# Patient Record
Sex: FEMALE | Race: WHITE | HISPANIC OR LATINO | Employment: UNEMPLOYED | URBAN - METROPOLITAN AREA
[De-identification: names, ages, dates, MRNs, and addresses within clinical notes are randomized per-mention and may not be internally consistent; named-entity substitution may affect disease eponyms.]

---

## 2017-03-29 ENCOUNTER — HOSPITAL ENCOUNTER (EMERGENCY)
Facility: HOSPITAL | Age: 22
Discharge: HOME | End: 2017-03-29
Admitting: EMERGENCY MEDICINE

## 2017-03-29 ENCOUNTER — APPOINTMENT (EMERGENCY)
Dept: RADIOLOGY | Facility: HOSPITAL | Age: 22
End: 2017-03-29

## 2017-03-29 VITALS
OXYGEN SATURATION: 99 % | HEART RATE: 77 BPM | DIASTOLIC BLOOD PRESSURE: 53 MMHG | SYSTOLIC BLOOD PRESSURE: 110 MMHG | RESPIRATION RATE: 16 BRPM | TEMPERATURE: 98.5 F | WEIGHT: 136 LBS

## 2017-03-29 DIAGNOSIS — S50.12XA CONTUSION OF LEFT FOREARM, INITIAL ENCOUNTER: ICD-10-CM

## 2017-03-29 DIAGNOSIS — S80.01XA CONTUSION OF RIGHT KNEE, INITIAL ENCOUNTER: ICD-10-CM

## 2017-03-29 DIAGNOSIS — S39.012A LOW BACK STRAIN, INITIAL ENCOUNTER: ICD-10-CM
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
## 2017-03-29 DIAGNOSIS — S80.02XA CONTUSION OF LEFT KNEE, INITIAL ENCOUNTER: ICD-10-CM

## 2017-03-29 PROCEDURE — 73564 X-RAY EXAM KNEE 4 OR MORE: CPT

## 2017-03-29 PROCEDURE — 99284 EMERGENCY DEPT VISIT MOD MDM: CPT

## 2017-03-29 PROCEDURE — 72100 X-RAY EXAM L-S SPINE 2/3 VWS: CPT

## 2017-03-29 PROCEDURE — 73090 X-RAY EXAM OF FOREARM: CPT

## 2017-03-29 RX ORDER — NAPROXEN 500 MG/1
500 TABLET ORAL 2 TIMES DAILY WITH MEALS
Qty: 30 TABLET | Refills: 0 | Status: SHIPPED | OUTPATIENT
Start: 2017-03-29

## 2017-04-06 ENCOUNTER — ALLSCRIPTS OFFICE VISIT (OUTPATIENT)
Dept: OTHER | Facility: OTHER | Age: 22
End: 2017-04-06

## 2017-04-07 ENCOUNTER — GENERIC CONVERSION - ENCOUNTER (OUTPATIENT)
Dept: OTHER | Facility: OTHER | Age: 22
End: 2017-04-07

## 2018-01-10 NOTE — MISCELLANEOUS
Provider Comments  Provider Comments:   Patient is a no-show for her 56 new patient appointment      Signatures   Electronically signed by : MARISSA Walker;  Apr 6 2017  9:44AM EST                       (Author)

## 2021-06-11 ENCOUNTER — HOSPITAL ENCOUNTER (EMERGENCY)
Facility: HOSPITAL | Age: 26
Discharge: HOME | End: 2021-06-11
Attending: EMERGENCY MEDICINE
Payer: COMMERCIAL

## 2021-06-11 VITALS
DIASTOLIC BLOOD PRESSURE: 73 MMHG | HEART RATE: 94 BPM | WEIGHT: 173.72 LBS | SYSTOLIC BLOOD PRESSURE: 127 MMHG | TEMPERATURE: 98.5 F | OXYGEN SATURATION: 100 % | RESPIRATION RATE: 18 BRPM

## 2021-06-11 DIAGNOSIS — M54.9 BACK PAIN: Primary | ICD-10-CM

## 2021-06-11 LAB
ALBUMIN SERPL BCP-MCNC: 4.4 G/DL (ref 3.5–5)
ALP SERPL-CCNC: 106 U/L (ref 46–116)
ALT SERPL W P-5'-P-CCNC: 46 U/L (ref 12–78)
ANION GAP SERPL CALCULATED.3IONS-SCNC: 9 MMOL/L (ref 4–13)
AST SERPL W P-5'-P-CCNC: 23 U/L (ref 5–45)
ATRIAL RATE: 102 BPM
BACTERIA UR QL AUTO: ABNORMAL /HPF
BASOPHILS # BLD AUTO: 0.03 THOUSANDS/ΜL (ref 0–0.1)
BASOPHILS NFR BLD AUTO: 0 % (ref 0–1)
BILIRUB DIRECT SERPL-MCNC: 0.1 MG/DL (ref 0–0.2)
BILIRUB SERPL-MCNC: 0.3 MG/DL (ref 0.2–1)
BILIRUB UR QL STRIP: NEGATIVE
BUN SERPL-MCNC: 12 MG/DL (ref 5–25)
CALCIUM SERPL-MCNC: 9.3 MG/DL (ref 8.3–10.1)
CHLORIDE SERPL-SCNC: 104 MMOL/L (ref 100–108)
CK SERPL-CCNC: 97 U/L (ref 26–192)
CLARITY UR: CLEAR
CO2 SERPL-SCNC: 26 MMOL/L (ref 21–32)
COLOR UR: YELLOW
CREAT SERPL-MCNC: 0.67 MG/DL (ref 0.6–1.3)
CRP SERPL HS-MCNC: 4.76 MG/L
EOSINOPHIL # BLD AUTO: 0.07 THOUSAND/ΜL (ref 0–0.61)
EOSINOPHIL NFR BLD AUTO: 1 % (ref 0–6)
ERYTHROCYTE [DISTWIDTH] IN BLOOD BY AUTOMATED COUNT: 12.6 % (ref 11.6–15.1)
EXT PREG TEST URINE: NEGATIVE
EXT. CONTROL ED NAV: NORMAL
GFR SERPL CREATININE-BSD FRML MDRD: 122 ML/MIN/1.73SQ M
GLUCOSE SERPL-MCNC: 103 MG/DL (ref 65–140)
GLUCOSE UR STRIP-MCNC: NEGATIVE MG/DL
HCT VFR BLD AUTO: 41.2 % (ref 34.8–46.1)
HGB BLD-MCNC: 13.7 G/DL (ref 11.5–15.4)
HGB UR QL STRIP.AUTO: NEGATIVE
IMM GRANULOCYTES # BLD AUTO: 0.03 THOUSAND/UL (ref 0–0.2)
IMM GRANULOCYTES NFR BLD AUTO: 0 % (ref 0–2)
KETONES UR STRIP-MCNC: NEGATIVE MG/DL
LEUKOCYTE ESTERASE UR QL STRIP: ABNORMAL
LYMPHOCYTES # BLD AUTO: 2.83 THOUSANDS/ΜL (ref 0.6–4.47)
LYMPHOCYTES NFR BLD AUTO: 26 % (ref 14–44)
MAGNESIUM SERPL-MCNC: 2.1 MG/DL (ref 1.6–2.6)
MCH RBC QN AUTO: 31.1 PG (ref 26.8–34.3)
MCHC RBC AUTO-ENTMCNC: 33.3 G/DL (ref 31.4–37.4)
MCV RBC AUTO: 93 FL (ref 82–98)
MONOCYTES # BLD AUTO: 0.51 THOUSAND/ΜL (ref 0.17–1.22)
MONOCYTES NFR BLD AUTO: 5 % (ref 4–12)
NEUTROPHILS # BLD AUTO: 7.49 THOUSANDS/ΜL (ref 1.85–7.62)
NEUTS SEG NFR BLD AUTO: 68 % (ref 43–75)
NITRITE UR QL STRIP: NEGATIVE
NON-SQ EPI CELLS URNS QL MICRO: ABNORMAL /HPF
NRBC BLD AUTO-RTO: 0 /100 WBCS
P AXIS: 63 DEGREES
PH UR STRIP.AUTO: 6 [PH] (ref 4.5–8)
PLATELET # BLD AUTO: 285 THOUSANDS/UL (ref 149–390)
PMV BLD AUTO: 11.1 FL (ref 8.9–12.7)
POTASSIUM SERPL-SCNC: 3.3 MMOL/L (ref 3.5–5.3)
PR INTERVAL: 116 MS
PROT SERPL-MCNC: 8.7 G/DL (ref 6.4–8.2)
PROT UR STRIP-MCNC: NEGATIVE MG/DL
QRS AXIS: 76 DEGREES
QRSD INTERVAL: 82 MS
QT INTERVAL: 352 MS
QTC INTERVAL: 458 MS
RBC # BLD AUTO: 4.41 MILLION/UL (ref 3.81–5.12)
RBC #/AREA URNS AUTO: ABNORMAL /HPF
SODIUM SERPL-SCNC: 139 MMOL/L (ref 136–145)
SP GR UR STRIP.AUTO: 1.02 (ref 1–1.03)
T WAVE AXIS: 49 DEGREES
TROPONIN I SERPL-MCNC: <0.02 NG/ML
UROBILINOGEN UR QL STRIP.AUTO: 1 E.U./DL
VENTRICULAR RATE: 102 BPM
WBC # BLD AUTO: 10.96 THOUSAND/UL (ref 4.31–10.16)
WBC #/AREA URNS AUTO: ABNORMAL /HPF

## 2021-06-11 PROCEDURE — 93010 ELECTROCARDIOGRAM REPORT: CPT | Performed by: INTERNAL MEDICINE

## 2021-06-11 PROCEDURE — 93005 ELECTROCARDIOGRAM TRACING: CPT

## 2021-06-11 PROCEDURE — 80048 BASIC METABOLIC PNL TOTAL CA: CPT | Performed by: EMERGENCY MEDICINE

## 2021-06-11 PROCEDURE — 82550 ASSAY OF CK (CPK): CPT | Performed by: EMERGENCY MEDICINE

## 2021-06-11 PROCEDURE — 83735 ASSAY OF MAGNESIUM: CPT | Performed by: EMERGENCY MEDICINE

## 2021-06-11 PROCEDURE — 80076 HEPATIC FUNCTION PANEL: CPT | Performed by: EMERGENCY MEDICINE

## 2021-06-11 PROCEDURE — 84484 ASSAY OF TROPONIN QUANT: CPT | Performed by: EMERGENCY MEDICINE

## 2021-06-11 PROCEDURE — 85025 COMPLETE CBC W/AUTO DIFF WBC: CPT | Performed by: EMERGENCY MEDICINE

## 2021-06-11 PROCEDURE — 96374 THER/PROPH/DIAG INJ IV PUSH: CPT

## 2021-06-11 PROCEDURE — 81025 URINE PREGNANCY TEST: CPT | Performed by: EMERGENCY MEDICINE

## 2021-06-11 PROCEDURE — 96361 HYDRATE IV INFUSION ADD-ON: CPT

## 2021-06-11 PROCEDURE — 99285 EMERGENCY DEPT VISIT HI MDM: CPT | Performed by: EMERGENCY MEDICINE

## 2021-06-11 PROCEDURE — 81001 URINALYSIS AUTO W/SCOPE: CPT

## 2021-06-11 PROCEDURE — 86141 C-REACTIVE PROTEIN HS: CPT | Performed by: EMERGENCY MEDICINE

## 2021-06-11 PROCEDURE — 36415 COLL VENOUS BLD VENIPUNCTURE: CPT | Performed by: EMERGENCY MEDICINE

## 2021-06-11 PROCEDURE — 99283 EMERGENCY DEPT VISIT LOW MDM: CPT

## 2021-06-11 RX ORDER — NAPROXEN 500 MG/1
500 TABLET ORAL 2 TIMES DAILY WITH MEALS
Qty: 20 TABLET | Refills: 0 | Status: SHIPPED | OUTPATIENT
Start: 2021-06-11 | End: 2021-06-21

## 2021-06-11 RX ORDER — METHOCARBAMOL 500 MG/1
500 TABLET, FILM COATED ORAL 2 TIMES DAILY
Qty: 20 TABLET | Refills: 0 | Status: SHIPPED | OUTPATIENT
Start: 2021-06-11

## 2021-06-11 RX ORDER — KETOROLAC TROMETHAMINE 30 MG/ML
15 INJECTION, SOLUTION INTRAMUSCULAR; INTRAVENOUS ONCE
Status: COMPLETED | OUTPATIENT
Start: 2021-06-11 | End: 2021-06-11

## 2021-06-11 RX ADMIN — KETOROLAC TROMETHAMINE 15 MG: 30 INJECTION, SOLUTION INTRAMUSCULAR; INTRAVENOUS at 20:56

## 2021-06-11 RX ADMIN — SODIUM CHLORIDE 1000 ML: 0.9 INJECTION, SOLUTION INTRAVENOUS at 20:54

## 2021-06-12 NOTE — DISCHARGE INSTRUCTIONS
Take the naprosyn twice daily for the next 10 days  Use the Robaxin as needed for muscle spasm  Use an electric heating pad over your sore muscles, 4-5 times daily, 20 minutes each time  Make sure to drink plenty of fluids  The number for the [facility] has been included in these discharge instructions, you should call to establish continuing medical care if they can make a sooner appointment than your current scheduled appointment with [facility]

## 2021-06-12 NOTE — ED PROVIDER NOTES
History  Chief Complaint   Patient presents with    Back Pain     Pt reports pain in upper and lower back, chest, bilateral flank pain that has been intermittent for the last 2 weeks  Also c/o right sided eye pain  33 YO female presents with back pain  Pt states she has had pain intermittently, usually infrequently, over the course of years, it has become more frequent over the last 2-3 weeks  She states it now will occur daily, does seem to improve with acetaminophen but returns  The pain is in the B/L thoracic back and radiates down to the lower back  She additionally has pain radiating into the chest, primarily the B/L lower chest   She denies fevers, no weakness or numbness  Pt does note some headache today with pain behind the Right eye  She denies any new or different symptoms  She has been seen in the ED for this in the past, states she was given muscle relaxers which did seem to help but she has run out of these  She has an appointment with a family doctor to discuss this but it is not for months  History provided by:  Patient   used: No    Back Pain  Location:  Generalized  Quality:  Aching and shooting  Radiates to: Chest   Pain severity:  Moderate  Pain is:  Unable to specify  Onset quality:  Gradual  Duration: Years  Timing:  Intermittent  Progression:  Worsening  Chronicity:  New  Relieved by:  Muscle relaxants  Worsened by:  Nothing  Ineffective treatments:  None tried  Associated symptoms: no abdominal pain, no chest pain, no dysuria, no fever and no weakness        Prior to Admission Medications   Prescriptions Last Dose Informant Patient Reported? Taking?   naproxen (NAPROSYN) 500 mg tablet   No No   Sig: Take 1 tablet by mouth 2 (two) times a day with meals      Facility-Administered Medications: None       Past Medical History:   Diagnosis Date    No known health problems        History reviewed  No pertinent surgical history  History reviewed   No pertinent family history  I have reviewed and agree with the history as documented  E-Cigarette/Vaping     E-Cigarette/Vaping Substances     Social History     Tobacco Use    Smoking status: Never Smoker    Smokeless tobacco: Never Used   Substance Use Topics    Alcohol use: No    Drug use: No       Review of Systems   Constitutional: Negative for chills, fatigue and fever  HENT: Negative for dental problem  Eyes: Negative for visual disturbance  Respiratory: Negative for shortness of breath  Cardiovascular: Negative for chest pain  Gastrointestinal: Negative for abdominal pain, diarrhea and vomiting  Genitourinary: Negative for dysuria and frequency  Musculoskeletal: Positive for back pain  Negative for arthralgias  Skin: Negative for rash  Neurological: Negative for dizziness, weakness and light-headedness  Psychiatric/Behavioral: Negative for agitation, behavioral problems and confusion  All other systems reviewed and are negative  Physical Exam  Physical Exam  Vitals signs and nursing note reviewed  Constitutional:       Appearance: Normal appearance  HENT:      Head: Normocephalic and atraumatic  Eyes:      Extraocular Movements: Extraocular movements intact  Conjunctiva/sclera: Conjunctivae normal    Neck:      Musculoskeletal: Normal range of motion  Cardiovascular:      Rate and Rhythm: Normal rate  Pulmonary:      Effort: Pulmonary effort is normal    Abdominal:      General: There is no distension  Musculoskeletal: Normal range of motion  Comments: Tender over back, no rash, bruising, swelling  Skin:     Findings: No rash  Neurological:      General: No focal deficit present  Mental Status: She is alert  Cranial Nerves: No cranial nerve deficit     Psychiatric:         Mood and Affect: Mood normal          Vital Signs  ED Triage Vitals   Temperature Pulse Respirations Blood Pressure SpO2   06/11/21 1946 06/11/21 1946 06/11/21 1946 06/11/21 1946 06/11/21 1946   98 5 °F (36 9 °C) 94 18 127/73 100 %      Temp Source Heart Rate Source Patient Position - Orthostatic VS BP Location FiO2 (%)   06/11/21 1946 06/11/21 1946 06/11/21 1946 06/11/21 1946 --   Oral Monitor Sitting Right arm       Pain Score       06/11/21 2056       8           Vitals:    06/11/21 1946   BP: 127/73   Pulse: 94   Patient Position - Orthostatic VS: Sitting         Visual Acuity      ED Medications  Medications   sodium chloride 0 9 % bolus 1,000 mL (0 mL Intravenous Stopped 6/11/21 2201)   ketorolac (TORADOL) injection 15 mg (15 mg Intravenous Given 6/11/21 2056)       Diagnostic Studies  Results Reviewed     Procedure Component Value Units Date/Time    Hepatic function panel [[id number]]  (Abnormal) Collected: 06/11/21 2053    Lab Status: Final result Specimen: Blood from Arm, Left Updated: 06/11/21 2137     Total Bilirubin 0 30 mg/dL      Bilirubin, Direct 0 10 mg/dL      Alkaline Phosphatase 106 U/L      AST 23 U/L      ALT 46 U/L      Total Protein 8 7 g/dL      Albumin 4 4 g/dL     Magnesium [[id number]]  (Normal) Collected: 06/11/21 2053    Lab Status: Final result Specimen: Blood from Arm, Left Updated: 06/11/21 2137     Magnesium 2 1 mg/dL     CK Total with Reflex CKMB [[id number]]  (Normal) Collected: 06/11/21 2053    Lab Status: Final result Specimen: Blood from Arm, Left Updated: 06/11/21 2137     Total CK 97 U/L     High sensitivity CRP [[id number]] Collected: 06/11/21 2053    Lab Status: Final result Specimen: Blood from Arm, Left Updated: 06/11/21 2137     CRP, High Sensitivity 4 76 mg/L     Narrative:            HsCRP Level       Relative Risk           <1 0 mg/L          Low           1 0 to 3 0 mg/L    Average           >3 0 mg/L          High        Troponin I [[id number]]  (Normal) Collected: 06/11/21 2053    Lab Status: Final result Specimen: Blood from Arm, Left Updated: 06/11/21 2126     Troponin I <0 02 ng/mL     Basic metabolic panel [[id number]]  (Abnormal) Collected: 06/11/21 2053    Lab Status: Final result Specimen: Blood from Arm, Left Updated: 06/11/21 2125     Sodium 139 mmol/L      Potassium 3 3 mmol/L      Chloride 104 mmol/L      CO2 26 mmol/L      ANION GAP 9 mmol/L      BUN 12 mg/dL      Creatinine 0 67 mg/dL      Glucose 103 mg/dL      Calcium 9 3 mg/dL      eGFR 122 ml/min/1 73sq m     Narrative:      [place] guidelines for Chronic Kidney Disease (CKD):     Stage 1 with normal or high GFR (GFR > 90 mL/min/1 73 square meters)    Stage 2 Mild CKD (GFR = 60-89 mL/min/1 73 square meters)    Stage 3A Moderate CKD (GFR = 45-59 mL/min/1 73 square meters)    Stage 3B Moderate CKD (GFR = 30-44 mL/min/1 73 square meters)    Stage 4 Severe CKD (GFR = 15-29 mL/min/1 73 square meters)    Stage 5 End Stage CKD (GFR <15 mL/min/1 73 square meters)  Note: GFR calculation is accurate only with a steady state creatinine    Urine Microscopic [[id number]]  (Abnormal) Collected: 06/11/21 2053    Lab Status: Final result Specimen: Urine, Clean Catch Updated: 06/11/21 2123     RBC, UA None Seen /hpf      WBC, UA 2-4 /hpf      Epithelial Cells Moderate /hpf      Bacteria, UA Occasional /hpf     CBC and differential [[id number]]  (Abnormal) Collected: 06/11/21 2053    Lab Status: Final result Specimen: Blood from Arm, Left Updated: 06/11/21 2109     WBC 10 96 Thousand/uL      RBC 4 41 Million/uL      Hemoglobin 13 7 g/dL      Hematocrit 41 2 %      MCV 93 fL      MCH 31 1 pg      MCHC 33 3 g/dL      RDW 12 6 %      MPV 11 1 fL      Platelets 905 Thousands/uL      nRBC 0 /100 WBCs      Neutrophils Relative 68 %      Immat GRANS % 0 %      Lymphocytes Relative 26 %      Monocytes Relative 5 %      Eosinophils Relative 1 %      Basophils Relative 0 %      Neutrophils Absolute 7 49 Thousands/µL      Immature Grans Absolute 0 03 Thousand/uL      Lymphocytes Absolute 2 83 Thousands/µL      Monocytes Absolute 0 51 Thousand/µL      Eosinophils Absolute 0 07 Thousand/µL Basophils Absolute 0 03 Thousands/µL     POCT pregnancy, urine [[id number]]  (Normal) Resulted: 06/11/21 2056    Lab Status: Final result Updated: 06/11/21 2056     EXT PREG TEST UR (Ref: Negative) Negative     Control Valid    Urine Macroscopic, POC [[id number]]  (Abnormal) Collected: 06/11/21 2053    Lab Status: Final result Specimen: Urine Updated: 06/11/21 2054     Color, UA Yellow     Clarity, UA Clear     pH, UA 6 0     Leukocytes, UA Trace     Nitrite, UA Negative     Protein, UA Negative mg/dl      Glucose, UA Negative mg/dl      Ketones, UA Negative mg/dl      Urobilinogen, UA 1 0 E U /dl      Bilirubin, UA Negative     Blood, UA Negative     Specific Gravity, UA 1 020    Narrative:      CLINITEK RESULT                 No orders to display              Procedures  ECG 12 Lead Documentation Only    Date/Time: 6/11/2021 11:12 PM  Performed by: Nolberto Hernandez MD  Authorized by: Nolberto Hernandez MD     ECG reviewed by me, the ED Provider: yes    Patient location:  ED  Interpretation:     Interpretation: normal    Rate:     ECG rate:  102    ECG rate assessment: tachycardic    Rhythm:     Rhythm: sinus rhythm and sinus tachycardia    QRS:     QRS axis:  Normal    QRS intervals:  Normal  Conduction:     Conduction: normal    ST segments:     ST segments:  Normal  T waves:     T waves: normal               ED Course  ED Course as of Jun 11 2319 Fri Jun 11, 2021 2150 Pt states she has significant improvement in her pain  MDM  Number of Diagnoses or Management Options  Back pain: new and requires workup  Diagnosis management comments: 1  Back pain - Pt with ongoing, intermittent back pain, radiating to the chest  She has no known cardiac issues, no "red flag" symptoms  Will check ECG and troponin, urine for infection and pregnancy, CBC, metabolic panel for electrolyte abnormalities and dehydration, will check CK and CRP for possibly myositis  Will give fluids, NSAIDs  Amount and/or Complexity of Data Reviewed  Clinical lab tests: ordered and reviewed  Tests in the radiology section of CPT®: ordered and reviewed  Independent visualization of images, tracings, or specimens: yes    Patient Progress  Patient progress: improved      Disposition  Final diagnoses:   Back pain     Time reflects when diagnosis was documented in both MDM as applicable and the Disposition within this note     Time User Action Codes Description Comment    6/11/2021  9:50 PM Ezio Betty Jernigan [M54 9] Back pain       ED Disposition     ED Disposition Condition Date/Time Comment    Discharge Stable Fri Jun 11, 2021  9:50 PM Nicki Mendenhall discharge to home/self care  Follow-up Information     Follow up With Specialties Details Why [address] [facility] 2nd Floor Family Medicine   [address] [facility]  [phone]            Discharge Medication List as of 6/11/2021  9:52 PM      START taking these medications    Details   methocarbamol (ROBAXIN) 500 mg tablet Take 1 tablet (500 mg total) by mouth 2 (two) times a day, Starting Fri 6/11/2021, Normal      !! naproxen (NAPROSYN) 500 mg tablet Take 1 tablet (500 mg total) by mouth 2 (two) times a day with meals for 10 days, Starting Fri 6/11/2021, Until Mon 6/21/2021, Normal       !! - Potential duplicate medications found  Please discuss with provider  CONTINUE these medications which have NOT CHANGED    Details   !! naproxen (NAPROSYN) 500 mg tablet Take 1 tablet by mouth 2 (two) times a day with meals, Starting 3/29/2017, Until Discontinued, Print       !! - Potential duplicate medications found  Please discuss with provider  No discharge procedures on file      PDMP Review     None          ED Provider  Electronically Signed by           Maria Salcedo MD  06/11/21 2752

## 2023-03-19 ENCOUNTER — APPOINTMENT (EMERGENCY)
Dept: RADIOLOGY | Facility: HOSPITAL | Age: 28
End: 2023-03-19

## 2023-03-19 ENCOUNTER — HOSPITAL ENCOUNTER (EMERGENCY)
Facility: HOSPITAL | Age: 28
Discharge: HOME | End: 2023-03-19
Attending: EMERGENCY MEDICINE | Admitting: EMERGENCY MEDICINE

## 2023-03-19 VITALS
DIASTOLIC BLOOD PRESSURE: 67 MMHG | TEMPERATURE: 98.2 F | HEART RATE: 76 BPM | RESPIRATION RATE: 16 BRPM | OXYGEN SATURATION: 100 % | SYSTOLIC BLOOD PRESSURE: 126 MMHG

## 2023-03-19 DIAGNOSIS — M54.9 BACK PAIN: Primary | ICD-10-CM

## 2023-03-19 DIAGNOSIS — R07.9 CHEST PAIN, UNSPECIFIED: ICD-10-CM

## 2023-03-19 LAB
ALBUMIN SERPL BCP-MCNC: 4.6 G/DL (ref 3.5–5)
ALP SERPL-CCNC: 87 U/L (ref 34–104)
ALT SERPL W P-5'-P-CCNC: 22 U/L (ref 7–52)
ANION GAP SERPL CALCULATED.3IONS-SCNC: 9 MMOL/L (ref 4–13)
AST SERPL W P-5'-P-CCNC: 17 U/L (ref 13–39)
ATRIAL RATE: 79 BPM
BASOPHILS # BLD AUTO: 0.02 THOUSANDS/ÂΜL (ref 0–0.1)
BASOPHILS NFR BLD AUTO: 0 % (ref 0–1)
BILIRUB SERPL-MCNC: 0.44 MG/DL (ref 0.2–1)
BUN SERPL-MCNC: 12 MG/DL (ref 5–25)
CALCIUM SERPL-MCNC: 10 MG/DL (ref 8.4–10.2)
CARDIAC TROPONIN I PNL SERPL HS: <2 NG/L
CHLORIDE SERPL-SCNC: 107 MMOL/L (ref 96–108)
CO2 SERPL-SCNC: 24 MMOL/L (ref 21–32)
CREAT SERPL-MCNC: 0.7 MG/DL (ref 0.6–1.3)
EOSINOPHIL # BLD AUTO: 0.05 THOUSAND/ÂΜL (ref 0–0.61)
EOSINOPHIL NFR BLD AUTO: 1 % (ref 0–6)
ERYTHROCYTE [DISTWIDTH] IN BLOOD BY AUTOMATED COUNT: 13.5 % (ref 11.6–15.1)
GFR SERPL CREATININE-BSD FRML MDRD: 119 ML/MIN/1.73SQ M
GLUCOSE SERPL-MCNC: 111 MG/DL (ref 65–140)
HCT VFR BLD AUTO: 39.3 % (ref 34.8–46.1)
HGB BLD-MCNC: 13.1 G/DL (ref 11.5–15.4)
IMM GRANULOCYTES # BLD AUTO: 0.01 THOUSAND/UL (ref 0–0.2)
IMM GRANULOCYTES NFR BLD AUTO: 0 % (ref 0–2)
LIPASE SERPL-CCNC: 20 U/L (ref 11–82)
LYMPHOCYTES # BLD AUTO: 2.02 THOUSANDS/ÂΜL (ref 0.6–4.47)
LYMPHOCYTES NFR BLD AUTO: 31 % (ref 14–44)
MCH RBC QN AUTO: 30.4 PG (ref 26.8–34.3)
MCHC RBC AUTO-ENTMCNC: 33.3 G/DL (ref 31.4–37.4)
MCV RBC AUTO: 91 FL (ref 82–98)
MONOCYTES # BLD AUTO: 0.26 THOUSAND/ÂΜL (ref 0.17–1.22)
MONOCYTES NFR BLD AUTO: 4 % (ref 4–12)
NEUTROPHILS # BLD AUTO: 4.24 THOUSANDS/ÂΜL (ref 1.85–7.62)
NEUTS SEG NFR BLD AUTO: 64 % (ref 43–75)
NRBC BLD AUTO-RTO: 0 /100 WBCS
P AXIS: 67 DEGREES
PLATELET # BLD AUTO: 223 THOUSANDS/UL (ref 149–390)
PMV BLD AUTO: 11.4 FL (ref 8.9–12.7)
POTASSIUM SERPL-SCNC: 3.6 MMOL/L (ref 3.5–5.3)
PR INTERVAL: 122 MS
PROT SERPL-MCNC: 7.6 G/DL (ref 6.4–8.4)
QRS AXIS: 86 DEGREES
QRSD INTERVAL: 92 MS
QT INTERVAL: 374 MS
QTC INTERVAL: 428 MS
RBC # BLD AUTO: 4.31 MILLION/UL (ref 3.81–5.12)
SODIUM SERPL-SCNC: 140 MMOL/L (ref 135–147)
T WAVE AXIS: 27 DEGREES
VENTRICULAR RATE: 79 BPM
WBC # BLD AUTO: 6.6 THOUSAND/UL (ref 4.31–10.16)

## 2023-03-19 RX ORDER — KETOROLAC TROMETHAMINE 30 MG/ML
15 INJECTION, SOLUTION INTRAMUSCULAR; INTRAVENOUS ONCE
Status: COMPLETED | OUTPATIENT
Start: 2023-03-19 | End: 2023-03-19

## 2023-03-19 RX ORDER — ACETAMINOPHEN 325 MG/1
650 TABLET ORAL ONCE
Status: COMPLETED | OUTPATIENT
Start: 2023-03-19 | End: 2023-03-19

## 2023-03-19 RX ORDER — LIDOCAINE 50 MG/G
2 PATCH TOPICAL ONCE
Status: DISCONTINUED | OUTPATIENT
Start: 2023-03-19 | End: 2023-03-19 | Stop reason: HOSPADM

## 2023-03-19 RX ADMIN — LIDOCAINE 5% 2 PATCH: 700 PATCH TOPICAL at 02:35

## 2023-03-19 RX ADMIN — ACETAMINOPHEN 325MG 650 MG: 325 TABLET ORAL at 02:25

## 2023-03-19 RX ADMIN — KETOROLAC TROMETHAMINE 15 MG: 30 INJECTION, SOLUTION INTRAMUSCULAR; INTRAVENOUS at 02:33

## 2023-03-19 NOTE — ED PROVIDER NOTES
"History  Chief Complaint   Patient presents with   • Chest Pain     Patient reports pain to chest and b/l upper back for 1 month  Denies cough, some SOB  States history of same previously, has been seen in ED and diagnosed with \"muscle spasms\"  Patient is 4 weeks postpartum      Beverly Jc is a 20-year-old female with PMH of recurrent back and chest pain, 4 weeks postpartum, presenting with thoracic back and chest pain x1 month  She states that she has had this pain for over 1 year, it has been worsening for the last month, describes it as a burning sensation, mild relief with Motrin which she has been taking every day  Pain is worse when sitting or lying down, not significantly worsened with movement or walking  Pain not worsened with eating  Denies abdominal pain, fever, N/V, diarrhea  Denies bladder/bowel incontinence, saddle anesthesia, paresthesias, significant weakness  Prior to Admission Medications   Prescriptions Last Dose Informant Patient Reported? Taking? methocarbamol (ROBAXIN) 500 mg tablet   No No   Sig: Take 1 tablet (500 mg total) by mouth 2 (two) times a day   naproxen (NAPROSYN) 500 mg tablet   No No   Sig: Take 1 tablet by mouth 2 (two) times a day with meals      Facility-Administered Medications: None       Past Medical History:   Diagnosis Date   • No known health problems        History reviewed  No pertinent surgical history  History reviewed  No pertinent family history  I have reviewed and agree with the history as documented  E-Cigarette/Vaping     E-Cigarette/Vaping Substances     Social History     Tobacco Use   • Smoking status: Never   • Smokeless tobacco: Never   Substance Use Topics   • Alcohol use: No   • Drug use: No       Review of Systems   Constitutional: Negative for chills and fever  HENT: Negative for ear pain and sore throat  Eyes: Negative for pain and visual disturbance  Respiratory: Negative for cough and shortness of breath      Cardiovascular: " Positive for chest pain  Negative for palpitations  Gastrointestinal: Negative for abdominal pain and vomiting  Genitourinary: Negative for dysuria and hematuria  Musculoskeletal: Positive for back pain  Negative for arthralgias  Skin: Negative for color change and rash  Neurological: Negative for seizures and syncope  All other systems reviewed and are negative  Physical Exam  Physical Exam  Vitals and nursing note reviewed  Constitutional:       General: She is not in acute distress  Appearance: She is well-developed  HENT:      Head: Normocephalic and atraumatic  Eyes:      Conjunctiva/sclera: Conjunctivae normal    Cardiovascular:      Rate and Rhythm: Normal rate and regular rhythm  Heart sounds: No murmur heard  Pulmonary:      Effort: Pulmonary effort is normal  No respiratory distress  Breath sounds: Normal breath sounds  Abdominal:      Palpations: Abdomen is soft  Tenderness: There is no abdominal tenderness  Musculoskeletal:         General: No swelling  Cervical back: Neck supple  Back:       Comments: Tender to palpation of upper back / scapular region  No tenderness to palpation of spine, low back, neck  Skin:     General: Skin is warm and dry  Capillary Refill: Capillary refill takes less than 2 seconds  Neurological:      Mental Status: She is alert     Psychiatric:         Mood and Affect: Mood normal          Vital Signs  ED Triage Vitals [03/19/23 0125]   Temperature Pulse Respirations Blood Pressure SpO2   98 2 °F (36 8 °C) 83 16 126/67 99 %      Temp Source Heart Rate Source Patient Position - Orthostatic VS BP Location FiO2 (%)   Oral Monitor Sitting Right arm --      Pain Score       8           Vitals:    03/19/23 0125 03/19/23 0145 03/19/23 0200 03/19/23 0300   BP: 126/67 119/63 136/69 126/67   Pulse: 83 70 71 76   Patient Position - Orthostatic VS: Sitting Lying Lying Lying         Visual Acuity      ED Medications  Medications   lidocaine (LIDODERM) 5 % patch 2 patch (2 patches Topical Medication Applied 3/19/23 0235)   ketorolac (TORADOL) injection 15 mg (15 mg Intravenous Given 3/19/23 0233)   acetaminophen (TYLENOL) tablet 650 mg (650 mg Oral Given 3/19/23 0225)       Diagnostic Studies  Results Reviewed     Procedure Component Value Units Date/Time    HS Troponin 0hr (reflex protocol) [[id number]]  (Normal) Collected: 03/19/23 0233    Lab Status: Final result Specimen: Blood from Arm, Left Updated: 03/19/23 0305     hs TnI 0hr <2 ng/L     Lipase [[id number]]  (Normal) Collected: 03/19/23 0233    Lab Status: Final result Specimen: Blood from Arm, Left Updated: 03/19/23 0257     Lipase 20 u/L     Comprehensive metabolic panel [[id number]] Collected: 03/19/23 0233    Lab Status: Final result Specimen: Blood from Arm, Left Updated: 03/19/23 0257     Sodium 140 mmol/L      Potassium 3 6 mmol/L      Chloride 107 mmol/L      CO2 24 mmol/L      ANION GAP 9 mmol/L      BUN 12 mg/dL      Creatinine 0 70 mg/dL      Glucose 111 mg/dL      Calcium 10 0 mg/dL      AST 17 U/L      ALT 22 U/L      Alkaline Phosphatase 87 U/L      Total Protein 7 6 g/dL      Albumin 4 6 g/dL      Total Bilirubin 0 44 mg/dL      eGFR 119 ml/min/1 73sq m     Narrative:      [place] guidelines for Chronic Kidney Disease (CKD):   •  Stage 1 with normal or high GFR (GFR > 90 mL/min/1 73 square meters)  •  Stage 2 Mild CKD (GFR = 60-89 mL/min/1 73 square meters)  •  Stage 3A Moderate CKD (GFR = 45-59 mL/min/1 73 square meters)  •  Stage 3B Moderate CKD (GFR = 30-44 mL/min/1 73 square meters)  •  Stage 4 Severe CKD (GFR = 15-29 mL/min/1 73 square meters)  •  Stage 5 End Stage CKD (GFR <15 mL/min/1 73 square meters)  Note: GFR calculation is accurate only with a steady state creatinine    CBC and differential [[id number]] Collected: 03/19/23 0233    Lab Status: Final result Specimen: Blood from Arm, Left Updated: 03/19/23 0247 WBC 6 60 Thousand/uL      RBC 4 31 Million/uL      Hemoglobin 13 1 g/dL      Hematocrit 39 3 %      MCV 91 fL      MCH 30 4 pg      MCHC 33 3 g/dL      RDW 13 5 %      MPV 11 4 fL      Platelets 233 Thousands/uL      nRBC 0 /100 WBCs      Neutrophils Relative 64 %      Immat GRANS % 0 %      Lymphocytes Relative 31 %      Monocytes Relative 4 %      Eosinophils Relative 1 %      Basophils Relative 0 %      Neutrophils Absolute 4 24 Thousands/µL      Immature Grans Absolute 0 01 Thousand/uL      Lymphocytes Absolute 2 02 Thousands/µL      Monocytes Absolute 0 26 Thousand/µL      Eosinophils Absolute 0 05 Thousand/µL      Basophils Absolute 0 02 Thousands/µL                  XR chest 2 views   ED Interpretation by Wayne Otero PA-C (03/19 0259)   ED wet read: no acute cardiopulmonary process seen  Procedures  Procedures         ED Course                               SBIRT 20yo+    Flowsheet Row Most Recent Value   SBIRT (23 yo +)    In order to provide better care to our patients, we are screening all of our patients for alcohol and drug use  Would it be okay to ask you these screening questions? Yes Filed at: 03/19/2023 0150   Initial Alcohol Screen: US AUDIT-C     1  How often do you have a drink containing alcohol? 0 Filed at: 03/19/2023 0150   2  How many drinks containing alcohol do you have on a typical day you are drinking? 0 Filed at: 03/19/2023 0150   3b  FEMALE Any Age, or MALE 65+: How often do you have 4 or more drinks on one occassion? 0 Filed at: 03/19/2023 0150   Audit-C Score 0 Filed at: 03/19/2023 0150   ALISIA: How many times in the past year have you    Used an illegal drug or used a prescription medication for non-medical reasons? Never Filed at: 03/19/2023 0150                    Medical Decision Making  71-year-old female with PMH of recurrent back and chest pain, 4 weeks postpartum, presents with back and chest pain x1 month    Tender to palpation of upper back/scapular regions bilaterally, no spinal tenderness, neurovascularly intact, lungs CTAB, vitals WNL  This is most likely musculoskeletal pain  Patient has been worked up numerous times with no specific cause found, and has had relief with NSAIDs and other medications  Will obtain basic labs, CXR, EKG  Will give toradol, acetaminophen, and lidocaine patch x 2  Workup is benign, patient feeling significant improvement in pain  I recommend that the patient follow up with comprehensive spine program for further management  I explained this to the patient, recommend continue motrin/tylenol as needed for pain, warm compress on affected areas  Patient expresses understanding of the condition, treatment plan, follow-up instructions, and return precautions  Amount and/or Complexity of Data Reviewed  Labs: ordered  Radiology: ordered and independent interpretation performed  Risk  OTC drugs  Prescription drug management  Disposition  Final diagnoses:   Back pain   Chest pain, unspecified     Time reflects when diagnosis was documented in both MDM as applicable and the Disposition within this note     Time User Action Codes Description Comment    3/19/2023  2:41 AM Nick Jernigan [M54 9] Back pain     3/19/2023  2:42 AM Nick Jernigan [R07 9] Chest pain, unspecified       ED Disposition     ED Disposition   Discharge    Condition   Stable    Date/Time   Sun Mar 19, 2023  2:59 AM    Comment   Eileen Pereira discharge to home/self care                 Follow-up Information     Follow up With Specialties Details Why Contact Info Additional Information    [facility] Physical Therapy Schedule an appointment as soon as possible for a visit   [phone]          Discharge Medication List as of 3/19/2023  3:20 AM      CONTINUE these medications which have NOT CHANGED    Details   methocarbamol (ROBAXIN) 500 mg tablet Take 1 tablet (500 mg total) by mouth 2 (two) times a day, Starting Fri 6/11/2021, Normal      naproxen (NAPROSYN) 500 mg tablet Take 1 tablet by mouth 2 (two) times a day with meals, Starting 3/29/2017, Until Discontinued, Print                 PDMP Review     None          ED Provider  Electronically Signed by           Antonette Lora PA-C  03/19/23 0868

## 2023-03-19 NOTE — DISCHARGE INSTRUCTIONS
-Your pain is most likely musculoskeletal in origin   -Please follow up with the comprehensive spine program for further management  They should call you in the next few business days  Please call the number listed in your paperwork if you don't hear from them  -Continue motrin and tylenol for pain relief

## 2023-03-20 ENCOUNTER — NURSE TRIAGE (OUTPATIENT)
Dept: PHYSICAL THERAPY | Facility: OTHER | Age: 28
End: 2023-03-20

## 2023-03-20 NOTE — TELEPHONE ENCOUNTER
Additional Information  • Negative: Is this related to a work injury? • Negative: Is this related to an MVA? • Negative: Are you currently recieving homecare services? Background - Initial Assessment  Clinical complaint: ED visit on 03/19/23 due to thoracic back /chest pain  Pain started about a month ago  Patient stated she has had this pain for over a year now  It radiates to both side of her "Ribs area" and sometimes to her lower back  No numbness or tingling  Seeing PCP only  [facility]  Pain worsen when sitting /laying down  Patient states the pain is constant and described pain as burning and sharp  Date of onset: a month ago (NEW FLARE UP)  Frequency of pain: constant  Quality of pain: burning and sharp      Protocols used: [facility] COMPREHENSIVE SPINE PROGRAM PROTOCOL

## 2023-03-20 NOTE — TELEPHONE ENCOUNTER
This nurse called the patient to complete the triage process  Call unanswered  Message left with reason for call, [facility] contact info, and hours of operation  Encouraged patient to CB if she wishes to proceed  Referral closed per protocol

## 2023-07-11 ENCOUNTER — HOSPITAL ENCOUNTER (EMERGENCY)
Facility: HOSPITAL | Age: 28
Discharge: HOME | End: 2023-07-11
Attending: EMERGENCY MEDICINE | Admitting: EMERGENCY MEDICINE
Payer: MEDICARE

## 2023-07-11 VITALS
SYSTOLIC BLOOD PRESSURE: 142 MMHG | RESPIRATION RATE: 18 BRPM | WEIGHT: 179.45 LBS | DIASTOLIC BLOOD PRESSURE: 83 MMHG | OXYGEN SATURATION: 100 % | TEMPERATURE: 97.9 F | HEART RATE: 98 BPM

## 2023-07-11 DIAGNOSIS — M54.9 MUSCULOSKELETAL BACK PAIN: Primary | ICD-10-CM

## 2023-07-11 RX ORDER — LIDOCAINE 50 MG/G
1 PATCH TOPICAL DAILY
Qty: 7 PATCH | Refills: 0 | Status: SHIPPED | OUTPATIENT
Start: 2023-07-11

## 2023-07-11 RX ORDER — METHOCARBAMOL 500 MG/1
500 TABLET, FILM COATED ORAL 2 TIMES DAILY
Qty: 14 TABLET | Refills: 0 | Status: SHIPPED | OUTPATIENT
Start: 2023-07-11 | End: 2023-07-18

## 2023-07-11 RX ORDER — KETOROLAC TROMETHAMINE 30 MG/ML
15 INJECTION, SOLUTION INTRAMUSCULAR; INTRAVENOUS ONCE
Status: COMPLETED | OUTPATIENT
Start: 2023-07-11 | End: 2023-07-11

## 2023-07-11 RX ORDER — ETONOGESTREL 68 MG/1
1 IMPLANT SUBCUTANEOUS
COMMUNITY

## 2023-07-11 RX ORDER — LIDOCAINE 50 MG/G
1 PATCH TOPICAL ONCE
Status: DISCONTINUED | OUTPATIENT
Start: 2023-07-11 | End: 2023-07-12 | Stop reason: HOSPADM

## 2023-07-11 RX ORDER — NAPROXEN 500 MG/1
500 TABLET ORAL 2 TIMES DAILY WITH MEALS
Qty: 14 TABLET | Refills: 0 | Status: SHIPPED | OUTPATIENT
Start: 2023-07-11 | End: 2023-07-18

## 2023-07-11 RX ORDER — ACETAMINOPHEN 325 MG/1
975 TABLET ORAL ONCE
Status: COMPLETED | OUTPATIENT
Start: 2023-07-11 | End: 2023-07-11

## 2023-07-11 RX ADMIN — LIDOCAINE 1 PATCH: 50 PATCH TOPICAL at 22:03

## 2023-07-11 RX ADMIN — KETOROLAC TROMETHAMINE 15 MG: 30 INJECTION, SOLUTION INTRAMUSCULAR; INTRAVENOUS at 22:05

## 2023-07-11 RX ADMIN — ACETAMINOPHEN 325MG 975 MG: 325 TABLET ORAL at 22:00

## 2023-07-11 NOTE — Clinical Note
Sarahi Ramirez was seen and treated in our emergency department on 7/11/2023. Diagnosis:     NAYANA  may return to work on return date. She may return on this date: 07/13/2023    Chevis Locket was seen in our ED today. Please excuse Chevis Locket for any work/school missed and allow Chevis Locket to return on the date listed above. If you have any questions or concerns, please don't hesitate to call.       Iraida Martinez, DO    ______________________________           _______________          _______________  Hospital Representative                              Date                                Time

## 2023-07-12 NOTE — ED PROVIDER NOTES
History  Chief Complaint   Patient presents with   • Back Pain     Low back pain for > one year, able to ambulate     Patient is a 80-year-old female with no significant past medical history, presenting for evaluation of back pain. She states she has had back pain for over a year now and she seems to be having a exacerbation of her chronic pain. She states that this is predominantly in her mid back. It feels exactly the same as past exacerbations. She denies any new trauma to the area. She denies any urinary incontinence, or retention. She denies any changes in sensation or saddle anesthesia. She has not had any fevers. She denies IV drug use, history of cancer, history of surgery on her back. She has been taking Tylenol as well as naproxen for her symptoms with some relief. Prior to Admission Medications   Prescriptions Last Dose Informant Patient Reported? Taking?   etonogestrel (Nexplanon) subdermal implant   Yes Yes   Sig: Inject 1 each under the skin   methocarbamol (ROBAXIN) 500 mg tablet Not Taking  No No   Sig: Take 1 tablet (500 mg total) by mouth 2 (two) times a day   Patient not taking: Reported on 7/11/2023   naproxen (NAPROSYN) 500 mg tablet   No Yes   Sig: Take 1 tablet by mouth 2 (two) times a day with meals      Facility-Administered Medications: None       Past Medical History:   Diagnosis Date   • No known health problems        History reviewed. No pertinent surgical history. History reviewed. No pertinent family history. I have reviewed and agree with the history as documented. E-Cigarette/Vaping     E-Cigarette/Vaping Substances     Social History     Tobacco Use   • Smoking status: Never   • Smokeless tobacco: Never   Substance Use Topics   • Alcohol use: No   • Drug use: No        Review of Systems   Constitutional: Negative for fever. Respiratory: Negative for shortness of breath. Cardiovascular: Negative for chest pain.    Gastrointestinal: Negative for abdominal pain, diarrhea, nausea and vomiting. Musculoskeletal: Positive for back pain. Negative for gait problem and neck pain. Neurological: Negative for weakness and numbness. All other systems reviewed and are negative. Physical Exam  ED Triage Vitals   Temperature Pulse Respirations Blood Pressure SpO2   07/11/23 2137 07/11/23 2137 07/11/23 2137 07/11/23 2137 07/11/23 2138   97.9 °F (36.6 °C) 98 18 142/83 100 %      Temp src Heart Rate Source Patient Position - Orthostatic VS BP Location FiO2 (%)   -- 07/11/23 2137 -- -- --    Monitor         Pain Score       07/11/23 2200       8             Orthostatic Vital Signs  Vitals:    07/11/23 2137   BP: 142/83   Pulse: 98       Physical Exam  Vitals and nursing note reviewed. Constitutional:       General: She is not in acute distress. Appearance: Normal appearance. She is not ill-appearing or toxic-appearing. HENT:      Head: Normocephalic and atraumatic. Right Ear: External ear normal.      Left Ear: External ear normal.      Nose: Nose normal.   Eyes:      General: No scleral icterus. Right eye: No discharge. Left eye: No discharge. Extraocular Movements: Extraocular movements intact. Conjunctiva/sclera: Conjunctivae normal.   Cardiovascular:      Rate and Rhythm: Normal rate. Heart sounds: Normal heart sounds. No murmur heard. No friction rub. No gallop. Pulmonary:      Effort: Pulmonary effort is normal. No respiratory distress. Breath sounds: Normal breath sounds. Abdominal:      General: Abdomen is flat. There is no distension. Palpations: Abdomen is soft. There is no mass. Tenderness: There is no abdominal tenderness. Genitourinary:     Comments: Deferred  Musculoskeletal:      Comments: Minimal tenderness of the parathoracic muscles, no bony vertebral tenderness. No stepoffs, deformities or skin changes. Full ROM. Strength of bilateral lower extremities 5/5 in ankle flexion and extension. EHL intact. Sensation intact including S1 distribution. DP/PT pulses 2+/4. Skin:     General: Skin is warm and dry. Neurological:      General: No focal deficit present. Mental Status: She is alert. ED Medications  Medications   ketorolac (TORADOL) injection 15 mg (15 mg Intramuscular Given 7/11/23 2205)   acetaminophen (TYLENOL) tablet 975 mg (975 mg Oral Given 7/11/23 2200)       Diagnostic Studies  Results Reviewed     None                 No orders to display         Procedures  Procedures      ED Course                             SBIRT 22yo+    Flowsheet Row Most Recent Value   Initial Alcohol Screen: US AUDIT-C     1. How often do you have a drink containing alcohol? 0 Filed at: 07/11/2023 2212   2. How many drinks containing alcohol do you have on a typical day you are drinking? 0 Filed at: 07/11/2023 2212   3a. Male UNDER 65: How often do you have five or more drinks on one occasion? 0 Filed at: 07/11/2023 2212   3b. FEMALE Any Age, or MALE 65+: How often do you have 4 or more drinks on one occassion? 0 Filed at: 07/11/2023 2212   Audit-C Score 0 Filed at: 07/11/2023 2212   ALISIA: How many times in the past year have you. .. Used an illegal drug or used a prescription medication for non-medical reasons? Never Filed at: 07/11/2023 2212                Medical Decision Making  Patient is a 27-year-old female presenting for evaluation of back pain. Back pain described as an acute exacerbation of her similar chronic pain. No red flags on evaluation. Presentation most consistent with musculoskeletal strain/spasm. Plan: Pain control, reassessment    On reassessment, patient's symptoms largely controlled. Presentation most consistent with nonemergent back pain. Ambulatory referral for comprehensive spine was written. Prescriptions for naproxen, Lidoderm, and Robaxin written and sent to patient pharmacy. Patient seems to understand this plan and is agreeable. All questions answered. Patient discharged home with return precautions. I independently reviewed this patient's chart. I considered her chronic medical conditions including her history of similar back pain in my medical decision making. Risk  OTC drugs. Prescription drug management. Disposition  Final diagnoses:   Musculoskeletal back pain     Time reflects when diagnosis was documented in both MDM as applicable and the Disposition within this note     Time User Action Codes Description Comment    7/11/2023 10:38 PM Blaine Jernigan [M54.9] Musculoskeletal back pain       ED Disposition     ED Disposition   Discharge    Condition   Stable    Date/Time   Tue Jul 11, 2023 10:49 PM    [address] discharge to home/self care. Follow-up Information     Follow up With Specialties Details Why [address] [place] [address] [facility] Go in 1 week  [address] [facility]  [phone]            Discharge Medication List as of 7/11/2023 10:49 PM      START taking these medications    Details   lidocaine (Lidoderm) 5 % Apply 1 patch topically over 12 hours daily Remove & Discard patch within 12 hours or as directed by MD, Starting Tue 7/11/2023, Normal      !! methocarbamol (ROBAXIN) 500 mg tablet Take 1 tablet (500 mg total) by mouth 2 (two) times a day for 7 days, Starting Tue 7/11/2023, Until Tue 7/18/2023, Normal      !! naproxen (Naprosyn) 500 mg tablet Take 1 tablet (500 mg total) by mouth 2 (two) times a day with meals for 7 days, Starting Tue 7/11/2023, Until Tue 7/18/2023, Normal       !! - Potential duplicate medications found. Please discuss with provider.       CONTINUE these medications which have NOT CHANGED    Details   etonogestrel (Nexplanon) subdermal implant Inject 1 each under the skin, Historical Med      !! naproxen (NAPROSYN) 500 mg tablet Take 1 tablet by mouth 2 (two) times a day with meals, Starting 3/29/2017, Until Discontinued, Print !! methocarbamol (ROBAXIN) 500 mg tablet Take 1 tablet (500 mg total) by mouth 2 (two) times a day, Starting Fri 6/11/2021, Normal       !! - Potential duplicate medications found. Please discuss with provider. PDMP Review     None           ED Provider  Attending physically available and evaluated Matt Bright. I managed the patient along with the ED Attending.     Electronically Signed by         Iraida Martinez DO  07/12/23 2250

## 2023-07-12 NOTE — ED ATTENDING ATTESTATION
7/11/2023  I, [address], DO, saw and evaluated the patient. I have discussed the patient with the resident/non-physician practitioner and agree with the resident's/non-physician practitioner's findings, Plan of Care, and MDM as documented in the resident's/non-physician practitioner's note, except where noted. All available labs and Radiology studies were reviewed. I was present for key portions of any procedure(s) performed by the resident/non-physician practitioner and I was immediately available to provide assistance. At this point I agree with the current assessment done in the Emergency Department. I have conducted an independent evaluation of this patient a history and physical is as follows:    Patient is a 26-year-old female coming in today with persistent and worsening thoracic spine pain that is been going on for over a year. Patient had no repeat falls or injuries. She has not followed up with her PCP or specialist.  She has no palpitations, syncope, chest pain. She has not taken anything for this. She is not on any long trips or recent surgeries. She has not had any history of DVT or PE. On my examination of patient:   /83   Pulse 98   Temp 97.9 °F (36.6 °C)   Resp 18   Wt 81.4 kg (179 lb 7.3 oz)   SpO2 100%   The patient is awake, alert and oriented. Well-nourished, well-developed. Appears stated age. Speaking normally in full sentences. No conversational dyspnea  Head: Normocephalic, atraumatic, nontender. External examination of the ears is unremarkable  Pupils are equal round and reactive to light, there is no conjunctival injection or scleral icterus noted  Nares are patent without rhinorrhea. The oropharynx is moist without injection. No malocclusion. No stridor. Normal phonation. No drooling. Normal swallowing. No brawniness under the tongue  Neck: Symmetric, trachea midline. No JVD. Supple  Lungs: Unlabored,  No retractions,. No tachypnea. Abdomen: Soft and nontender. There is no rebound or guarding  Musculoskeletal: Normal range of motion with grossly normal strength  Neuro: Cranial nerves II through XII grossly intact. Nonfocal exam  Skin: No rash noted, well perfused to the exposed areas  Psych: Mood and affect are appropriate    On my exam patient is resting in bed laying on her right side. She was able to twist her thoracic spine without any difficulty when turning towards me. No respiratory distress. Diagnosis:  Acute on chronic thoracic spine pain      Disclosure: Voice to text software was used in the preparation of this document and could have resulted in translational errors. Occasional wrong word or "sound a like" substitutions may have occurred due to the inherent limitations of voice recognition software. Read the chart carefully and recognize, using context, where substitutions have occurred. I have independently reviewed external records are available to me to the level of detail possible within the time constraints of my patient care responsibilities in the ED.

## 2023-07-12 NOTE — DISCHARGE INSTRUCTIONS
You were evaluated in the Emergency Department today for your back pain. Your evaluation did not show signs of medical conditions requiring emergent intervention at this time, and we feel safe discharging you home. I gave you a prescription for naproxen, lidoderm patches, and robaxin. These are at your pharmacy. Please take as prescribed. You can also take tylenol for pain. This medication is over the counter. Please schedule an appointment for follow-up with your primary care physician this week for further evaluation of your symptoms. I also wrote a referral for the comprehensive spine program. Please follow up with them. Return to the Emergency Department if you experience worsening back pain, difficulty walking, fevers, numbness, tingling, difficulty urinating, incontinence, or any other concerning symptoms. Thank you for choosing us for your care.

## 2023-07-13 ENCOUNTER — NURSE TRIAGE (OUTPATIENT)
Dept: PHYSICAL THERAPY | Facility: OTHER | Age: 28
End: 2023-07-13

## 2023-07-13 NOTE — TELEPHONE ENCOUNTER
Additional Information  • Negative: Is this related to an MVA? • Negative: Is this related to a work injury? • Negative: Are you currently recieving homecare services? Background - Initial Assessment  Clinical complaint: Bilateral low back pain- Denies radiation  Date of onset: Hx of intermittent  Frequency of pain: n/a  Quality of pain: n/a    Protocols used: [facility] PROTOCOL    Nurse reached out to discuss recent referral entered for  Comprehensive Spine program.  This RN reviewed the triage, referral entry and evaluation with an Advanced Spine therapist with her. Patient agreed to triage and it was initiated. Nurse overheard a male voice in the background while asking the patient the Date of on-set question. The patient asked if the nurse could call back "another time or later". Nurse stated it would be best if she called  Comprehensive spine when she is available/able to complete the triage and reminded it should not take long. Patient agreed and thanked nurse. Contact information and hours of operation provided to the patient. Encouraged to CB to proceed when she can. Nurse wished her well and referral closed per protocol.

## 2023-11-06 ENCOUNTER — APPOINTMENT (EMERGENCY)
Dept: RADIOLOGY | Facility: HOSPITAL | Age: 28
End: 2023-11-06
Payer: MEDICARE

## 2023-11-06 ENCOUNTER — APPOINTMENT (EMERGENCY)
Dept: CT IMAGING | Facility: HOSPITAL | Age: 28
End: 2023-11-06
Payer: MEDICARE

## 2023-11-06 ENCOUNTER — HOSPITAL ENCOUNTER (EMERGENCY)
Facility: HOSPITAL | Age: 28
Discharge: HOME | End: 2023-11-06
Attending: EMERGENCY MEDICINE | Admitting: EMERGENCY MEDICINE
Payer: MEDICARE

## 2023-11-06 VITALS
HEART RATE: 101 BPM | RESPIRATION RATE: 18 BRPM | SYSTOLIC BLOOD PRESSURE: 123 MMHG | TEMPERATURE: 98.3 F | DIASTOLIC BLOOD PRESSURE: 70 MMHG | WEIGHT: 190.26 LBS | OXYGEN SATURATION: 99 %

## 2023-11-06 DIAGNOSIS — E87.6 HYPOKALEMIA: ICD-10-CM

## 2023-11-06 DIAGNOSIS — R07.9 CHEST PAIN: ICD-10-CM

## 2023-11-06 DIAGNOSIS — N39.0 UTI (URINARY TRACT INFECTION): Primary | ICD-10-CM

## 2023-11-06 DIAGNOSIS — R10.9 ABDOMINAL PAIN: ICD-10-CM

## 2023-11-06 LAB
ALBUMIN SERPL BCP-MCNC: 4.8 G/DL (ref 3.5–5)
ALP SERPL-CCNC: 80 U/L (ref 34–104)
ALT SERPL W P-5'-P-CCNC: 40 U/L (ref 7–52)
AMORPH URATE CRY URNS QL MICRO: ABNORMAL /HPF
ANION GAP SERPL CALCULATED.3IONS-SCNC: 8 MMOL/L
AST SERPL W P-5'-P-CCNC: 45 U/L (ref 13–39)
ATRIAL RATE: 92 BPM
BACTERIA UR QL AUTO: ABNORMAL /HPF
BASOPHILS # BLD AUTO: 0.04 THOUSANDS/ÂΜL (ref 0–0.1)
BASOPHILS NFR BLD AUTO: 0 % (ref 0–1)
BILIRUB SERPL-MCNC: 0.53 MG/DL (ref 0.2–1)
BILIRUB UR QL STRIP: NEGATIVE
BUN SERPL-MCNC: 16 MG/DL (ref 5–25)
CALCIUM SERPL-MCNC: 9.5 MG/DL (ref 8.4–10.2)
CARDIAC TROPONIN I PNL SERPL HS: <2 NG/L
CHLORIDE SERPL-SCNC: 103 MMOL/L (ref 96–108)
CLARITY UR: ABNORMAL
CO2 SERPL-SCNC: 27 MMOL/L (ref 21–32)
COLOR UR: ABNORMAL
CREAT SERPL-MCNC: 0.52 MG/DL (ref 0.6–1.3)
EOSINOPHIL # BLD AUTO: 0.06 THOUSAND/ÂΜL (ref 0–0.61)
EOSINOPHIL NFR BLD AUTO: 0 % (ref 0–6)
ERYTHROCYTE [DISTWIDTH] IN BLOOD BY AUTOMATED COUNT: 12.4 % (ref 11.6–15.1)
EXT PREGNANCY TEST URINE: NEGATIVE
EXT. CONTROL: NORMAL
GFR SERPL CREATININE-BSD FRML MDRD: 130 ML/MIN/1.73SQ M
GLUCOSE SERPL-MCNC: 118 MG/DL (ref 65–140)
GLUCOSE UR STRIP-MCNC: NEGATIVE MG/DL
HCT VFR BLD AUTO: 37.4 % (ref 34.8–46.1)
HGB BLD-MCNC: 12.8 G/DL (ref 11.5–15.4)
HGB UR QL STRIP.AUTO: 150
IMM GRANULOCYTES # BLD AUTO: 0.04 THOUSAND/UL (ref 0–0.2)
IMM GRANULOCYTES NFR BLD AUTO: 0 % (ref 0–2)
KETONES UR STRIP-MCNC: NEGATIVE MG/DL
LEUKOCYTE ESTERASE UR QL STRIP: NEGATIVE
LIPASE SERPL-CCNC: 25 U/L (ref 11–82)
LYMPHOCYTES # BLD AUTO: 4.26 THOUSANDS/ÂΜL (ref 0.6–4.47)
LYMPHOCYTES NFR BLD AUTO: 31 % (ref 14–44)
MCH RBC QN AUTO: 31.8 PG (ref 26.8–34.3)
MCHC RBC AUTO-ENTMCNC: 34.2 G/DL (ref 31.4–37.4)
MCV RBC AUTO: 93 FL (ref 82–98)
MONOCYTES # BLD AUTO: 0.52 THOUSAND/ÂΜL (ref 0.17–1.22)
MONOCYTES NFR BLD AUTO: 4 % (ref 4–12)
NEUTROPHILS # BLD AUTO: 9.01 THOUSANDS/ÂΜL (ref 1.85–7.62)
NEUTS SEG NFR BLD AUTO: 65 % (ref 43–75)
NITRITE UR QL STRIP: NEGATIVE
NON-SQ EPI CELLS URNS QL MICRO: ABNORMAL /HPF
NRBC BLD AUTO-RTO: 0 /100 WBCS
P AXIS: 63 DEGREES
PH UR STRIP.AUTO: 7 [PH]
PLATELET # BLD AUTO: 266 THOUSANDS/UL (ref 149–390)
PMV BLD AUTO: 11.1 FL (ref 8.9–12.7)
POTASSIUM SERPL-SCNC: 3.1 MMOL/L (ref 3.5–5.3)
PR INTERVAL: 132 MS
PROT SERPL-MCNC: 7.5 G/DL (ref 6.4–8.4)
PROT UR STRIP-MCNC: NEGATIVE MG/DL
QRS AXIS: 82 DEGREES
QRSD INTERVAL: 92 MS
QT INTERVAL: 352 MS
QTC INTERVAL: 435 MS
RBC # BLD AUTO: 4.02 MILLION/UL (ref 3.81–5.12)
RBC #/AREA URNS AUTO: ABNORMAL /HPF
SODIUM SERPL-SCNC: 138 MMOL/L (ref 135–147)
SP GR UR STRIP.AUTO: 1.01 (ref 1–1.04)
T WAVE AXIS: 47 DEGREES
UROBILINOGEN UA: 1 MG/DL
VENTRICULAR RATE: 92 BPM
WBC # BLD AUTO: 13.93 THOUSAND/UL (ref 4.31–10.16)
WBC #/AREA URNS AUTO: ABNORMAL /HPF

## 2023-11-06 PROCEDURE — 87591 N.GONORRHOEAE DNA AMP PROB: CPT | Performed by: PHYSICIAN ASSISTANT

## 2023-11-06 PROCEDURE — 71045 X-RAY EXAM CHEST 1 VIEW: CPT

## 2023-11-06 PROCEDURE — 83690 ASSAY OF LIPASE: CPT | Performed by: PHYSICIAN ASSISTANT

## 2023-11-06 PROCEDURE — 93010 ELECTROCARDIOGRAM REPORT: CPT | Performed by: STUDENT IN AN ORGANIZED HEALTH CARE EDUCATION/TRAINING PROGRAM

## 2023-11-06 PROCEDURE — 96374 THER/PROPH/DIAG INJ IV PUSH: CPT

## 2023-11-06 PROCEDURE — 93005 ELECTROCARDIOGRAM TRACING: CPT

## 2023-11-06 PROCEDURE — 99285 EMERGENCY DEPT VISIT HI MDM: CPT | Performed by: PHYSICIAN ASSISTANT

## 2023-11-06 PROCEDURE — 81001 URINALYSIS AUTO W/SCOPE: CPT | Performed by: PHYSICIAN ASSISTANT

## 2023-11-06 PROCEDURE — 81025 URINE PREGNANCY TEST: CPT | Performed by: PHYSICIAN ASSISTANT

## 2023-11-06 PROCEDURE — 99284 EMERGENCY DEPT VISIT MOD MDM: CPT

## 2023-11-06 PROCEDURE — 87491 CHLMYD TRACH DNA AMP PROBE: CPT | Performed by: PHYSICIAN ASSISTANT

## 2023-11-06 PROCEDURE — 84484 ASSAY OF TROPONIN QUANT: CPT | Performed by: PHYSICIAN ASSISTANT

## 2023-11-06 PROCEDURE — 96361 HYDRATE IV INFUSION ADD-ON: CPT

## 2023-11-06 PROCEDURE — G1004 CDSM NDSC: HCPCS

## 2023-11-06 PROCEDURE — 74177 CT ABD & PELVIS W/CONTRAST: CPT

## 2023-11-06 PROCEDURE — 36415 COLL VENOUS BLD VENIPUNCTURE: CPT | Performed by: PHYSICIAN ASSISTANT

## 2023-11-06 PROCEDURE — 81003 URINALYSIS AUTO W/O SCOPE: CPT | Performed by: PHYSICIAN ASSISTANT

## 2023-11-06 PROCEDURE — 80053 COMPREHEN METABOLIC PANEL: CPT | Performed by: PHYSICIAN ASSISTANT

## 2023-11-06 PROCEDURE — 85025 COMPLETE CBC W/AUTO DIFF WBC: CPT | Performed by: PHYSICIAN ASSISTANT

## 2023-11-06 RX ORDER — CEPHALEXIN 500 MG/1
500 CAPSULE ORAL ONCE
Status: COMPLETED | OUTPATIENT
Start: 2023-11-06 | End: 2023-11-06

## 2023-11-06 RX ORDER — OXYCODONE HYDROCHLORIDE AND ACETAMINOPHEN 5; 325 MG/1; MG/1
1 TABLET ORAL ONCE
Status: COMPLETED | OUTPATIENT
Start: 2023-11-06 | End: 2023-11-06

## 2023-11-06 RX ORDER — NAPROXEN 500 MG/1
500 TABLET ORAL 2 TIMES DAILY WITH MEALS
Qty: 14 TABLET | Refills: 0 | Status: SHIPPED | OUTPATIENT
Start: 2023-11-06 | End: 2023-11-13

## 2023-11-06 RX ORDER — KETOROLAC TROMETHAMINE 30 MG/ML
15 INJECTION, SOLUTION INTRAMUSCULAR; INTRAVENOUS ONCE
Status: COMPLETED | OUTPATIENT
Start: 2023-11-06 | End: 2023-11-06

## 2023-11-06 RX ORDER — CEPHALEXIN 500 MG/1
500 CAPSULE ORAL 3 TIMES DAILY
Qty: 21 CAPSULE | Refills: 0 | Status: SHIPPED | OUTPATIENT
Start: 2023-11-06 | End: 2023-11-13

## 2023-11-06 RX ORDER — POTASSIUM CHLORIDE 750 MG/1
40 TABLET, EXTENDED RELEASE ORAL ONCE
Status: COMPLETED | OUTPATIENT
Start: 2023-11-06 | End: 2023-11-06

## 2023-11-06 RX ADMIN — OXYCODONE HYDROCHLORIDE AND ACETAMINOPHEN 1 TABLET: 5; 325 TABLET ORAL at 23:40

## 2023-11-06 RX ADMIN — CEPHALEXIN 500 MG: 500 CAPSULE ORAL at 23:34

## 2023-11-06 RX ADMIN — KETOROLAC TROMETHAMINE 15 MG: 30 INJECTION, SOLUTION INTRAMUSCULAR; INTRAVENOUS at 22:09

## 2023-11-06 RX ADMIN — IOHEXOL 100 ML: 350 INJECTION, SOLUTION INTRAVENOUS at 22:49

## 2023-11-06 RX ADMIN — POTASSIUM CHLORIDE 40 MEQ: 750 TABLET, EXTENDED RELEASE ORAL at 23:34

## 2023-11-06 RX ADMIN — SODIUM CHLORIDE 1000 ML: 0.9 INJECTION, SOLUTION INTRAVENOUS at 22:09

## 2023-11-06 NOTE — Clinical Note
Nathan Hale was seen and treated in our emergency department on 11/6/2023. No restrictions            Diagnosis:     Molly Cooper  may return to work on return date. She may return on this date: 11/08/2023         If you have any questions or concerns, please don't hesitate to call.       Lori Meyer PA-C    ______________________________           _______________          _______________  Hospital Representative                              Date                                Time

## 2023-11-07 NOTE — ED PROVIDER NOTES
History  Chief Complaint   Patient presents with    Abdominal Pain     Arrives reporting severe abd pain that started just PTA. States pain now wraps around bilateral flanks. No meds PTA. On menses now. 26-year-old female presents emergency room for evaluation of abdominal pain. Onset just prior to arrival when she was laying down looking at her phone. States she developed a sudden burning sensation along the upper and left side side. Radiating to her back. States both sides of the back and abdomen also hurt. Denies injury. Pain is not related to eating. Denies alcohol or drug use. Denies n/v/d. Denies urinary sx's. Denies pregnancy. States she is currently on her menses and it is normal. Denies PMH or surgical history. She does admit to one prior episode of abdominal pain similar to this but not nearly as bad. She was seen at that time but nothing was found on CT. History provided by:  Patient  Abdominal Pain  Associated symptoms: no chest pain, no chills, no diarrhea, no dysuria, no fever, no shortness of breath and no vomiting        Prior to Admission Medications   Prescriptions Last Dose Informant Patient Reported? Taking?   etonogestrel (Nexplanon) subdermal implant   Yes No   Sig: Inject 1 each under the skin   lidocaine (Lidoderm) 5 %   No No   Sig: Apply 1 patch topically over 12 hours daily Remove & Discard patch within 12 hours or as directed by MD   methocarbamol (ROBAXIN) 500 mg tablet   No No   Sig: Take 1 tablet (500 mg total) by mouth 2 (two) times a day   Patient not taking: Reported on 7/11/2023   methocarbamol (ROBAXIN) 500 mg tablet   No No   Sig: Take 1 tablet (500 mg total) by mouth 2 (two) times a day for 7 days      Facility-Administered Medications: None       Past Medical History:   Diagnosis Date    No known health problems        History reviewed. No pertinent surgical history. History reviewed. No pertinent family history.   I have reviewed and agree with the history as documented. E-Cigarette/Vaping     E-Cigarette/Vaping Substances     Social History     Tobacco Use    Smoking status: Never    Smokeless tobacco: Never   Substance Use Topics    Alcohol use: No    Drug use: No       Review of Systems   Constitutional:  Negative for chills and fever. HENT:  Negative for congestion. Respiratory:  Negative for shortness of breath. Cardiovascular:  Negative for chest pain. Gastrointestinal:  Positive for abdominal pain. Negative for diarrhea and vomiting. Genitourinary:  Positive for flank pain. Negative for dysuria. Musculoskeletal:  Positive for back pain and myalgias. Negative for neck pain. Skin:  Negative for rash. Neurological:  Negative for syncope. All other systems reviewed and are negative. Physical Exam  Physical Exam  Vitals and nursing note reviewed. Constitutional:       General: She is in acute distress (mild, secondary to pain). Appearance: Normal appearance. She is well-developed. HENT:      Head: Atraumatic. Right Ear: External ear normal.      Left Ear: External ear normal.   Eyes:      General: No scleral icterus. Conjunctiva/sclera: Conjunctivae normal.   Cardiovascular:      Rate and Rhythm: Normal rate and regular rhythm. Heart sounds: Normal heart sounds. Pulmonary:      Effort: Pulmonary effort is normal.      Breath sounds: Normal breath sounds. Abdominal:      General: Bowel sounds are normal. There is no distension. Palpations: Abdomen is soft. Tenderness: There is abdominal tenderness in the epigastric area, left upper quadrant and left lower quadrant. There is left CVA tenderness. There is no right CVA tenderness or guarding. Hernia: There is no hernia in the umbilical area. Musculoskeletal:         General: Normal range of motion. Cervical back: Neck supple. Skin:     General: Skin is warm and dry. Findings: No rash.    Neurological:      Mental Status: She is alert and oriented to person, place, and time. Psychiatric:         Mood and Affect: Mood normal.         Vital Signs  ED Triage Vitals   Temperature Pulse Respirations Blood Pressure SpO2   11/06/23 2131 11/06/23 2131 11/06/23 2131 11/06/23 2131 11/06/23 2131   98.3 °F (36.8 °C) 86 16 141/78 100 %      Temp Source Heart Rate Source Patient Position - Orthostatic VS BP Location FiO2 (%)   11/06/23 2131 11/06/23 2131 11/06/23 2131 11/06/23 2131 --   Oral Monitor Lying Left arm       Pain Score       11/06/23 2209       9           Vitals:    11/06/23 2131 11/06/23 2338   BP: 141/78 123/70   Pulse: 86 101   Patient Position - Orthostatic VS: Lying Lying         Visual Acuity      ED Medications  Medications   sodium chloride 0.9 % bolus 1,000 mL (0 mL Intravenous Stopped 11/6/23 2316)   ketorolac (TORADOL) injection 15 mg (15 mg Intravenous Given 11/6/23 2209)   iohexol (OMNIPAQUE) 350 MG/ML injection (MULTI-DOSE) 100 mL (100 mL Intravenous Given 11/6/23 2249)   cephalexin (KEFLEX) capsule 500 mg (500 mg Oral Given 11/6/23 2334)   potassium chloride (K-DUR,KLOR-CON) CR tablet 40 mEq (40 mEq Oral Given 11/6/23 2334)   oxyCODONE-acetaminophen (PERCOCET) 5-325 mg per tablet 1 tablet (1 tablet Oral Given 11/6/23 2340)       Diagnostic Studies  Results Reviewed       Procedure Component Value Units Date/Time    Chlamydia/GC amplified DNA by PCR [[id number]] Collected: 11/06/23 2317    Lab Status:  In process Specimen: Genital from Urine, Other Updated: 11/06/23 2338    Urine Microscopic [[id number]]  (Abnormal) Collected: 11/06/23 2209    Lab Status: Final result Specimen: Urine, Clean Catch Updated: 11/06/23 2259     RBC, UA 4-10 /hpf      WBC, UA 0-1 /hpf      Epithelial Cells Occasional /hpf      Bacteria, UA       Field obscured, unable to enumerate     /hpf     AMORPH URATES Innumerable /hpf     HS Troponin 0hr (reflex protocol) [[id number]]  (Normal) Collected: 11/06/23 2148    Lab Status: Final result Specimen: Blood from Line, Venous Updated: 11/06/23 2247     hs TnI 0hr <2 ng/L     Comprehensive metabolic panel [[id number]]  (Abnormal) Collected: 11/06/23 2208    Lab Status: Final result Specimen: Blood from Line, Venous Updated: 11/06/23 2242     Sodium 138 mmol/L      Potassium 3.1 mmol/L      Chloride 103 mmol/L      CO2 27 mmol/L      ANION GAP 8 mmol/L      BUN 16 mg/dL      Creatinine 0.52 mg/dL      Glucose 118 mg/dL      Calcium 9.5 mg/dL      AST 45 U/L      ALT 40 U/L      Alkaline Phosphatase 80 U/L      Total Protein 7.5 g/dL      Albumin 4.8 g/dL      Total Bilirubin 0.53 mg/dL      eGFR 130 ml/min/1.73sq m     Narrative:      [place] guidelines for Chronic Kidney Disease (CKD):     Stage 1 with normal or high GFR (GFR > 90 mL/min/1.73 square meters)    Stage 2 Mild CKD (GFR = 60-89 mL/min/1.73 square meters)    Stage 3A Moderate CKD (GFR = 45-59 mL/min/1.73 square meters)    Stage 3B Moderate CKD (GFR = 30-44 mL/min/1.73 square meters)    Stage 4 Severe CKD (GFR = 15-29 mL/min/1.73 square meters)    Stage 5 End Stage CKD (GFR <15 mL/min/1.73 square meters)  Note: GFR calculation is accurate only with a steady state creatinine    Lipase [[id number]]  (Normal) Collected: 11/06/23 2208    Lab Status: Final result Specimen: Blood from Line, Venous Updated: 11/06/23 2242     Lipase 25 u/L     UA w Reflex to Microscopic w Reflex to Culture [[id number]]  (Abnormal) Collected: 11/06/23 2209    Lab Status: Final result Specimen: Urine, Clean Catch Updated: 11/06/23 2235     Color, UA Amelie     Clarity, UA Cloudy     Specific Gravity, UA 1.015     pH, UA 7.0     Leukocytes, UA Negative     Nitrite, UA Negative     Protein, UA Negative mg/dl      Glucose, UA Negative mg/dl      Ketones, UA Negative mg/dl      Bilirubin, UA Negative     Occult Blood, .0     UROBILINOGEN UA 1.0 mg/dL     CBC and differential [[id number]]  (Abnormal) Collected: 11/06/23 2208    Lab Status: Final result Specimen: Blood from Line, Venous Updated: 11/06/23 2224     WBC 13.93 Thousand/uL      RBC 4.02 Million/uL      Hemoglobin 12.8 g/dL      Hematocrit 37.4 %      MCV 93 fL      MCH 31.8 pg      MCHC 34.2 g/dL      RDW 12.4 %      MPV 11.1 fL      Platelets 961 Thousands/uL      nRBC 0 /100 WBCs      Neutrophils Relative 65 %      Immat GRANS % 0 %      Lymphocytes Relative 31 %      Monocytes Relative 4 %      Eosinophils Relative 0 %      Basophils Relative 0 %      Neutrophils Absolute 9.01 Thousands/µL      Immature Grans Absolute 0.04 Thousand/uL      Lymphocytes Absolute 4.26 Thousands/µL      Monocytes Absolute 0.52 Thousand/µL      Eosinophils Absolute 0.06 Thousand/µL      Basophils Absolute 0.04 Thousands/µL     POCT pregnancy, urine [[id number]]  (Normal) Resulted: 11/06/23 2200    Lab Status: Final result Updated: 11/06/23 2201     EXT Preg Test, Ur Negative     Control Valid                   CT abdomen pelvis with contrast   Final Result by Kat Sheppard MD (11/06 2316)      No acute inflammatory process identified within the abdomen or pelvis. Workstation performed: [id number]         XR chest 1 view portable   ED Interpretation by Josselyn Rangel PA-C (11/06 2248)   NAD      Final Result by Velasquez Saba MD (11/06 2257)      No acute cardiopulmonary disease.                   Workstation performed: [id number]                    Procedures  ECG 12 Lead Documentation Only    Date/Time: 11/6/2023 10:24 PM    Performed by: Josselyn Rangel PA-C  Authorized by: Josselyn Rangel PA-C    Indications / Diagnosis:  Chest pain  ECG reviewed by me, the ED Provider: yes    Patient location:  ED  Interpretation:     Interpretation: normal    Rate:     ECG rate:  92    ECG rate assessment: normal    Rhythm:     Rhythm: sinus rhythm    Ectopy:     Ectopy: none    QRS:     QRS axis:  Normal  Conduction:     Conduction: normal    ST segments:     ST segments:  Normal  T waves:     T waves: normal             ED Course  ED Course as of 11/06/23 2346   Mon Nov 06, 2023   2202 Patient now complains of chest pain   2316 Reviewed current results with patient, awaiting CT results, pain improved, patient appears much more comfortable. 2339 Patient states pain is returning upon discharge, percocet ordered. Discussed pending cultures with patient, will call if abnormal.             HEART Risk Score      Flowsheet Row Most Recent Value   Heart Score Risk Calculator    History 0 Filed at: 11/06/2023 2331   ECG 0 Filed at: 11/06/2023 2331   Age 0 Filed at: 11/06/2023 2331   Risk Factors 1 Filed at: 11/06/2023 2331   Troponin 0 Filed at: 11/06/2023 2331   HEART Score 1 Filed at: 11/06/2023 2331                          SBIRT 20yo+      Flowsheet Row Most Recent Value   Initial Alcohol Screen: US AUDIT-C     1. How often do you have a drink containing alcohol? 0 Filed at: 11/06/2023 2131   2. How many drinks containing alcohol do you have on a typical day you are drinking? 0 Filed at: 11/06/2023 2131   3b. FEMALE Any Age, or MALE 65+: How often do you have 4 or more drinks on one occassion? 0 Filed at: 11/06/2023 2131   Audit-C Score 0 Filed at: 11/06/2023 2131   ALISIA: How many times in the past year have you. .. Used an illegal drug or used a prescription medication for non-medical reasons? Never Filed at: 11/06/2023 2131                      Medical Decision Making  Differential diagnosis includes but is not limited to: gastritis, pud, pancreatitis, kidney stone, pyelo, mesenteric ischemia, std    Problems Addressed:  UTI (urinary tract infection): acute illness or injury    Amount and/or Complexity of Data Reviewed  Labs: ordered. Details: reviewed  Radiology: ordered and independent interpretation performed. Details: reviewed  ECG/medicine tests: ordered and independent interpretation performed. Risk  Prescription drug management.              Disposition  Final diagnoses:   UTI (urinary tract infection)   Abdominal pain   Chest pain Hypokalemia     Time reflects when diagnosis was documented in both MDM as applicable and the Disposition within this note       Time User Action Codes Description Comment    11/6/2023 11:29 PM Edith Lower L Add [N39.0] UTI (urinary tract infection)     11/6/2023 11:32 PM Edith Lower L Add [R10.9] Abdominal pain     11/6/2023 11:32 PM Lorette Romo Add [R07.9] Chest pain     11/6/2023 11:39 PM Lorette Romo Add [E87.6] Hypokalemia           ED Disposition       ED Disposition   Discharge    Condition   Stable    Date/Time   Mon Nov 6, 2023 3896    [address] discharge to home/self care. Follow-up Information       Follow up With Specialties Details Why Contact Info Additional [address] [facility] In 3 days  [address] [facility] Emergency Department Emergency Medicine  If symptoms worsen [address] [id number]  [address] [facility] Emergency Department            Patient's Medications   Discharge Prescriptions    CEPHALEXIN (KEFLEX) 500 MG CAPSULE    Take 1 capsule (500 mg total) by mouth 3 (three) times a day for 7 days       Start Date: 11/6/2023 End Date: 11/13/2023       Order Dose: 500 mg       Quantity: 21 capsule    Refills: 0    NAPROXEN (NAPROSYN) 500 MG TABLET    Take 1 tablet (500 mg total) by mouth 2 (two) times a day with meals for 7 days       Start Date: 11/6/2023 End Date: 11/13/2023       Order Dose: 500 mg       Quantity: 14 tablet    Refills: 0       No discharge procedures on file.     PDMP Review       None            ED Provider  Electronically Signed by             Syl Fox PA-C  11/06/23 1990 [facility] ABHISHEK Velazquez  11/06/23 7801

## 2023-11-08 LAB
C TRACH DNA SPEC QL NAA+PROBE: NEGATIVE
N GONORRHOEA DNA SPEC QL NAA+PROBE: NEGATIVE

## 2024-03-10 ENCOUNTER — HOSPITAL ENCOUNTER (OUTPATIENT)
Facility: HOSPITAL | Age: 29
Setting detail: OBSERVATION
Discharge: HOME | End: 2024-03-11
Attending: EMERGENCY MEDICINE | Admitting: SURGERY
Payer: MEDICARE

## 2024-03-10 DIAGNOSIS — N83.209 SIMPLE OVARIAN CYST: Primary | ICD-10-CM

## 2024-03-10 DIAGNOSIS — R93.5 ABNORMAL CT OF THE ABDOMEN: ICD-10-CM

## 2024-03-10 DIAGNOSIS — K35.80 ACUTE APPENDICITIS, UNSPECIFIED ACUTE APPENDICITIS TYPE: ICD-10-CM

## 2024-03-10 DIAGNOSIS — R10.32 LLQ PAIN: ICD-10-CM

## 2024-03-10 DIAGNOSIS — N83.202 LEFT OVARIAN CYST: ICD-10-CM

## 2024-03-10 LAB
ALBUMIN SERPL BCP-MCNC: 4.4 G/DL (ref 3.5–5)
ALP SERPL-CCNC: 88 U/L (ref 34–104)
ALT SERPL W P-5'-P-CCNC: 60 U/L (ref 7–52)
ANION GAP SERPL CALCULATED.3IONS-SCNC: 6 MMOL/L
AST SERPL W P-5'-P-CCNC: 29 U/L (ref 13–39)
BASOPHILS # BLD AUTO: 0.02 THOUSANDS/ÂΜL (ref 0–0.1)
BASOPHILS NFR BLD AUTO: 0 % (ref 0–1)
BILIRUB SERPL-MCNC: 0.42 MG/DL (ref 0.2–1)
BILIRUB UR QL STRIP: NEGATIVE
BUN SERPL-MCNC: 11 MG/DL (ref 5–25)
CALCIUM SERPL-MCNC: 9.3 MG/DL (ref 8.4–10.2)
CHLORIDE SERPL-SCNC: 108 MMOL/L (ref 96–108)
CLARITY UR: CLEAR
CO2 SERPL-SCNC: 24 MMOL/L (ref 21–32)
COLOR UR: ABNORMAL
CREAT SERPL-MCNC: 0.53 MG/DL (ref 0.6–1.3)
EOSINOPHIL # BLD AUTO: 0.07 THOUSAND/ÂΜL (ref 0–0.61)
EOSINOPHIL NFR BLD AUTO: 1 % (ref 0–6)
ERYTHROCYTE [DISTWIDTH] IN BLOOD BY AUTOMATED COUNT: 12.1 % (ref 11.6–15.1)
EXT PREGNANCY TEST URINE: NEGATIVE
EXT. CONTROL: NORMAL
GFR SERPL CREATININE-BSD FRML MDRD: 129 ML/MIN/1.73SQ M
GLUCOSE SERPL-MCNC: 103 MG/DL (ref 65–140)
GLUCOSE UR STRIP-MCNC: NEGATIVE MG/DL
HCT VFR BLD AUTO: 33.6 % (ref 34.8–46.1)
HGB BLD-MCNC: 11.5 G/DL (ref 11.5–15.4)
HGB UR QL STRIP.AUTO: NEGATIVE
IMM GRANULOCYTES # BLD AUTO: 0.02 THOUSAND/UL (ref 0–0.2)
IMM GRANULOCYTES NFR BLD AUTO: 0 % (ref 0–2)
KETONES UR STRIP-MCNC: NEGATIVE MG/DL
LEUKOCYTE ESTERASE UR QL STRIP: NEGATIVE
LYMPHOCYTES # BLD AUTO: 2.01 THOUSANDS/ÂΜL (ref 0.6–4.47)
LYMPHOCYTES NFR BLD AUTO: 27 % (ref 14–44)
MCH RBC QN AUTO: 31.3 PG (ref 26.8–34.3)
MCHC RBC AUTO-ENTMCNC: 34.2 G/DL (ref 31.4–37.4)
MCV RBC AUTO: 92 FL (ref 82–98)
MONOCYTES # BLD AUTO: 0.3 THOUSAND/ÂΜL (ref 0.17–1.22)
MONOCYTES NFR BLD AUTO: 4 % (ref 4–12)
NEUTROPHILS # BLD AUTO: 4.98 THOUSANDS/ÂΜL (ref 1.85–7.62)
NEUTS SEG NFR BLD AUTO: 68 % (ref 43–75)
NITRITE UR QL STRIP: NEGATIVE
NRBC BLD AUTO-RTO: 0 /100 WBCS
PH UR STRIP.AUTO: 6.5 [PH]
PLATELET # BLD AUTO: 276 THOUSANDS/UL (ref 149–390)
PMV BLD AUTO: 10.6 FL (ref 8.9–12.7)
POTASSIUM SERPL-SCNC: 3.7 MMOL/L (ref 3.5–5.3)
PROT SERPL-MCNC: 7.4 G/DL (ref 6.4–8.4)
PROT UR STRIP-MCNC: NEGATIVE MG/DL
RBC # BLD AUTO: 3.67 MILLION/UL (ref 3.81–5.12)
SODIUM SERPL-SCNC: 138 MMOL/L (ref 135–147)
SP GR UR STRIP.AUTO: 1.02 (ref 1–1.03)
UROBILINOGEN UR STRIP-ACNC: 2 MG/DL
WBC # BLD AUTO: 7.4 THOUSAND/UL (ref 4.31–10.16)

## 2024-03-10 PROCEDURE — 81025 URINE PREGNANCY TEST: CPT

## 2024-03-10 PROCEDURE — 80053 COMPREHEN METABOLIC PANEL: CPT

## 2024-03-10 PROCEDURE — 36415 COLL VENOUS BLD VENIPUNCTURE: CPT

## 2024-03-10 PROCEDURE — 81003 URINALYSIS AUTO W/O SCOPE: CPT

## 2024-03-10 PROCEDURE — 85025 COMPLETE CBC W/AUTO DIFF WBC: CPT

## 2024-03-10 PROCEDURE — 96375 TX/PRO/DX INJ NEW DRUG ADDON: CPT

## 2024-03-10 PROCEDURE — 99285 EMERGENCY DEPT VISIT HI MDM: CPT

## 2024-03-10 PROCEDURE — 99284 EMERGENCY DEPT VISIT MOD MDM: CPT

## 2024-03-10 RX ORDER — KETOROLAC TROMETHAMINE 30 MG/ML
15 INJECTION, SOLUTION INTRAMUSCULAR; INTRAVENOUS ONCE
Status: COMPLETED | OUTPATIENT
Start: 2024-03-10 | End: 2024-03-10

## 2024-03-10 RX ADMIN — KETOROLAC TROMETHAMINE 15 MG: 30 INJECTION, SOLUTION INTRAMUSCULAR; INTRAVENOUS at 23:28

## 2024-03-10 NOTE — LETTER
[facility] EMERGENCY DEPARTMENT  [address] [address]  Dept: [phone]    March 11, 2024     Patient: Gi Monroe   YOB: 1995   Date of Visit: 3/10/2024       To Whom it May Concern:    Gi Monroe is under my professional care. She was seen in the hospital from 3/10/2024 to 03/11/24. She may return to work on 3/13/24 without limitations.           Sincerely,          Ap Nogueira MD

## 2024-03-11 ENCOUNTER — APPOINTMENT (EMERGENCY)
Dept: ULTRASOUND IMAGING | Facility: HOSPITAL | Age: 29
End: 2024-03-11
Payer: MEDICARE

## 2024-03-11 ENCOUNTER — APPOINTMENT (EMERGENCY)
Dept: CT IMAGING | Facility: HOSPITAL | Age: 29
End: 2024-03-11
Payer: MEDICARE

## 2024-03-11 VITALS
SYSTOLIC BLOOD PRESSURE: 129 MMHG | HEART RATE: 96 BPM | RESPIRATION RATE: 16 BRPM | TEMPERATURE: 97.4 F | WEIGHT: 177.69 LBS | OXYGEN SATURATION: 98 % | DIASTOLIC BLOOD PRESSURE: 77 MMHG

## 2024-03-11 PROBLEM — N83.209 SIMPLE OVARIAN CYST: Status: ACTIVE | Noted: 2024-03-11

## 2024-03-11 LAB
PLATELET # BLD AUTO: 292 THOUSANDS/UL (ref 149–390)
PMV BLD AUTO: 10.3 FL (ref 8.9–12.7)

## 2024-03-11 PROCEDURE — 74177 CT ABD & PELVIS W/CONTRAST: CPT

## 2024-03-11 PROCEDURE — 96367 TX/PROPH/DG ADDL SEQ IV INF: CPT

## 2024-03-11 PROCEDURE — 76830 TRANSVAGINAL US NON-OB: CPT

## 2024-03-11 PROCEDURE — NC001 PR NO CHARGE: Performed by: SURGERY

## 2024-03-11 PROCEDURE — 36415 COLL VENOUS BLD VENIPUNCTURE: CPT

## 2024-03-11 PROCEDURE — 85049 AUTOMATED PLATELET COUNT: CPT

## 2024-03-11 PROCEDURE — 96365 THER/PROPH/DIAG IV INF INIT: CPT

## 2024-03-11 PROCEDURE — 99243 OFF/OP CNSLTJ NEW/EST LOW 30: CPT | Performed by: OBSTETRICS & GYNECOLOGY

## 2024-03-11 PROCEDURE — 76856 US EXAM PELVIC COMPLETE: CPT

## 2024-03-11 RX ORDER — POTASSIUM CHLORIDE 20 MEQ/1
40 TABLET, EXTENDED RELEASE ORAL ONCE
Status: COMPLETED | OUTPATIENT
Start: 2024-03-11 | End: 2024-03-11

## 2024-03-11 RX ORDER — FENTANYL CITRATE/PF 50 MCG/ML
50 SYRINGE (ML) INJECTION
OUTPATIENT
Start: 2024-03-11

## 2024-03-11 RX ORDER — SODIUM CHLORIDE, SODIUM LACTATE, POTASSIUM CHLORIDE, CALCIUM CHLORIDE 600; 310; 30; 20 MG/100ML; MG/100ML; MG/100ML; MG/100ML
125 INJECTION, SOLUTION INTRAVENOUS CONTINUOUS
Status: DISCONTINUED | OUTPATIENT
Start: 2024-03-11 | End: 2024-03-11

## 2024-03-11 RX ORDER — ONDANSETRON 2 MG/ML
4 INJECTION INTRAMUSCULAR; INTRAVENOUS ONCE AS NEEDED
OUTPATIENT
Start: 2024-03-11

## 2024-03-11 RX ORDER — ONDANSETRON 2 MG/ML
4 INJECTION INTRAMUSCULAR; INTRAVENOUS EVERY 4 HOURS PRN
Status: DISCONTINUED | OUTPATIENT
Start: 2024-03-11 | End: 2024-03-11 | Stop reason: HOSPADM

## 2024-03-11 RX ORDER — METRONIDAZOLE 500 MG/100ML
500 INJECTION, SOLUTION INTRAVENOUS EVERY 8 HOURS
Status: DISCONTINUED | OUTPATIENT
Start: 2024-03-11 | End: 2024-03-11

## 2024-03-11 RX ORDER — CEFTRIAXONE 1 G/50ML
1000 INJECTION, SOLUTION INTRAVENOUS EVERY 24 HOURS
Status: DISCONTINUED | OUTPATIENT
Start: 2024-03-11 | End: 2024-03-11

## 2024-03-11 RX ORDER — ACETAMINOPHEN 325 MG/1
975 TABLET ORAL EVERY 8 HOURS SCHEDULED
Status: DISCONTINUED | OUTPATIENT
Start: 2024-03-11 | End: 2024-03-11 | Stop reason: HOSPADM

## 2024-03-11 RX ORDER — HYDROMORPHONE HCL/PF 1 MG/ML
0.5 SYRINGE (ML) INJECTION
OUTPATIENT
Start: 2024-03-11

## 2024-03-11 RX ORDER — ENOXAPARIN SODIUM 100 MG/ML
40 INJECTION SUBCUTANEOUS DAILY
Status: DISCONTINUED | OUTPATIENT
Start: 2024-03-11 | End: 2024-03-11

## 2024-03-11 RX ORDER — OXYCODONE HYDROCHLORIDE 5 MG/1
5 TABLET ORAL EVERY 4 HOURS PRN
Status: DISCONTINUED | OUTPATIENT
Start: 2024-03-11 | End: 2024-03-11 | Stop reason: HOSPADM

## 2024-03-11 RX ORDER — HYDROMORPHONE HCL IN WATER/PF 6 MG/30 ML
0.2 PATIENT CONTROLLED ANALGESIA SYRINGE INTRAVENOUS EVERY 2 HOUR PRN
Status: DISCONTINUED | OUTPATIENT
Start: 2024-03-11 | End: 2024-03-11 | Stop reason: HOSPADM

## 2024-03-11 RX ORDER — PROMETHAZINE HYDROCHLORIDE 25 MG/ML
6.25 INJECTION, SOLUTION INTRAMUSCULAR; INTRAVENOUS ONCE AS NEEDED
OUTPATIENT
Start: 2024-03-11

## 2024-03-11 RX ORDER — ACETAMINOPHEN 325 MG/1
975 TABLET ORAL EVERY 8 HOURS SCHEDULED
Start: 2024-03-11

## 2024-03-11 RX ORDER — MEPERIDINE HYDROCHLORIDE 25 MG/ML
12.5 INJECTION INTRAMUSCULAR; INTRAVENOUS; SUBCUTANEOUS ONCE AS NEEDED
OUTPATIENT
Start: 2024-03-11

## 2024-03-11 RX ADMIN — IOHEXOL 100 ML: 350 INJECTION, SOLUTION INTRAVENOUS at 02:34

## 2024-03-11 RX ADMIN — POTASSIUM CHLORIDE 40 MEQ: 1500 TABLET, EXTENDED RELEASE ORAL at 09:28

## 2024-03-11 RX ADMIN — METRONIDAZOLE 500 MG: 500 INJECTION, SOLUTION INTRAVENOUS at 06:52

## 2024-03-11 RX ADMIN — CEFTRIAXONE 1000 MG: 1 INJECTION, SOLUTION INTRAVENOUS at 06:29

## 2024-03-11 RX ADMIN — ACETAMINOPHEN 325MG 975 MG: 325 TABLET ORAL at 08:02

## 2024-03-11 RX ADMIN — SODIUM CHLORIDE, SODIUM LACTATE, POTASSIUM CHLORIDE, AND CALCIUM CHLORIDE 125 ML/HR: .6; .31; .03; .02 INJECTION, SOLUTION INTRAVENOUS at 06:29

## 2024-03-11 RX ADMIN — ACETAMINOPHEN 325MG 975 MG: 325 TABLET ORAL at 13:07

## 2024-03-11 NOTE — CONSULTS
"Consultation - Gynecology  Gi Monroe 28 y.o. female MRN: [id number]  Unit/Bed#: ED-15 Encounter: [id number]      Consults      Chief Complaint   Patient presents with    Abdominal Pain     Pt reports LLQ abd pain that started 3 months ago.  Pain was intermittent but has become more constant       Assessment/Plan      * Simple ovarian cyst  Assessment & Plan  Pelvic U/S IMPRESSION: Left ovarian simple cyst measuring 4.6 cm. No evidence of torsion. Based on the ACR O-RADS system, this is O-RADS category 2 (almost certainly benign with <1% risk of malignancy).     Discussed that surgical intervention of her cyst is not indicated at this time.  Plan for follow up with her primary GYN. [facility] office information provided as well.           History of Present Illness:  Physician Requesting Consult: Gabrielle Dutta MD  Reason for Consult / Principal Problem: ovarian cyst  HPI: Gi Monroe is a 28 y.o.  female who presents with periumbilical pain. She reports having had this pain for the past few months however it was worse overnight which brought her into the ER. She denies any nausea, vomiting, fevers, chills, diarrhea or constipation. She feels better now s/p pain medication in the ER. Her pain is currently \"so so.\"   She has previously had 3 SVDs and 2 miscarriages. Her LMP is \"early March.\" She has regular menses lasting a week though this period has been slightly longer with additional days of spotting. She has had her Nexplanon in place for a year, since delivering her last baby.       Historical Information   Past Medical History:   Diagnosis Date    No known health problems      History reviewed. No pertinent surgical history.  OB History    Para Term  AB Living   5 3 3   2 2   SAB IAB Ectopic Multiple Live Births   2       2      # Outcome Date GA Lbr Nathan/2nd Weight Sex Delivery Anes PTL Lv   5 Term 23    F Vag-Spont   JAN   4 Term 19    F Vag-Spont      3 2017    "        2 SAB 2013           1 Term 10/14/12    M Vag-Spont   JAN     History reviewed. No pertinent family history.  Social History   Social History     Substance and Sexual Activity   Alcohol Use No     Social History     Substance and Sexual Activity   Drug Use No     Social History     Tobacco Use   Smoking Status Never   Smokeless Tobacco Never     No Known Allergies    Objective   Vitals: Blood pressure 129/77, pulse 96, temperature (!) 97.4 °F (36.3 °C), temperature source Oral, resp. rate 16, weight 80.6 kg (177 lb 11.1 oz), last menstrual period 03/02/2024, SpO2 98%, not currently breastfeeding. There is no height or weight on file to calculate BMI.    Invasive Devices       Peripheral Intravenous Line  Duration             Peripheral IV 03/10/24 Left Antecubital <1 day                    Physical Exam  Exam conducted with a chaperone present.   Constitutional:       Appearance: Normal appearance.   HENT:      Head: Normocephalic and atraumatic.   Eyes:      Extraocular Movements: Extraocular movements intact.   Cardiovascular:      Pulses: Normal pulses.   Pulmonary:      Effort: Pulmonary effort is normal.   Abdominal:      General: There is no distension.      Palpations: Abdomen is soft.      Tenderness: There is no guarding.      Comments: Mild tenderness to palpation in periumbilical area   Genitourinary:     Cervix: No cervical motion tenderness.      Uterus: Normal.       Adnexa: Right adnexa normal and left adnexa normal.   Skin:     General: Skin is warm and dry.   Neurological:      Mental Status: She is alert. Mental status is at baseline.   Psychiatric:         Mood and Affect: Mood normal.         Behavior: Behavior normal.       Lab Results:   Recent Results (from the past 24 hour(s))   POCT pregnancy, urine    Collection Time: 03/10/24 11:28 PM   Result Value Ref Range    EXT Preg Test, Ur Negative     Control Valid    UA w Reflex to Microscopic w Reflex to Culture    Collection Time:  03/10/24 11:30 PM    Specimen: Urine, Clean Catch   Result Value Ref Range    Color, UA Light Yellow     Clarity, UA Clear     Specific Gravity, UA 1.019 1.003 - 1.030    pH, UA 6.5 4.5, 5.0, 5.5, 6.0, 6.5, 7.0, 7.5, 8.0    Leukocytes, UA Negative Negative    Nitrite, UA Negative Negative    Protein, UA Negative Negative mg/dl    Glucose, UA Negative Negative mg/dl    Ketones, UA Negative Negative mg/dl    Urobilinogen, UA 2.0 (A) <2.0 mg/dl mg/dl    Bilirubin, UA Negative Negative    Occult Blood, UA Negative Negative   CBC and differential    Collection Time: 03/10/24 11:30 PM   Result Value Ref Range    WBC 7.40 4.31 - 10.16 Thousand/uL    RBC 3.67 (L) 3.81 - 5.12 Million/uL    Hemoglobin 11.5 11.5 - 15.4 g/dL    Hematocrit 33.6 (L) 34.8 - 46.1 %    MCV 92 82 - 98 fL    MCH 31.3 26.8 - 34.3 pg    MCHC 34.2 31.4 - 37.4 g/dL    RDW 12.1 11.6 - 15.1 %    MPV 10.6 8.9 - 12.7 fL    Platelets 276 149 - 390 Thousands/uL    nRBC 0 /100 WBCs    Neutrophils Relative 68 43 - 75 %    Immat GRANS % 0 0 - 2 %    Lymphocytes Relative 27 14 - 44 %    Monocytes Relative 4 4 - 12 %    Eosinophils Relative 1 0 - 6 %    Basophils Relative 0 0 - 1 %    Neutrophils Absolute 4.98 1.85 - 7.62 Thousands/µL    Immature Grans Absolute 0.02 0.00 - 0.20 Thousand/uL    Lymphocytes Absolute 2.01 0.60 - 4.47 Thousands/µL    Monocytes Absolute 0.30 0.17 - 1.22 Thousand/µL    Eosinophils Absolute 0.07 0.00 - 0.61 Thousand/µL    Basophils Absolute 0.02 0.00 - 0.10 Thousands/µL   Comprehensive metabolic panel    Collection Time: 03/10/24 11:30 PM   Result Value Ref Range    Sodium 138 135 - 147 mmol/L    Potassium 3.7 3.5 - 5.3 mmol/L    Chloride 108 96 - 108 mmol/L    CO2 24 21 - 32 mmol/L    ANION GAP 6 mmol/L    BUN 11 5 - 25 mg/dL    Creatinine 0.53 (L) 0.60 - 1.30 mg/dL    Glucose 103 65 - 140 mg/dL    Calcium 9.3 8.4 - 10.2 mg/dL    AST 29 13 - 39 U/L    ALT 60 (H) 7 - 52 U/L    Alkaline Phosphatase 88 34 - 104 U/L    Total Protein 7.4 6.4 -  8.4 g/dL    Albumin 4.4 3.5 - 5.0 g/dL    Total Bilirubin 0.42 0.20 - 1.00 mg/dL    eGFR 129 ml/min/1.73sq m   Platelet count    Collection Time: 03/11/24 12:18 PM   Result Value Ref Range    Platelets 292 149 - 390 Thousands/uL    MPV 10.3 8.9 - 12.7 fL       Gi Casanova MD  PGY-4 OB/GYN   3/11/2024 1:37 PM

## 2024-03-11 NOTE — ED CARE HANDOFF
Emergency Department Sign Out Note        Sign out and transfer of care from Ryan Smith PA-C. See Separate Emergency Department note.     The patient, Gi Monroe, was evaluated by the previous provider for LLQ pain.    Workup Completed:  Given Lactated ringers, Rocephin, Flagyl and Toradol  CMP  CBC  Urine  POCT pregnancy   CT abdomen pelvis with contrast    ED Course / Workup Pending (followup):  0614 Per sign out, CT has multiple findings. General surgery saw patient and would like US results. Will wait for US and reach back out to surgery.     0614 WBC: 7.40    0615 Hemoglobin: 11.5    0615 Platelet Count: 276    0615 PREGNANCY TEST URINE: Negative    0615 Comprehensive metabolic panel(!)    0615 UA w Reflex to Microscopic w Reflex to Culture(!)    0712 Patient resting comfortably on stretcher waiting for US.     0742 Spoke with Darshan Bansal from general surgery. He would like OB to see the patient after US.     0827 US pelvis complete w transvaginal  Left ovarian simple cyst measuring 4.6 cm. No evidence of torsion. Based on the ACR O-RADS system, this is O-RADS category 2 (almost certainly benign with <1% risk of malignancy). The management recommendation is no additional work-up or follow-up   needed.    0832 Tiger text sent to OB.    0900 Tiger text sent again to OB  .  0944 Tiger text sent again to OB    1006 Tiger text sent again to OB    1008 Spoke with [facility] Mcgovern resident - We just reviewed her imaging looks like simple cyst with no evidence of torsion. Will reach out to Gen surg team since I saw they admitted them    1125 Spencer text to general surgery for disposition plan.    1127 Spoke with Darshan Bansal from general surgery - Yeah they're (OBGYN) gonna see, she'll likely go home    1202    Spoke with [facility] Mc[facility] resident - I'm coming down to see her in a bit, but gen surg resident said they may discharge her. I don't anticipate us doing anything for her cyst other than  outpatient follow up.    1212 Patient waiting to be seen by OBGYN.    1316    Spoke with Darshan Banasl from general surgery. PA will be down to see and discharge patient.     Patient stable at time of discharge by surgery.                                     ED Course as of 03/11/24 1528   Mon Mar 11, 2024   0614 Per sign out, CT has multiple findings. General surgery saw patient and would like US results. Will wait for US and reach back out to surgery.    0614 WBC: 7.40   0615 Hemoglobin: 11.5   0615 Platelet Count: 276   0615 PREGNANCY TEST URINE: Negative   0615 Comprehensive metabolic panel(!)   0615 UA w Reflex to Microscopic w Reflex to Culture(!)   0712 Patient resting comfortably on stretcher waiting for US.    0742 Spoke with Darshan Bansal from general surgery. He would like OB to see the patient after US.    0827 US pelvis complete w transvaginal  Left ovarian simple cyst measuring 4.6 cm. No evidence of torsion. Based on the ACR O-RADS system, this is O-RADS category 2 (almost certainly benign with <1% risk of malignancy). The management recommendation is no additional work-up or follow-up   needed.     0832 Tiger text sent to OB.   0900 Tiger text sent again to OB.   0944 Tiger text sent again to OB   1006 Tiger text sent again to OB   1008 Spoke with Juan José Lane, OBGYN resident - We just reviewed her imaging looks like simple cyst with no evidence of torsion. Will reach out to Gen surg team since I saw they admitted them   1125 [place] text to general surgery for disposition plan.   1127 Spoke with Darshan Bansal from general surgery - Yeah they're (OBGYN) gonna see, she'll likely go home   1212 Patient waiting to be seen by OBGYN.     Procedures  Medical Decision Making  Problems Addressed:  Abnormal CT of the abdomen: acute illness or injury  Left ovarian cyst: acute illness or injury    Amount and/or Complexity of Data Reviewed  Independent Historian:      Details: Patient is historian  Labs: ordered.  Decision-making details documented in ED Course.  Radiology: ordered. Decision-making details documented in ED Course.    Risk  Prescription drug management.            Disposition  Final diagnoses:   Left ovarian cyst   Abnormal CT of the abdomen     Time reflects when diagnosis was documented in both MDM as applicable and the Disposition within this note       Time User Action Codes Description Comment    3/11/2024  6:07 AM Deniz Schilling Add [K35.80] Acute appendicitis, unspecified acute appendicitis type     3/11/2024 11:06 AM Darshan Almendarez Add [R10.32] LLQ pain     3/11/2024  1:19 PM Mala Thapa Modify [K35.80] Acute appendicitis, unspecified acute appendicitis type     3/11/2024  1:19 PM Mala Thapa Add [N83.209] Simple ovarian cyst     3/11/2024  3:28 PM Deborah Gómez Add [N83.202] Left ovarian cyst     3/11/2024  3:28 PM Deborah Gómez Add [R93.5] Abnormal CT of the abdomen           ED Disposition       ED Disposition   Discharge    Condition   --    Date/Time   Mon Mar 11, 2024  1:55 PM    Comment   Gi Monroe discharge to home/self care.                   Follow-up Information       Follow up With Specialties Details Why Contact Info Additional Information    [facility] Follow up As needed [address] [facility] [address]  [phone]       Jersey Aragon MD Internal Medicine   [address] [facility]  [address]  [facility] [address]  [phone]       Gabrielle Dutta MD General Surgery Follow up As needed [address] [address]  [phone]       [facility] Gynecology Call As needed [address]  [address]  [facility] [address] [phone] [facility] [place], [address], [address], [place], [place], [address] [phone]          Discharge Medication List as of 3/11/2024  2:07 PM        START taking these medications    Details   acetaminophen  (TYLENOL) 325 mg tablet Take 3 tablets (975 mg total) by mouth every 8 (eight) hours, Starting Mon 3/11/2024, No Print           CONTINUE these medications which have NOT CHANGED    Details   etonogestrel (Nexplanon) subdermal implant Inject 1 each under the skin, Historical Med           STOP taking these medications       lidocaine (Lidoderm) 5 % Comments:   Reason for Stopping:         methocarbamol (ROBAXIN) 500 mg tablet Comments:   Reason for Stopping:         methocarbamol (ROBAXIN) 500 mg tablet Comments:   Reason for Stopping:         naproxen (NAPROSYN) 500 mg tablet Comments:   Reason for Stopping:             Outpatient Discharge Orders   Discharge Diet     Activity as tolerated     Call provider for:  severe uncontrolled pain          ED Provider  Electronically Signed by     Deborah Gómez PA-C  03/11/24 [id number]

## 2024-03-11 NOTE — H&P
H&P - General Surgery   Gi Monroe 28 y.o. female MRN: [id number]  Unit/Bed#: ED-15 Encounter: [id number]    Assessment/Plan     Assessment:  29 yo f with 3 month hx of waxing and waning LLQ pain with left adnexal mass and c/f for acute appendicitis.     WBC: wnl    3/11 CTCAP: 4.6 cm hypodense structure in the left adnexa could represent ovarian cyst. Nonspecific pericholecystic stranding. Fluid filled appendix distended up to 9mm w/ subtle mucosal hyper enhancement. No periappendiceal free air or abscess/fluid collection.     Plan:  -pelvic US  -consented for diagnostic lap psb lap appy  -NPO  -IVFs  -IV abx  -DVT ppx  -analgesia and anti-emetics prn.     History of Present Illness     HPI:  Gi Monroe is a 28 y.o. female w/o relevant pmhx who presents after 3 months of waxing and waning LLQ pain and bilateral flank pain. She denies fever chills or night sweats. Denies any post prandial pain. Reports her pain comes and goes and is most notably along the LLQ. She denies changes in her bowel habits, denies urinary complaints. She reports she has had this pain for several months and is unsure of any triggering events. She would like to do anything to make sure she does not have this pain again.     Consults    Review of Systems  Negative except where mentioned above  Historical Information   Past Medical History:   Diagnosis Date    No known health problems      History reviewed. No pertinent surgical history.  Social History   Social History     Substance and Sexual Activity   Alcohol Use No     Social History     Substance and Sexual Activity   Drug Use No     E-Cigarette/Vaping    E-Cigarette Use Never User      E-Cigarette/Vaping Substances     Social History     Tobacco Use   Smoking Status Never   Smokeless Tobacco Never     Family History: History reviewed. No pertinent family history.    Meds/Allergies   PTA meds:   Prior to Admission Medications   Prescriptions Last Dose Informant Patient  Reported? Taking?   etonogestrel (Nexplanon) subdermal implant   Yes No   Sig: Inject 1 each under the skin   lidocaine (Lidoderm) 5 %   No No   Sig: Apply 1 patch topically over 12 hours daily Remove & Discard patch within 12 hours or as directed by MD   methocarbamol (ROBAXIN) 500 mg tablet   No No   Sig: Take 1 tablet (500 mg total) by mouth 2 (two) times a day   Patient not taking: Reported on 7/11/2023   methocarbamol (ROBAXIN) 500 mg tablet   No No   Sig: Take 1 tablet (500 mg total) by mouth 2 (two) times a day for 7 days   naproxen (NAPROSYN) 500 mg tablet   No No   Sig: Take 1 tablet (500 mg total) by mouth 2 (two) times a day with meals for 7 days      Facility-Administered Medications: None     No Known Allergies    Objective   First Vitals:   Blood Pressure: 130/67 (03/10/24 2219)  Pulse: 77 (03/10/24 2219)  Temperature: (!) 97.4 °F (36.3 °C) (03/10/24 2219)  Temp Source: Oral (03/10/24 2219)  Respirations: 16 (03/10/24 2219)  Weight - Scale: 80.6 kg (177 lb 11.1 oz) (03/10/24 2219)  SpO2: 97 % (03/10/24 2219)    Current Vitals:   Blood Pressure: 106/57 (03/11/24 0351)  Pulse: 105 (03/11/24 0351)  Temperature: (!) 97.4 °F (36.3 °C) (03/10/24 2219)  Temp Source: Oral (03/10/24 2219)  Respirations: 17 (03/11/24 0351)  Weight - Scale: 80.6 kg (177 lb 11.1 oz) (03/10/24 2219)  SpO2: 98 % (03/11/24 0351)    No intake or output data in the 24 hours ending 03/11/24 0511    Invasive Devices       Peripheral Intravenous Line  Duration             Peripheral IV 03/10/24 Left Antecubital <1 day                    Physical Exam  Constitutional:       General: She is not in acute distress.     Appearance: Normal appearance. She is ill-appearing. She is not diaphoretic.   HENT:      Nose: Nose normal.      Mouth/Throat:      Mouth: Mucous membranes are moist.   Eyes:      Extraocular Movements: Extraocular movements intact.      Pupils: Pupils are equal, round, and reactive to light.   Cardiovascular:      Rate and  Rhythm: Normal rate.      Pulses: Normal pulses.   Pulmonary:      Effort: Pulmonary effort is normal.   Abdominal:      Comments: Soft, non distended, ttp LLQ, no focal tenderness to RLQ or RUQ   Skin:     General: Skin is warm.   Neurological:      General: No focal deficit present.      Mental Status: She is alert.       Lab Results: I have personally reviewed pertinent lab results.  , CBC:   Lab Results   Component Value Date    WBC 7.40 03/10/2024    HGB 11.5 03/10/2024    HCT 33.6 (L) 03/10/2024    MCV 92 03/10/2024     03/10/2024    RBC 3.67 (L) 03/10/2024    MCH 31.3 03/10/2024    MCHC 34.2 03/10/2024    RDW 12.1 03/10/2024    MPV 10.6 03/10/2024    NRBC 0 03/10/2024   , CMP:   Lab Results   Component Value Date    SODIUM 138 03/10/2024    K 3.7 03/10/2024     03/10/2024    CO2 24 03/10/2024    BUN 11 03/10/2024    CREATININE 0.53 (L) 03/10/2024    CALCIUM 9.3 03/10/2024    AST 29 03/10/2024    ALT 60 (H) 03/10/2024    ALKPHOS 88 03/10/2024    EGFR 129 03/10/2024     Imaging: I have personally reviewed pertinent reports.   and I have personally reviewed pertinent films in PACS  EKG, Pathology, and Other Studies: I have personally reviewed pertinent reports.   and I have personally reviewed pertinent films in PACS    Counseling / Coordination of Care  Total floor / unit time spent today 25 minutes.  Greater than 50% of total time was spent with the patient and / or family counseling and / or coordination of care.

## 2024-03-11 NOTE — ED PROVIDER NOTES
History  Chief Complaint   Patient presents with    Abdominal Pain     Pt reports LLQ abd pain that started 3 months ago.  Pain was intermittent but has become more constant     28-year-old female presents to ED for evaluation of left lower quadrant abdominal pain.  Patient states that the pain is been present for approximately 3 months.  The pain comes and goes however recently it has been more severe and constant.  Previously has had imaging performed due to the pain.  No concerning findings from these studies.  Those studies were CT abdomen pelvis with IV contrast.  Patient denies change in pain with food consumption, body position, time of day.  Denies constipation, diarrhea, nausea, vomiting, shortness of breath, chest pain, fever, chills, dysuria, increased urinary frequency, hematuria.  Denies history of Crohn's disease, ulcerative colitis, diverticulitis, ovarian cyst, ovarian torsion.         Prior to Admission Medications   Prescriptions Last Dose Informant Patient Reported? Taking?   etonogestrel (Nexplanon) subdermal implant   Yes No   Sig: Inject 1 each under the skin   lidocaine (Lidoderm) 5 %   No No   Sig: Apply 1 patch topically over 12 hours daily Remove & Discard patch within 12 hours or as directed by MD   methocarbamol (ROBAXIN) 500 mg tablet   No No   Sig: Take 1 tablet (500 mg total) by mouth 2 (two) times a day   Patient not taking: Reported on 7/11/2023   methocarbamol (ROBAXIN) 500 mg tablet   No No   Sig: Take 1 tablet (500 mg total) by mouth 2 (two) times a day for 7 days   naproxen (NAPROSYN) 500 mg tablet   No No   Sig: Take 1 tablet (500 mg total) by mouth 2 (two) times a day with meals for 7 days      Facility-Administered Medications: None       Past Medical History:   Diagnosis Date    No known health problems        History reviewed. No pertinent surgical history.    History reviewed. No pertinent family history.  I have reviewed and agree with the history as  documented.    E-Cigarette/Vaping    E-Cigarette Use Never User      E-Cigarette/Vaping Substances     Social History     Tobacco Use    Smoking status: Never    Smokeless tobacco: Never   Vaping Use    Vaping status: Never Used   Substance Use Topics    Alcohol use: No    Drug use: No       Review of Systems   Constitutional:  Negative for chills, diaphoresis, fatigue and fever.   HENT:  Negative for congestion, ear pain and sore throat.    Eyes:  Negative for pain and visual disturbance.   Respiratory:  Negative for cough, shortness of breath, wheezing and stridor.    Cardiovascular:  Negative for chest pain and palpitations.   Gastrointestinal:  Positive for abdominal pain. Negative for blood in stool, constipation, diarrhea, nausea and vomiting.   Genitourinary:  Negative for dysuria, flank pain, frequency, hematuria, pelvic pain, urgency and vaginal discharge.   Musculoskeletal:  Negative for arthralgias and back pain.   Skin:  Negative for color change and rash.   Neurological:  Negative for seizures and syncope.   All other systems reviewed and are negative.      Physical Exam  Physical Exam  Vitals and nursing note reviewed.   Constitutional:       General: She is not in acute distress.     Appearance: She is well-developed. She is not toxic-appearing or diaphoretic.   HENT:      Head: Normocephalic and atraumatic.   Eyes:      Conjunctiva/sclera: Conjunctivae normal.   Cardiovascular:      Rate and Rhythm: Normal rate and regular rhythm.      Heart sounds: Normal heart sounds. No murmur heard.     No friction rub. No gallop.   Pulmonary:      Effort: Pulmonary effort is normal. No respiratory distress.      Breath sounds: Normal breath sounds. No stridor. No wheezing, rhonchi or rales.   Abdominal:      Palpations: Abdomen is soft.      Tenderness: There is abdominal tenderness in the left lower quadrant. There is no guarding or rebound. Negative signs include Adames's sign, Rovsing's sign and McBurney's  sign.      Hernia: No hernia is present.   Musculoskeletal:         General: No swelling.      Cervical back: Neck supple.   Skin:     General: Skin is warm.      Capillary Refill: Capillary refill takes less than 2 seconds.      Coloration: Skin is not cyanotic, jaundiced, mottled or pale.      Findings: No rash.   Neurological:      Mental Status: She is alert.   Psychiatric:         Mood and Affect: Mood normal.         Vital Signs  ED Triage Vitals [03/10/24 2219]   Temperature Pulse Respirations Blood Pressure SpO2   (!) 97.4 °F (36.3 °C) 77 16 130/67 97 %      Temp Source Heart Rate Source Patient Position - Orthostatic VS BP Location FiO2 (%)   Oral Monitor Sitting Right arm --      Pain Score       7           Vitals:    03/10/24 2219 03/11/24 0040 03/11/24 0351 03/11/24 0605   BP: 130/67 105/58 106/57 116/77   Pulse: 77 (!) 117 105 98   Patient Position - Orthostatic VS: Sitting Lying Lying Lying         Visual Acuity      ED Medications  Medications   lactated ringers infusion (125 mL/hr Intravenous New Bag 3/11/24 0629)   cefTRIAXone (ROCEPHIN) IVPB (premix in dextrose) 1,000 mg 50 mL (1,000 mg Intravenous New Bag 3/11/24 0629)   metroNIDAZOLE (FLAGYL) IVPB (premix) 500 mg 100 mL (has no administration in time range)   ketorolac (TORADOL) injection 15 mg (15 mg Intravenous Given 3/10/24 2328)   iohexol (OMNIPAQUE) 240 MG/ML solution 50 mL (50 mL Oral Given 3/11/24 0235)   iohexol (OMNIPAQUE) 350 MG/ML injection (MULTI-DOSE) 100 mL (100 mL Intravenous Given 3/11/24 0234)       Diagnostic Studies  Results Reviewed       Procedure Component Value Units Date/Time    Comprehensive metabolic panel [[id number]]  (Abnormal) Collected: 03/10/24 2330    Lab Status: Final result Specimen: Blood from Arm, Left Updated: 03/10/24 2351     Sodium 138 mmol/L      Potassium 3.7 mmol/L      Chloride 108 mmol/L      CO2 24 mmol/L      ANION GAP 6 mmol/L      BUN 11 mg/dL      Creatinine 0.53 mg/dL      Glucose 103 mg/dL       Calcium 9.3 mg/dL      AST 29 U/L      ALT 60 U/L      Alkaline Phosphatase 88 U/L      Total Protein 7.4 g/dL      Albumin 4.4 g/dL      Total Bilirubin 0.42 mg/dL      eGFR 129 ml/min/1.73sq m     Narrative:      National Kidney Disease Foundation guidelines for Chronic Kidney Disease (CKD):     Stage 1 with normal or high GFR (GFR > 90 mL/min/1.73 square meters)    Stage 2 Mild CKD (GFR = 60-89 mL/min/1.73 square meters)    Stage 3A Moderate CKD (GFR = 45-59 mL/min/1.73 square meters)    Stage 3B Moderate CKD (GFR = 30-44 mL/min/1.73 square meters)    Stage 4 Severe CKD (GFR = 15-29 mL/min/1.73 square meters)    Stage 5 End Stage CKD (GFR <15 mL/min/1.73 square meters)  Note: GFR calculation is accurate only with a steady state creatinine    CBC and differential [[id number]]  (Abnormal) Collected: 03/10/24 2330    Lab Status: Final result Specimen: Blood from Arm, Left Updated: 03/10/24 2341     WBC 7.40 Thousand/uL      RBC 3.67 Million/uL      Hemoglobin 11.5 g/dL      Hematocrit 33.6 %      MCV 92 fL      MCH 31.3 pg      MCHC 34.2 g/dL      RDW 12.1 %      MPV 10.6 fL      Platelets 276 Thousands/uL      nRBC 0 /100 WBCs      Neutrophils Relative 68 %      Immat GRANS % 0 %      Lymphocytes Relative 27 %      Monocytes Relative 4 %      Eosinophils Relative 1 %      Basophils Relative 0 %      Neutrophils Absolute 4.98 Thousands/µL      Immature Grans Absolute 0.02 Thousand/uL      Lymphocytes Absolute 2.01 Thousands/µL      Monocytes Absolute 0.30 Thousand/µL      Eosinophils Absolute 0.07 Thousand/µL      Basophils Absolute 0.02 Thousands/µL     UA w Reflex to Microscopic w Reflex to Culture [[id number]]  (Abnormal) Collected: 03/10/24 2330    Lab Status: Final result Specimen: Urine, Clean Catch Updated: 03/10/24 2339     Color, UA Light Yellow     Clarity, UA Clear     Specific Gravity, UA 1.019     pH, UA 6.5     Leukocytes, UA Negative     Nitrite, UA Negative     Protein, UA Negative mg/dl      Glucose,  UA Negative mg/dl      Ketones, UA Negative mg/dl      Urobilinogen, UA 2.0 mg/dl      Bilirubin, UA Negative     Occult Blood, UA Negative    POCT pregnancy, urine [[id number]]  (Normal) Resulted: 03/10/24 2328    Lab Status: Final result Updated: 03/10/24 2328     EXT Preg Test, Ur Negative     Control Valid                   CT abdomen pelvis with contrast   Final Result by Justin Hernandez MD (03/11 0444)      1.  4.6 cm hypodense structure in the left adnexa could represent ovarian cyst, consider pelvic ultrasound for better evaluation and to exclude possibility of torsion.   2.  Abnormal findings of the appendix as detailed above. Cannot exclude early appendicitis in the appropriate clinical setting. Recommend clinical correlation.   3.  Minimal nonspecific pericholecystic stranding noted, consider right upper quadrant ultrasound.   4.  No bowel obstruction, obstructive uropathy, free air, or free fluid.      5.  Findings discussed with JM Davis         Workstation performed: [id number]         US pelvis complete non OB    (Results Pending)              Procedures  Procedures         ED Course                               SBIRT 22yo+      Flowsheet Row Most Recent Value   Initial Alcohol Screen: US AUDIT-C     1. How often do you have a drink containing alcohol? 0 Filed at: 03/10/2024 2219   2. How many drinks containing alcohol do you have on a typical day you are drinking?  0 Filed at: 03/10/2024 2219   3a. Male UNDER 65: How often do you have five or more drinks on one occasion? 0 Filed at: 03/10/2024 2219   3b. FEMALE Any Age, or MALE 65+: How often do you have 4 or more drinks on one occassion? 0 Filed at: 03/10/2024 2219   Audit-C Score 0 Filed at: 03/10/2024 2219   ALISIA: How many times in the past year have you...    Used an illegal drug or used a prescription medication for non-medical reasons? Never Filed at: 03/10/2024 2219                      Medical Decision Making  28-year-old female presents to ED  "for evaluation of abdominal pain as above.  On physical examination patient is normotensive, nontachycardic, afebrile, without respiratory distress.  Alert and responding to questions appropriately.  Nontoxic-appearing.  No murmur.  Normal breath sounds.  No rash.  Tender to palpation of left lower quadrant of abdomen.  Previously has had left lower quadrant abdominal pain workup without any evidence of acute abdomen.  Given persistence of symptoms, recent increase in symptoms: obtained workup consisting of UA, urine pregnancy, CBC, CMP, CT abdomen pelvis with IV and oral contrast.  Toradol for pain control.  Differential diagnosis includes but not limited to UTI, urinary stone, pregnancy, ectopic pregnancy, diverticulitis, ovarian cyst, ovarian torsion, appendicitis, viral GI infection    Negative urine pregnancy test.  UA without evidence of  urinary tract infection, urinary stone.  CBC without leukocytosis.  CMP without concerning values.  CT abdomen pelvis with IV and oral contrast returned demonstrating the following:  \"1.  4.6 cm hypodense structure in the left adnexa could represent ovarian cyst, consider pelvic ultrasound for better evaluation and to exclude possibility of torsion.  2.  Abnormal findings of the appendix as detailed above. Cannot exclude early appendicitis in the appropriate clinical setting. Recommend clinical correlation.  3.  Minimal nonspecific pericholecystic stranding noted, consider right upper quadrant ultrasound.  4.  No bowel obstruction, obstructive uropathy, free air, or free fluid.\"    Discussed findings from CT with general surgery on-call resident Dr. Deniz Schilling who also evaluated patient.  Dr. Garcia discussed patient with his attending.  Plan to obtain left lower quadrant ultrasound to better characterize possible ovarian cyst seen on CT.  Plan depending on results of ultrasound.     Patient signed out to Deborah Gómez PA-C.    Amount and/or Complexity of Data " Reviewed  Labs: ordered.  Radiology: ordered.    Risk  Prescription drug management.             Disposition  Final diagnoses:   Acute appendicitis, unspecified acute appendicitis type     Time reflects when diagnosis was documented in both MDM as applicable and the Disposition within this note       Time User Action Codes Description Comment    3/11/2024  6:07 AM Deniz Schilling Add [K35.80] Acute appendicitis, unspecified acute appendicitis type           ED Disposition       None          Follow-up Information    None         Patient's Medications   Discharge Prescriptions    No medications on file       No discharge procedures on file.    PDMP Review       None            ED Provider  Electronically Signed by             Ryan Smith PA-C  03/11/24 0631

## 2024-03-11 NOTE — ASSESSMENT & PLAN NOTE
Pelvic U/S IMPRESSION: Left ovarian simple cyst measuring 4.6 cm. No evidence of torsion. Based on the ACR O-RADS system, this is O-RADS category 2 (almost certainly benign with <1% risk of malignancy).     Discussed that surgical intervention of her cyst is not indicated at this time.  Plan for follow up with her primary GYN. [facility] office information provided as well.

## 2024-03-11 NOTE — CASE MANAGEMENT
Case Management ED Readmission Risk Note    Name Gi Monroe  Pref. name Gi  Age 28 y.o.  Sex female MRN [id number]   1995  Problem Simple ovarian cyst  Location ED-15/ED-[facility]  Class Inpatient Admit date 3/10/2024  Date 3/11/2024        SUBJECTIVE    Gi presents complaining of Abdominal Pain (Pt reports LLQ abd pain that started 3 months ago.  Pain was intermittent but has become more constant)   Patient is identified as high risk for readmission based on their predictive model readmission score. [facility] has completed a comprehensive review of patient's chart to identify factors driving utilization with the objective of implementing tailored interventions to reduce risk of readmission.    OBJECTIVE    Predictive Model Details          46%  Factor Value    Risk of Hospital Admission or ED Visit Model 68% Number of ED Visits 3     18% Is in Relationship No     12% Has Anemia Yes     2% Has PCP Yes            Patient Active Problem List    Diagnosis Date Noted    Simple ovarian cyst 2024      Social History     Socioeconomic History    Marital status: Single     Spouse name: None    Number of children: None    Years of education: None    Highest education level: None   Occupational History    None   Tobacco Use    Smoking status: Never    Smokeless tobacco: Never   Vaping Use    Vaping status: Never Used   Substance and Sexual Activity    Alcohol use: No    Drug use: No    Sexual activity: None   Other Topics Concern    None   Social History Narrative    None     Social Determinants of Health     Financial Resource Strain: Low Risk  (2023)    Received from [facility]    Overall Financial Resource Strain (CARDIA)     Difficulty of Paying Living Expenses: Not very hard   Food Insecurity: No Food Insecurity (2023)    Received from [facility]    Hunger Vital Sign     Worried About Running Out of Food in the Last Year: Never true     Ran Out of Food in  the Last Year: Never true   Transportation Needs: No Transportation Needs (2/13/2023)    Received from [facility]    PRAPARE - Transportation     Lack of Transportation (Medical): No     Lack of Transportation (Non-Medical): No   Physical Activity: Not on file   Stress: Not on file   Social Connections: Not on file   Intimate Partner Violence: Not At Risk (2/13/2023)    Received from [facility]    Humiliation, Afraid, Rape, and Kick questionnaire     Fear of Current or Ex-Partner: No     Emotionally Abused: No     Physically Abused: No     Sexually Abused: No   Housing Stability: Low Risk  (2/13/2023)    Received from [facility]    Housing Stability Vital Sign     Unable to Pay for Housing in the Last Year: No     Number of Places Lived in the Last Year: 1     Unstable Housing in the Last Year: No        Primary care provider: [facility]  Primary Insurance: [employer]  Secondary Insurance:     INTERVENTIONS AND DISPOSITION    Medical and psychosocial drivers contributing to patient's current admission: Medical acuity  Overall medical and psychosocial drivers: Medical acuity    Patient admitted within the last 30 days: Yes   Patient was seen by case management during last admission: No      Protective factors: Support networks, Insured, and Access to outpatient/community resources  The following interventions were implemented: None  Disposition: Inpatient admission  Readmission is unpreventable from a case management standpoint due to the following reasons: Medical acuity requiring hospitalization    SUMMARY  Unable to refer to [facility] given pts PCP is out of network with [facility]

## 2024-03-11 NOTE — CASE MANAGEMENT
Code44, notification of change of admission status, discussed with patient. Patient understands change from IP to obs status may impact patients copay/deductible. Form signed and placed in scan bin.

## 2024-05-10 ENCOUNTER — HOSPITAL ENCOUNTER (EMERGENCY)
Facility: HOSPITAL | Age: 29
Discharge: HOME | End: 2024-05-11
Attending: EMERGENCY MEDICINE | Admitting: EMERGENCY MEDICINE

## 2024-05-10 DIAGNOSIS — R10.9 BILATERAL FLANK PAIN: Primary | ICD-10-CM

## 2024-05-10 DIAGNOSIS — R93.5 ABNORMAL CT OF THE ABDOMEN: ICD-10-CM

## 2024-05-10 PROCEDURE — 99284 EMERGENCY DEPT VISIT MOD MDM: CPT

## 2024-05-11 ENCOUNTER — APPOINTMENT (EMERGENCY)
Dept: CT IMAGING | Facility: HOSPITAL | Age: 29
End: 2024-05-11

## 2024-05-11 VITALS
DIASTOLIC BLOOD PRESSURE: 71 MMHG | TEMPERATURE: 97.7 F | OXYGEN SATURATION: 99 % | WEIGHT: 180.34 LBS | RESPIRATION RATE: 18 BRPM | SYSTOLIC BLOOD PRESSURE: 126 MMHG | HEART RATE: 117 BPM

## 2024-05-11 LAB
ALBUMIN SERPL BCP-MCNC: 4.9 G/DL (ref 3.5–5)
ALP SERPL-CCNC: 95 U/L (ref 34–104)
ALT SERPL W P-5'-P-CCNC: 50 U/L (ref 7–52)
ANION GAP SERPL CALCULATED.3IONS-SCNC: 9 MMOL/L (ref 4–13)
AST SERPL W P-5'-P-CCNC: 65 U/L (ref 13–39)
BACTERIA UR QL AUTO: ABNORMAL /HPF
BASOPHILS # BLD AUTO: 0.05 THOUSANDS/ÂΜL (ref 0–0.1)
BASOPHILS NFR BLD AUTO: 0 % (ref 0–1)
BILIRUB SERPL-MCNC: 0.5 MG/DL (ref 0.2–1)
BILIRUB UR QL STRIP: NEGATIVE
BUN SERPL-MCNC: 11 MG/DL (ref 5–25)
CALCIUM SERPL-MCNC: 9.8 MG/DL (ref 8.4–10.2)
CARDIAC TROPONIN I PNL SERPL HS: <2 NG/L
CHLORIDE SERPL-SCNC: 101 MMOL/L (ref 96–108)
CLARITY UR: CLEAR
CO2 SERPL-SCNC: 28 MMOL/L (ref 21–32)
COLOR UR: YELLOW
CREAT SERPL-MCNC: 0.63 MG/DL (ref 0.6–1.3)
EOSINOPHIL # BLD AUTO: 0.07 THOUSAND/ÂΜL (ref 0–0.61)
EOSINOPHIL NFR BLD AUTO: 1 % (ref 0–6)
ERYTHROCYTE [DISTWIDTH] IN BLOOD BY AUTOMATED COUNT: 11.9 % (ref 11.6–15.1)
EXT PREGNANCY TEST URINE: NEGATIVE
EXT. CONTROL: NORMAL
GFR SERPL CREATININE-BSD FRML MDRD: 122 ML/MIN/1.73SQ M
GLUCOSE SERPL-MCNC: 120 MG/DL (ref 65–140)
GLUCOSE UR STRIP-MCNC: NEGATIVE MG/DL
HCT VFR BLD AUTO: 37.5 % (ref 34.8–46.1)
HGB BLD-MCNC: 13.2 G/DL (ref 11.5–15.4)
HGB UR QL STRIP.AUTO: 250
IMM GRANULOCYTES # BLD AUTO: 0.04 THOUSAND/UL (ref 0–0.2)
IMM GRANULOCYTES NFR BLD AUTO: 0 % (ref 0–2)
KETONES UR STRIP-MCNC: NEGATIVE MG/DL
LEUKOCYTE ESTERASE UR QL STRIP: 25
LIPASE SERPL-CCNC: 23 U/L (ref 11–82)
LYMPHOCYTES # BLD AUTO: 2.94 THOUSANDS/ÂΜL (ref 0.6–4.47)
LYMPHOCYTES NFR BLD AUTO: 23 % (ref 14–44)
MCH RBC QN AUTO: 32.6 PG (ref 26.8–34.3)
MCHC RBC AUTO-ENTMCNC: 35.2 G/DL (ref 31.4–37.4)
MCV RBC AUTO: 93 FL (ref 82–98)
MONOCYTES # BLD AUTO: 0.49 THOUSAND/ÂΜL (ref 0.17–1.22)
MONOCYTES NFR BLD AUTO: 4 % (ref 4–12)
NEUTROPHILS # BLD AUTO: 9.03 THOUSANDS/ÂΜL (ref 1.85–7.62)
NEUTS SEG NFR BLD AUTO: 72 % (ref 43–75)
NITRITE UR QL STRIP: NEGATIVE
NON-SQ EPI CELLS URNS QL MICRO: ABNORMAL /HPF
NRBC BLD AUTO-RTO: 0 /100 WBCS
PH UR STRIP.AUTO: 6 [PH]
PLATELET # BLD AUTO: 259 THOUSANDS/UL (ref 149–390)
PMV BLD AUTO: 10.7 FL (ref 8.9–12.7)
POTASSIUM SERPL-SCNC: 3.2 MMOL/L (ref 3.5–5.3)
PROT SERPL-MCNC: 8.3 G/DL (ref 6.4–8.4)
PROT UR STRIP-MCNC: NEGATIVE MG/DL
RBC # BLD AUTO: 4.05 MILLION/UL (ref 3.81–5.12)
RBC #/AREA URNS AUTO: ABNORMAL /HPF
SODIUM SERPL-SCNC: 138 MMOL/L (ref 135–147)
SP GR UR STRIP.AUTO: 1.01 (ref 1–1.04)
UROBILINOGEN UA: NEGATIVE MG/DL
WBC # BLD AUTO: 12.62 THOUSAND/UL (ref 4.31–10.16)
WBC #/AREA URNS AUTO: ABNORMAL /HPF

## 2024-05-11 PROCEDURE — 81025 URINE PREGNANCY TEST: CPT

## 2024-05-11 PROCEDURE — 85025 COMPLETE CBC W/AUTO DIFF WBC: CPT

## 2024-05-11 PROCEDURE — 36415 COLL VENOUS BLD VENIPUNCTURE: CPT

## 2024-05-11 PROCEDURE — 80053 COMPREHEN METABOLIC PANEL: CPT

## 2024-05-11 PROCEDURE — 96374 THER/PROPH/DIAG INJ IV PUSH: CPT

## 2024-05-11 PROCEDURE — 81003 URINALYSIS AUTO W/O SCOPE: CPT

## 2024-05-11 PROCEDURE — 84484 ASSAY OF TROPONIN QUANT: CPT

## 2024-05-11 PROCEDURE — 96375 TX/PRO/DX INJ NEW DRUG ADDON: CPT

## 2024-05-11 PROCEDURE — 83690 ASSAY OF LIPASE: CPT

## 2024-05-11 PROCEDURE — 96361 HYDRATE IV INFUSION ADD-ON: CPT

## 2024-05-11 PROCEDURE — 99285 EMERGENCY DEPT VISIT HI MDM: CPT

## 2024-05-11 PROCEDURE — 74177 CT ABD & PELVIS W/CONTRAST: CPT

## 2024-05-11 PROCEDURE — 93005 ELECTROCARDIOGRAM TRACING: CPT

## 2024-05-11 PROCEDURE — 81001 URINALYSIS AUTO W/SCOPE: CPT

## 2024-05-11 RX ORDER — MORPHINE SULFATE 4 MG/ML
4 INJECTION, SOLUTION INTRAMUSCULAR; INTRAVENOUS ONCE
Status: COMPLETED | OUTPATIENT
Start: 2024-05-11 | End: 2024-05-11

## 2024-05-11 RX ORDER — POTASSIUM CHLORIDE 20 MEQ/1
40 TABLET, EXTENDED RELEASE ORAL ONCE
Status: COMPLETED | OUTPATIENT
Start: 2024-05-11 | End: 2024-05-11

## 2024-05-11 RX ORDER — KETOROLAC TROMETHAMINE 30 MG/ML
15 INJECTION, SOLUTION INTRAMUSCULAR; INTRAVENOUS ONCE
Status: COMPLETED | OUTPATIENT
Start: 2024-05-11 | End: 2024-05-11

## 2024-05-11 RX ORDER — NAPROXEN 500 MG/1
500 TABLET ORAL 2 TIMES DAILY PRN
Qty: 30 TABLET | Refills: 0 | Status: SHIPPED | OUTPATIENT
Start: 2024-05-11

## 2024-05-11 RX ADMIN — KETOROLAC TROMETHAMINE 15 MG: 30 INJECTION, SOLUTION INTRAMUSCULAR; INTRAVENOUS at 00:39

## 2024-05-11 RX ADMIN — POTASSIUM CHLORIDE 40 MEQ: 1500 TABLET, EXTENDED RELEASE ORAL at 04:55

## 2024-05-11 RX ADMIN — MORPHINE SULFATE 4 MG: 4 INJECTION, SOLUTION INTRAMUSCULAR; INTRAVENOUS at 04:44

## 2024-05-11 RX ADMIN — IOHEXOL 100 ML: 350 INJECTION, SOLUTION INTRAVENOUS at 01:20

## 2024-05-11 RX ADMIN — SODIUM CHLORIDE 1000 ML: 0.9 INJECTION, SOLUTION INTRAVENOUS at 00:40

## 2024-05-11 NOTE — ED PROVIDER NOTES
"History  Chief Complaint   Patient presents with    Back Pain     \"Severe\" rt upper back pain x 20 min.  No event of injury.  Spontaneous onset while laying on couch.       The patient is a 28-year-old female with no pertinent past medical history, who presents for evaluation of back pain.  She reports a sudden onset of right upper back pain radiating into the left back, abdomen, and chest approximately 20 minutes PTA while lying on the couch.  The pain is described as a \"burning sensation\".  No associated shortness of breath, but the pain is exacerbated with deep breathing.  No nausea, vomiting, diarrhea, dysuria, urinary frequency, hematuria, URI symptoms, or fevers.  She reports having similar symptoms in the past, but no etiology of her symptoms were found.    Per chart review the patient was seen in the ED at [facility] for a very similar complaint on 11/6/23.  At that time she was diagnosed with a UTI.  She was then evaluated for LLQ in the [facility] ED on 3/11/24.  At that time the CT was concerning for a possible appendicitis.  Patient was consented for an appendectomy, but the procedure was not performed at that time.        Prior to Admission Medications   Prescriptions Last Dose Informant Patient Reported? Taking?   acetaminophen (TYLENOL) 325 mg tablet   No No   Sig: Take 3 tablets (975 mg total) by mouth every 8 (eight) hours   etonogestrel (Nexplanon) subdermal implant   Yes No   Sig: Inject 1 each under the skin      Facility-Administered Medications: None       Past Medical History:   Diagnosis Date    No known health problems        History reviewed. No pertinent surgical history.    History reviewed. No pertinent family history.  I have reviewed and agree with the history as documented.    E-Cigarette/Vaping    E-Cigarette Use Never User      E-Cigarette/Vaping Substances     Social History     Tobacco Use    Smoking status: Never    Smokeless tobacco: Never   Vaping Use    Vaping status: Never Used "   Substance Use Topics    Alcohol use: No    Drug use: No       Review of Systems   Constitutional:  Negative for chills and fever.   HENT:  Negative for congestion, ear pain, rhinorrhea and sore throat.    Eyes:  Negative for pain and visual disturbance.   Respiratory:  Negative for cough and shortness of breath.    Cardiovascular:  Negative for chest pain and palpitations.   Gastrointestinal:  Positive for abdominal pain. Negative for constipation, diarrhea, nausea and vomiting.   Genitourinary:  Positive for flank pain. Negative for dysuria and hematuria.   Musculoskeletal:  Positive for back pain. Negative for arthralgias, myalgias, neck pain and neck stiffness.   Skin:  Negative for color change and rash.   Neurological:  Negative for seizures, syncope, light-headedness and headaches.   All other systems reviewed and are negative.      Physical Exam  Physical Exam  Vitals and nursing note reviewed.   Constitutional:       General: She is awake. She is not in acute distress.     Appearance: She is well-developed and overweight. She is not toxic-appearing or diaphoretic.   HENT:      Head: Normocephalic and atraumatic.      Right Ear: External ear normal.      Left Ear: External ear normal.      Nose: Nose normal.      Mouth/Throat:      Lips: Pink.      Mouth: Mucous membranes are moist.      Pharynx: Oropharynx is clear. Uvula midline.   Eyes:      General: Lids are normal. Vision grossly intact. Gaze aligned appropriately. No scleral icterus.     Conjunctiva/sclera: Conjunctivae normal.      Pupils: Pupils are equal, round, and reactive to light.   Cardiovascular:      Rate and Rhythm: Normal rate and regular rhythm.      Heart sounds: S1 normal and S2 normal. No murmur heard.     No friction rub. No gallop.   Pulmonary:      Effort: Pulmonary effort is normal. No respiratory distress.      Breath sounds: Normal breath sounds and air entry. No wheezing, rhonchi or rales.   Chest:      Chest wall: No deformity,  swelling, tenderness, crepitus or edema.   Abdominal:      General: Abdomen is flat. Bowel sounds are normal. There is no distension.      Palpations: Abdomen is soft. There is no mass.      Tenderness: There is abdominal tenderness in the right upper quadrant and left upper quadrant. There is right CVA tenderness and left CVA tenderness. There is no guarding or rebound.      Hernia: No hernia is present.   Musculoskeletal:      Cervical back: Normal, full passive range of motion without pain and neck supple. No rigidity or crepitus. No spinous process tenderness or muscular tenderness.      Thoracic back: Tenderness present. No spasms or bony tenderness.      Lumbar back: Normal. No spasms, tenderness or bony tenderness.        Back:       Comments: Tenderness to palpation of bilateral flanks, worse on the right.   Lymphadenopathy:      Cervical: No cervical adenopathy.   Skin:     General: Skin is warm.      Capillary Refill: Capillary refill takes less than 2 seconds.      Coloration: Skin is not pale.      Findings: No rash.   Neurological:      Mental Status: She is alert and oriented to person, place, and time.   Psychiatric:         Attention and Perception: Attention normal.         Speech: Speech normal.         Behavior: Behavior is cooperative.         Vital Signs  ED Triage Vitals   Temperature Pulse Respirations Blood Pressure SpO2   05/10/24 2357 05/10/24 2357 05/10/24 2357 05/10/24 2357 05/10/24 2357   97.7 °F (36.5 °C) 77 18 134/83 100 %      Temp Source Heart Rate Source Patient Position - Orthostatic VS BP Location FiO2 (%)   05/10/24 2357 05/10/24 2357 05/10/24 2357 05/10/24 2357 --   Tympanic Monitor Lying Left arm       Pain Score       05/11/24 0039       9           Vitals:    05/10/24 2357 05/11/24 0438   BP: 134/83 126/71   Pulse: 77 (!) 117   Patient Position - Orthostatic VS: Lying Lying         ED Medications  Medications   sodium chloride 0.9 % bolus 1,000 mL (0 mL Intravenous Stopped  5/11/24 0140)   ketorolac (TORADOL) injection 15 mg (15 mg Intravenous Given 5/11/24 0039)   iohexol (OMNIPAQUE) 350 MG/ML injection (MULTI-DOSE) 100 mL (100 mL Intravenous Given 5/11/24 0120)   morphine injection 4 mg (4 mg Intravenous Given 5/11/24 0444)   potassium chloride (Klor-Con M20) CR tablet 40 mEq (40 mEq Oral Given 5/11/24 0455)       Diagnostic Studies  Results Reviewed       Procedure Component Value Units Date/Time    HS Troponin 0hr (reflex protocol) [[id number]]  (Normal) Collected: 05/11/24 0039    Lab Status: Final result Specimen: Blood from Arm, Right Updated: 05/11/24 0109     hs TnI 0hr <2 ng/L     Urine Microscopic [[id number]]  (Abnormal) Collected: 05/11/24 0035    Lab Status: Final result Specimen: Urine, Other Updated: 05/11/24 0106     RBC, UA 2-4 /hpf      WBC, UA 1-2 /hpf      Epithelial Cells Occasional /hpf      Bacteria, UA Moderate /hpf     Comprehensive metabolic panel [[id number]]  (Abnormal) Collected: 05/11/24 0039    Lab Status: Final result Specimen: Blood from Arm, Right Updated: 05/11/24 0105     Sodium 138 mmol/L      Potassium 3.2 mmol/L      Chloride 101 mmol/L      CO2 28 mmol/L      ANION GAP 9 mmol/L      BUN 11 mg/dL      Creatinine 0.63 mg/dL      Glucose 120 mg/dL      Calcium 9.8 mg/dL      AST 65 U/L      ALT 50 U/L      Alkaline Phosphatase 95 U/L      Total Protein 8.3 g/dL      Albumin 4.9 g/dL      Total Bilirubin 0.50 mg/dL      eGFR 122 ml/min/1.73sq m     Narrative:      National Kidney Disease Foundation guidelines for Chronic Kidney Disease (CKD):     Stage 1 with normal or high GFR (GFR > 90 mL/min/1.73 square meters)    Stage 2 Mild CKD (GFR = 60-89 mL/min/1.73 square meters)    Stage 3A Moderate CKD (GFR = 45-59 mL/min/1.73 square meters)    Stage 3B Moderate CKD (GFR = 30-44 mL/min/1.73 square meters)    Stage 4 Severe CKD (GFR = 15-29 mL/min/1.73 square meters)    Stage 5 End Stage CKD (GFR <15 mL/min/1.73 square meters)  Note: GFR calculation is  accurate only with a steady state creatinine    Lipase [[id number]]  (Normal) Collected: 05/11/24 0039    Lab Status: Final result Specimen: Blood from Arm, Right Updated: 05/11/24 0105     Lipase 23 u/L     UA w Reflex to Microscopic w Reflex to Culture [[id number]]  (Abnormal) Collected: 05/11/24 0035    Lab Status: Final result Specimen: Urine, Other Updated: 05/11/24 0053     Color, UA Yellow     Clarity, UA Clear     Specific Gravity, UA 1.015     pH, UA 6.0     Leukocytes, UA 25.0     Nitrite, UA Negative     Protein, UA Negative mg/dl      Glucose, UA Negative mg/dl      Ketones, UA Negative mg/dl      Bilirubin, UA Negative     Occult Blood, .0     UROBILINOGEN UA Negative mg/dL     CBC and differential [[id number]]  (Abnormal) Collected: 05/11/24 0039    Lab Status: Final result Specimen: Blood from Arm, Right Updated: 05/11/24 0047     WBC 12.62 Thousand/uL      RBC 4.05 Million/uL      Hemoglobin 13.2 g/dL      Hematocrit 37.5 %      MCV 93 fL      MCH 32.6 pg      MCHC 35.2 g/dL      RDW 11.9 %      MPV 10.7 fL      Platelets 259 Thousands/uL      nRBC 0 /100 WBCs      Segmented % 72 %      Immature Grans % 0 %      Lymphocytes % 23 %      Monocytes % 4 %      Eosinophils Relative 1 %      Basophils Relative 0 %      Absolute Neutrophils 9.03 Thousands/µL      Absolute Immature Grans 0.04 Thousand/uL      Absolute Lymphocytes 2.94 Thousands/µL      Absolute Monocytes 0.49 Thousand/µL      Eosinophils Absolute 0.07 Thousand/µL      Basophils Absolute 0.05 Thousands/µL     POCT pregnancy, urine [[id number]]  (Normal) Resulted: 05/11/24 0037    Lab Status: Final result Updated: 05/11/24 0037     EXT Preg Test, Ur Negative     Control Valid                   CT abdomen pelvis with contrast   Final Result by Justin Hernandez MD (05/11 0428)      1.  Similar. Minimal pericholecystic edema. Consider right upper quadrant for ultrasound for better evaluation of the gallbladder.   2.  Findings suspicious for early  appendicitis again noted as detailed above. Findings appear similar compared to the prior study. No pericecal free air or abscess/fluid collection.   3.  No evidence for bowel obstruction, obstructive uropathy, free air, free fluid, or loculated fluid collections in the abdomen/pelvis.   4.  2.4 cm ovoid hypodensity in the right adnexa could represent small ovarian cyst or dominant follicle/corpus luteum.            Workstation performed: [id number]                    Procedures  ECG 12 Lead Documentation Only    Date/Time: 5/11/2024 12:49 AM    Performed by: Mya Ashby PA-C  Authorized by: yMa Ashby PA-C    ECG reviewed by me, the ED Provider: yes    Patient location:  ED  Interpretation:     Interpretation: normal    Rate:     ECG rate:  87    ECG rate assessment: normal    Rhythm:     Rhythm comment:  Sinus rhythm with sinus arrhythmia  Ectopy:     Ectopy: none    QRS:     QRS axis:  Normal    QRS intervals:  Normal  Conduction:     Conduction: normal    ST segments:     ST segments:  Normal  T waves:     T waves: normal    Comments:      NE interval: 148ms  QRS duration: 90s  QT/QTc: 384/462ms         ED Course  ED Course as of 05/11/24 0513   Sat May 11, 2024   0103 WBC(!): 12.62   0104 Hemoglobin: 13.2   0104 Platelet Count: 259   0107 LIPASE: 23   0141 Patient is lying comfortably on the stretcher texting at this time.  She reports some improvement in her pain.  Reviewed lab, UA, and EKG results.  CT is pending.   0430 CT abdomen pelvis with contrast  IMPRESSION:  1.  Similar. Minimal pericholecystic edema. Consider right upper quadrant for ultrasound for better evaluation of the gallbladder.  2.  Findings suspicious for early appendicitis again noted as detailed above. Findings appear similar compared to the prior study. No pericecal free air or abscess/fluid collection.  3.  No evidence for bowel obstruction, obstructive uropathy, free air, free fluid, or loculated fluid collections in the  abdomen/pelvis.  4.  2.4 cm ovoid hypodensity in the right adnexa could represent small ovarian cyst or dominant follicle/corpus luteum.   0446 Spoke with on-call general surgeon Dr. Dutta regarding concern for early appendicitis and pericholecystic edema on CT.  As these results are similar to her most recent CT scan on 3/11/24, Dr. Dutta recommends discharge with outpatient follow-up.         HEART Risk Score      Flowsheet Row Most Recent Value   Heart Score Risk Calculator    History 0 Filed at: 05/11/2024 0127   ECG 0 Filed at: 05/11/2024 0127   Age 0 Filed at: 05/11/2024 0127   Risk Factors 0 Filed at: 05/11/2024 0127   Troponin 0 Filed at: 05/11/2024 0127   HEART Score 0 Filed at: 05/11/2024 0127            PERC Rule for PE      Flowsheet Row Most Recent Value   PERC Rule for PE    Age >=50 0 Filed at: 05/11/2024 0119   HR >=100 0 Filed at: 05/11/2024 0119   O2 Sat on room air < 95% 0 Filed at: 05/11/2024 0119   History of PE or DVT 0 Filed at: 05/11/2024 0119   Recent trauma or surgery 0 Filed at: 05/11/2024 0119   Hemoptysis 0 Filed at: 05/11/2024 0119   Exogenous estrogen 0 Filed at: 05/11/2024 0119   Unilateral leg swelling 0 Filed at: 05/11/2024 0119   PERC Rule for PE Results 0 Filed at: 05/11/2024 0119            SBIRT 20yo+      Flowsheet Row Most Recent Value   Initial Alcohol Screen: US AUDIT-C     1. How often do you have a drink containing alcohol? 0 Filed at: 05/11/2024 0001   2. How many drinks containing alcohol do you have on a typical day you are drinking?  0 Filed at: 05/11/2024 0001   3b. FEMALE Any Age, or MALE 65+: How often do you have 4 or more drinks on one occassion? 0 Filed at: 05/11/2024 0001   Audit-C Score 0 Filed at: 05/11/2024 0001   ALISIA: How many times in the past year have you...    Used an illegal drug or used a prescription medication for non-medical reasons? Never Filed at: 05/11/2024 0001              Medical Decision Making  Patient presents for bilateral  flank pain radiating into the chest and abdomen.  She is afebrile and nontoxic-appearing.  Broad differential diagnosis includes but is not limited to biliary colic, cholecystitis, choledocholithiasis, pancreatitis, hepatitis, gastritis, gastroenteritis, GERD, PUD, renal colic, UTI, pyelonephritis, costochondritis, chest wall pain, ACS, PE, or musculoskeletal back pain.  On my personal interpretation of the EKG the patient is in a sinus rhythm without acute ischemic changes.  With a normal EKG and negative troponin, doubt cardiac etiology.  Patient is PERC negative, therefore further evaluation for a pulmonary embolism was not pursued.  No significant abnormalities on labs or urinalysis.  CT of the abdomen and pelvis revealed mild inflammatory changes of the appendix and gallbladder, that appeared similar to prior CT two months ago.  Spoke with the on-call general surgeon Dr. Dutta, who recommended discharge with outpatient follow-up.  Reviewed all results and recommendations from general surgery with patient.  All questions were answered to the best my ability and patient is in agreement with the treatment plan.  Strict return precautions discussed and she verbalized understanding.  Follow-up with PCP and general surgery, but return to the ED in the interim with new or worsening symptoms.    Problems Addressed:  Abnormal CT of the abdomen: acute illness or injury  Bilateral flank pain: acute illness or injury    Amount and/or Complexity of Data Reviewed  Labs: ordered. Decision-making details documented in ED Course.  Radiology: ordered. Decision-making details documented in ED Course.    Risk  Prescription drug management.             Disposition  Final diagnoses:   Bilateral flank pain   Abnormal CT of the abdomen     Time reflects when diagnosis was documented in both MDM as applicable and the Disposition within this note       Time User Action Codes Description Comment    5/11/2024  4:50 AM Mya Ashby  Add [R10.9] Bilateral flank pain     5/11/2024  4:50 AM Mya Ashby Add [R93.5] Abnormal CT of the abdomen           ED Disposition       ED Disposition   Discharge    Condition   Stable    Date/Time   Sat May 11, 2024 8400    Comment   Gi Monroe discharge to home/self care.                   Follow-up Information       Follow up With Specialties Details Why Contact Info Additional Information    [facility]   [address] [id number] [phone]       [address] [address] [phone] [address], [address] [phone]            Patient's Medications   Discharge Prescriptions    NAPROXEN (NAPROSYN) 500 MG TABLET    Take 1 tablet (500 mg total) by mouth 2 (two) times a day as needed for mild pain       Start Date: 5/11/2024 End Date: --       Order Dose: 500 mg       Quantity: 30 tablet    Refills: 0           PDMP Review         Value Time User    PDMP Reviewed  Yes 3/11/2024  1:41 PM Mala Thapa PA-C            ED Provider  Electronically Signed by             Mya Ashby PA-C  05/11/24 0514

## 2024-05-12 LAB
ATRIAL RATE: 87 BPM
P AXIS: 63 DEGREES
PR INTERVAL: 148 MS
QRS AXIS: 81 DEGREES
QRSD INTERVAL: 90 MS
QT INTERVAL: 384 MS
QTC INTERVAL: 462 MS
T WAVE AXIS: 55 DEGREES
VENTRICULAR RATE: 87 BPM

## 2024-05-12 PROCEDURE — 93010 ELECTROCARDIOGRAM REPORT: CPT

## 2024-06-11 NOTE — PROGRESS NOTES
Assessment/Plan:   Gi Monroe is a 29 y.o.female who is here for Gallbladder disease         Upon evaluation today patient has: acute cholecystitis on CT at last ER visit    .     Plan:   DIONICIO US to look for gallstones not seen on CT; edema in the gallbaldder on last CT. Possible need for elective cholecystectomy in the future. Follow up after studies to make surgical plan.  CT Abd and pelvis for last CT showed possible early appendicitis. Patient states minimal pain RLQ. Will get updated CT abd and pelvis per Dr. Ge to see if this has resolved on own. If not we will speak about lap appendectomy at her next appointment.   Reviewed symptoms to go to ER.Patient will also keep a dietary/pain log to see if there is association with food.    Post Op Pain Management: Norco      Ultrasound findings: getting US        Preoperative Clearance: None             In preparation for this visit all relevant and known prior office notes, prior consultations, emergency room visits, blood work results, and imaging studies were personally reviewed.  A total of  30 minutes was spent reviewing all of this information,caring for this patient, providing differential diagnosis, instructions for management, counseling and coordination of care.  This also includes planning surgical intervention where indicated.    Patient understands hernia occurrence or re-occurrence risk is higher in a diabetic, tobacco user, with elevated BMIs.     ____________________________________________________    HPI:  Gi Monroe is a 29 y.o.female who was referred for evaluation of The primary encounter diagnosis was Other appendicitis. Diagnoses of Bilateral flank pain and Abnormal CT of the abdomen were also pertinent to this visit.    Patient is following up from ER visit in March and May for abdominal pain and both CT showing Minimal pericholecystic edema. No stones. Findings suspicious for early appendicitis again noted as detailed above.  Findings appear similar compared to the prior study. No pericecal free air or abscess/fluid collection. March visit to ER was for LLQ pain and May was for bilateral upper back pain.    Today  Abdominal pain Location: in the RUQ and in the RLQ with radiation to back, which is Intermittent  and over 1 month     no nausea and no vomiting,     regular bowel movement.     Duration of pain or symptoms: Intermittent and over 3 months      Prior Colonoscopy : None     Prior Abdominal Surgery: none    Anticoagulation: none    Smoking Status: Non-smoker     Imaging:   US right upper quadrant    (Results Pending)   CT abdomen pelvis w contrast    (Results Pending)           LABS:    Lab Results   Component Value Date    K 3.2 (L) 05/11/2024     05/11/2024    CO2 28 05/11/2024    BUN 11 05/11/2024    CREATININE 0.63 05/11/2024    CALCIUM 9.8 05/11/2024    AST 65 (H) 05/11/2024    ALT 50 05/11/2024    ALKPHOS 95 05/11/2024    EGFR 122 05/11/2024       Lab Results   Component Value Date    WBC 12.62 (H) 05/11/2024    HGB 13.2 05/11/2024    HCT 37.5 05/11/2024    MCV 93 05/11/2024     05/11/2024     Lab Results   Component Value Date    INR 1.1 11/01/2020     Lab Results   Component Value Date    HGBA1C 5.4 01/11/2023           ROS:  General ROS: negative for - chills, fatigue, fever or night sweats, weight loss  Respiratory ROS: no cough, shortness of breath, or wheezing  Cardiovascular ROS: no chest pain or dyspnea on exertion  Genito-Urinary ROS: no dysuria, trouble voiding, or hematuria  Musculoskeletal ROS: negative for - gait disturbance, joint pain or muscle pain  Neurological ROS: no TIA or stroke symptoms  Gastrointestinal ROS: See HPI   GI ROS: see HPI  Skin ROS: no new rashes or lesions   Lymphatic ROS: no new adenopathy noted by pt.   GYN ROS: see HPI, no new GYN history or bleeding noted  Psy ROS: no new mental or behavioral disturbances       Patient Active Problem List   Diagnosis    Simple ovarian cyst          Allergies:  Patient has no known allergies.      Current Outpatient Medications:     acetaminophen (TYLENOL) 325 mg tablet, Take 3 tablets (975 mg total) by mouth every 8 (eight) hours, Disp: , Rfl:     etonogestrel (Nexplanon) subdermal implant, Inject 1 each under the skin, Disp: , Rfl:     naproxen (Naprosyn) 500 mg tablet, Take 1 tablet (500 mg total) by mouth 2 (two) times a day as needed for mild pain (Patient not taking: Reported on 6/17/2024), Disp: 30 tablet, Rfl: 0    Past Medical History:   Diagnosis Date    No known health problems        History reviewed. No pertinent surgical history.    Family History   Problem Relation Age of Onset    No Known Problems Mother     No Known Problems Father         reports that she has never smoked. She has never been exposed to tobacco smoke. She has never used smokeless tobacco. She reports that she does not drink alcohol and does not use drugs.      Exam:   Vitals:    06/17/24 1509   BP: 120/82   Pulse: 94   Resp: 16   Temp: 97.6 °F (36.4 °C)   SpO2: 97%       PHYSICAL EXAM  General Appearance:    Alert, cooperative, no distress,    Head:    Normocephalic without obvious abnormality   Eyes:    PERRL, conjunctiva/corneas clear,       Neck:   Supple, no adenopathy, no JVD   Back:     Symmetric, no spinal or CVA tenderness   Lungs:     Clear to auscultation bilaterally, no wheezing or rhonchi   Heart:    Regular rate and rhythm, S1 and S2 normal, no murmur   Abdomen:     mild tenderness in the in the RUQ and in the RLQ.      Extremities:   Extremities normal. No clubbing, cyanosis or edema   Psych:   Normal Affect, AOx3.    Neurologic:  Skin:   CNII-XII intact. Strength symmetric, speech intact    Warm, dry, intact, no visible rashes or lesions      Informed consent for procedure was personally discussed, reviewed, and signed by Dr. Ge. Discussion by Dr. Ge was carried out regarding risks, benefits, and alternatives with the patient. Risks include  "but are not limited to:  bleeding, infection, and delayed wound healing or an open wound, pulmonary embolus, leaks from bowel or bile ducts or other viscus, transfusions, death.  Discussed in further detail the more common complications and their rates of occurrence.   was used if necessary.  Patient expressed understanding of the issues discussed and wished/consented to proceed.  All questions were answered by Dr. Ge.    Discussion performed between patient and the provider signing below.     Signature:   Briseida Mcknight PA-C    Date: 6/17/2024 Time: 4:22 PM                          Some portions of this record may have been generated with voice recognition software. There may be translation, syntax,  or grammatical errors. Occasional wrong word or \"sound-a-like\" substitutions may have occurred due to the inherent limitations of the voice recognition software. Read the chart carefully and recognize, using context, where substitutions may have occurred. If you have any questions, please contact the dictating provider for clarification or correction, as needed. This encounter has been coded by a non-certified coder.   "

## 2024-06-17 ENCOUNTER — CONSULT (OUTPATIENT)
Dept: SURGERY | Facility: CLINIC | Age: 29
End: 2024-06-17
Payer: MEDICARE

## 2024-06-17 VITALS
DIASTOLIC BLOOD PRESSURE: 82 MMHG | BODY MASS INDEX: 29.49 KG/M2 | TEMPERATURE: 97.6 F | RESPIRATION RATE: 16 BRPM | WEIGHT: 177 LBS | HEIGHT: 65 IN | HEART RATE: 94 BPM | SYSTOLIC BLOOD PRESSURE: 120 MMHG | OXYGEN SATURATION: 97 %

## 2024-06-17 DIAGNOSIS — K36 OTHER APPENDICITIS: Primary | ICD-10-CM

## 2024-06-17 DIAGNOSIS — R93.5 ABNORMAL CT OF THE ABDOMEN: ICD-10-CM

## 2024-06-17 DIAGNOSIS — R10.9 BILATERAL FLANK PAIN: ICD-10-CM

## 2024-06-17 PROCEDURE — 99243 OFF/OP CNSLTJ NEW/EST LOW 30: CPT | Performed by: PHYSICIAN ASSISTANT

## 2024-06-24 ENCOUNTER — HOSPITAL ENCOUNTER (OUTPATIENT)
Dept: ULTRASOUND IMAGING | Facility: HOSPITAL | Age: 29
Discharge: HOME | End: 2024-06-24
Payer: MEDICARE

## 2024-06-24 DIAGNOSIS — R10.9 BILATERAL FLANK PAIN: ICD-10-CM

## 2024-06-24 PROCEDURE — 76705 ECHO EXAM OF ABDOMEN: CPT

## 2024-07-02 ENCOUNTER — HOSPITAL ENCOUNTER (OUTPATIENT)
Dept: CT IMAGING | Facility: HOSPITAL | Age: 29
Discharge: HOME | End: 2024-07-02
Payer: MEDICARE

## 2024-07-02 DIAGNOSIS — K36 OTHER APPENDICITIS: ICD-10-CM

## 2024-07-02 PROCEDURE — 74177 CT ABD & PELVIS W/CONTRAST: CPT

## 2024-07-02 RX ADMIN — IOHEXOL 100 ML: 350 INJECTION, SOLUTION INTRAVENOUS at 18:17

## 2024-07-09 ENCOUNTER — TELEPHONE (OUTPATIENT)
Dept: SURGERY | Facility: CLINIC | Age: 29
End: 2024-07-09

## 2024-07-09 NOTE — TELEPHONE ENCOUNTER
----- Message from Briseida Mcknight PA-C sent at 7/1/2024  8:05 AM EDT -----  Please let her know her US did show she has gallstones. WE will discuss surgery when she returns also after her CT.

## 2024-08-01 ENCOUNTER — OFFICE VISIT (OUTPATIENT)
Dept: SURGERY | Facility: CLINIC | Age: 29
End: 2024-08-01
Payer: MEDICARE

## 2024-08-01 VITALS
HEART RATE: 86 BPM | WEIGHT: 177 LBS | BODY MASS INDEX: 29.49 KG/M2 | RESPIRATION RATE: 16 BRPM | SYSTOLIC BLOOD PRESSURE: 100 MMHG | DIASTOLIC BLOOD PRESSURE: 68 MMHG | HEIGHT: 65 IN | OXYGEN SATURATION: 95 % | TEMPERATURE: 98.5 F

## 2024-08-01 DIAGNOSIS — K80.20 CALCULUS OF GALLBLADDER WITHOUT CHOLECYSTITIS WITHOUT OBSTRUCTION: Primary | ICD-10-CM

## 2024-08-01 DIAGNOSIS — K38.9 APPENDIX DISEASE: ICD-10-CM

## 2024-08-01 PROCEDURE — 99213 OFFICE O/P EST LOW 20 MIN: CPT | Performed by: PHYSICIAN ASSISTANT

## 2024-08-01 NOTE — PROGRESS NOTES
Assessment/Plan:   Gi Monroe is a 29 y.o.female who is here for Gallbladder disease         Upon evaluation today patient has: acute cholecystitis on CT at last ER visit    .     Plan:   DIONICIO US showing contracted gallbladder with cholelithiasis   CT Abd and pelvis for last CT showed APPENDIX: Fluid-filled appendix is identified without periappendiceal inflammatory change identified.   Schedule for robotic cholecystectomy with possible appendectomy     Post Op Pain Management: [facility]      Ultrasound findings: 6/24/24  Contracted gallbladder with cholelithiasis. No wall thickening or pericholecystic fluid.     CT abdomen and pelvis:  Mildly prominent fluid-filled appendix is identified without surrounding periappendiceal inflammatory change to suggest acute appendicitis.     Preoperative Clearance: None             In preparation for this visit all relevant and known prior office notes, prior consultations, emergency room visits, blood work results, and imaging studies were personally reviewed.  A total of  30 minutes was spent reviewing all of this information,caring for this patient, providing differential diagnosis, instructions for management, counseling and coordination of care.  This also includes planning surgical intervention where indicated.    Patient understands hernia occurrence or re-occurrence risk is higher in a diabetic, tobacco user, with elevated BMIs.     ____________________________________________________    HPI:  Gi Monroe is a 29 y.o.female who was referred for evaluation of The primary encounter diagnosis was Calculus of gallbladder without cholecystitis without obstruction. A diagnosis of Appendix disease was also pertinent to this visit.    Patient is following up from ER visit in March and May for abdominal pain and both CT showing Minimal pericholecystic edema. No stones. Findings suspicious for early appendicitis again noted as detailed above. Findings appear similar  compared to the prior study. No pericecal free air or abscess/fluid collection. March visit to ER was for LLQ pain and May was for bilateral upper back pain.    Today  Abdominal pain Location: in the RUQ and in the RLQ with radiation to back, which is Intermittent  and over 1 month     no nausea and no vomiting,     regular bowel movement.     Duration of pain or symptoms: Intermittent and over 3 months      Prior Colonoscopy : None     Prior Abdominal Surgery: none    Anticoagulation: none    Smoking Status: Non-smoker     Imaging:   No orders to display           LABS:    Lab Results   Component Value Date    K 3.2 (L) 05/11/2024     05/11/2024    CO2 28 05/11/2024    BUN 11 05/11/2024    CREATININE 0.63 05/11/2024    CALCIUM 9.8 05/11/2024    AST 65 (H) 05/11/2024    ALT 50 05/11/2024    ALKPHOS 95 05/11/2024    EGFR 122 05/11/2024       Lab Results   Component Value Date    WBC 12.62 (H) 05/11/2024    HGB 13.2 05/11/2024    HCT 37.5 05/11/2024    MCV 93 05/11/2024     05/11/2024     Lab Results   Component Value Date    INR 1.1 11/01/2020     Lab Results   Component Value Date    HGBA1C 5.4 01/11/2023           ROS:  General ROS: negative for - chills, fatigue, fever or night sweats, weight loss  Respiratory ROS: no cough, shortness of breath, or wheezing  Cardiovascular ROS: no chest pain or dyspnea on exertion  Genito-Urinary ROS: no dysuria, trouble voiding, or hematuria  Musculoskeletal ROS: negative for - gait disturbance, joint pain or muscle pain  Neurological ROS: no TIA or stroke symptoms  Gastrointestinal ROS: See HPI   GI ROS: see HPI  Skin ROS: no new rashes or lesions   Lymphatic ROS: no new adenopathy noted by pt.   GYN ROS: see HPI, no new GYN history or bleeding noted  Psy ROS: no new mental or behavioral disturbances       Patient Active Problem List   Diagnosis    Simple ovarian cyst         Allergies:  Patient has no known allergies.      Current Outpatient Medications:      acetaminophen (TYLENOL) 325 mg tablet, Take 3 tablets (975 mg total) by mouth every 8 (eight) hours, Disp: , Rfl:     etonogestrel (Nexplanon) subdermal implant, Inject 1 each under the skin, Disp: , Rfl:     naproxen (Naprosyn) 500 mg tablet, Take 1 tablet (500 mg total) by mouth 2 (two) times a day as needed for mild pain, Disp: 30 tablet, Rfl: 0    Past Medical History:   Diagnosis Date    No known health problems        History reviewed. No pertinent surgical history.    Family History   Problem Relation Age of Onset    No Known Problems Mother     No Known Problems Father         reports that she has never smoked. She has never been exposed to tobacco smoke. She has never used smokeless tobacco. She reports that she does not drink alcohol and does not use drugs.      Exam:   Vitals:    08/01/24 1601   BP: 100/68   Pulse: 86   Resp: 16   Temp: 98.5 °F (36.9 °C)   SpO2: 95%       PHYSICAL EXAM  General Appearance:    Alert, cooperative, no distress,    Head:    Normocephalic without obvious abnormality   Eyes:    PERRL, conjunctiva/corneas clear,       Neck:   Supple, no adenopathy, no JVD   Back:     Symmetric, no spinal or CVA tenderness   Lungs:     Clear to auscultation bilaterally, no wheezing or rhonchi   Heart:    Regular rate and rhythm, S1 and S2 normal, no murmur   Abdomen:     mild tenderness in the in the RUQ.      Extremities:   Extremities normal. No clubbing, cyanosis or edema   Psych:   Normal Affect, AOx3.    Neurologi  Skin:   CNII-XII intact. Strength symmetric, speech intact    Warm, dry, intact, no visible rashes or lesions      Informed consent for procedure was personally discussed, reviewed, and signed by Dr. Ge. Discussion by Dr. Ge was carried out regarding risks, benefits, and alternatives with the patient. Risks include but are not limited to:  bleeding, infection, and delayed wound healing or an open wound, pulmonary embolus, leaks from bowel or bile ducts or other viscus,  "transfusions, death.  Discussed in further detail the more common complications and their rates of occurrence.   was used if necessary.  Patient expressed understanding of the issues discussed and wished/consented to proceed.  All questions were answered by Dr. Ge.    Discussion performed between patient and the provider signing below.     Signature:   Briseida Mcknight PA-C    Date: 8/1/2024 Time: 4:21 PM                          Some portions of this record may have been generated with voice recognition software. There may be translation, syntax,  or grammatical errors. Occasional wrong word or \"sound-a-like\" substitutions may have occurred due to the inherent limitations of the voice recognition software. Read the chart carefully and recognize, using context, where substitutions may have occurred. If you have any questions, please contact the dictating provider for clarification or correction, as needed. This encounter has been coded by a non-certified coder.   "

## 2024-08-12 NOTE — PRE-PROCEDURE INSTRUCTIONS
Pre-Surgery Instructions:   Medication Instructions    acetaminophen (TYLENOL) 325 mg tablet Uses PRN- OK to take day of surgery    etonogestrel (Nexplanon) subdermal implant Implantable Nexplanon    naproxen (Naprosyn) 500 mg tablet Stop taking 7 days prior to surgery.   Medication instructions for day surgery reviewed. Please use only a sip of water to take your instructed medications. Avoid all over the counter vitamins, supplements and NSAIDS for one week prior to surgery per anesthesia guidelines. Tylenol is ok to take as needed.     You will receive a call one business day prior to surgery with an arrival time and hospital directions. If your surgery is scheduled on a Monday, the hospital will be calling you on the Friday prior to your surgery. If you have not heard from anyone by 8pm, please call the hospital supervisor through the hospital  at [phone]. ([place] [phone] or [place] [phone]).    Do not eat or drink anything after midnight the night before your surgery, including candy, mints, lifesavers, or chewing gum. Do not drink alcohol 24hrs before your surgery. Try not to smoke at least 24hrs before your surgery.       Follow the pre surgery showering instructions as listed in the “My Surgical Experience Booklet” or otherwise provided by your surgeon's office. Do not use a blade to shave the surgical area 1 week before surgery. It is okay to use a clean electric clippers up to 24 hours before surgery. Do not apply any lotions, creams, including makeup, cologne, deodorant, or perfumes after showering on the day of your surgery. Do not use dry shampoo, hair spray, hair gel, or any type of hair products.     No contact lenses, eye make-up, or artificial eyelashes. Remove nail polish, including gel polish, and any artificial, gel, or acrylic nails if possible. Remove all jewelry including rings and body piercing jewelry.     Wear causal clothing that is easy to take on and off.  Consider your type of surgery.    Keep any valuables, jewelry, piercings at home. Please bring any specially ordered equipment (sling, braces) if indicated.    Arrange for a responsible person to drive you to and from the hospital on the day of your surgery. Please confirm the visitor policy for the day of your procedure when you receive your phone call with an arrival time.     Call the surgeon's office with any new illnesses, exposures, or additional questions prior to surgery.    Please reference your “My Surgical Experience Booklet” for additional information to prepare for your upcoming surgery.

## 2024-08-14 ENCOUNTER — APPOINTMENT (OUTPATIENT)
Dept: LAB | Facility: HOSPITAL | Age: 29
End: 2024-08-14
Payer: MEDICARE

## 2024-08-14 DIAGNOSIS — Z01.818 ENCOUNTER FOR PREADMISSION TESTING: ICD-10-CM

## 2024-08-14 LAB
ALBUMIN SERPL BCG-MCNC: 4.7 G/DL (ref 3.5–5)
ALP SERPL-CCNC: 92 U/L (ref 34–104)
ALT SERPL W P-5'-P-CCNC: 89 U/L (ref 7–52)
ANION GAP SERPL CALCULATED.3IONS-SCNC: 6 MMOL/L (ref 4–13)
AST SERPL W P-5'-P-CCNC: 59 U/L (ref 13–39)
BASOPHILS # BLD AUTO: 0.03 THOUSANDS/ÂΜL (ref 0–0.1)
BASOPHILS NFR BLD AUTO: 0 % (ref 0–1)
BILIRUB SERPL-MCNC: 0.77 MG/DL (ref 0.2–1)
BUN SERPL-MCNC: 10 MG/DL (ref 5–25)
CALCIUM SERPL-MCNC: 9.7 MG/DL (ref 8.4–10.2)
CHLORIDE SERPL-SCNC: 106 MMOL/L (ref 96–108)
CO2 SERPL-SCNC: 25 MMOL/L (ref 21–32)
CREAT SERPL-MCNC: 0.56 MG/DL (ref 0.6–1.3)
EOSINOPHIL # BLD AUTO: 0.06 THOUSAND/ÂΜL (ref 0–0.61)
EOSINOPHIL NFR BLD AUTO: 1 % (ref 0–6)
ERYTHROCYTE [DISTWIDTH] IN BLOOD BY AUTOMATED COUNT: 11.9 % (ref 11.6–15.1)
GFR SERPL CREATININE-BSD FRML MDRD: 126 ML/MIN/1.73SQ M
GLUCOSE SERPL-MCNC: 100 MG/DL (ref 65–140)
HCT VFR BLD AUTO: 39.1 % (ref 34.8–46.1)
HGB BLD-MCNC: 13.3 G/DL (ref 11.5–15.4)
IMM GRANULOCYTES # BLD AUTO: 0.02 THOUSAND/UL (ref 0–0.2)
IMM GRANULOCYTES NFR BLD AUTO: 0 % (ref 0–2)
LYMPHOCYTES # BLD AUTO: 2.71 THOUSANDS/ÂΜL (ref 0.6–4.47)
LYMPHOCYTES NFR BLD AUTO: 31 % (ref 14–44)
MCH RBC QN AUTO: 32 PG (ref 26.8–34.3)
MCHC RBC AUTO-ENTMCNC: 34 G/DL (ref 31.4–37.4)
MCV RBC AUTO: 94 FL (ref 82–98)
MONOCYTES # BLD AUTO: 0.38 THOUSAND/ÂΜL (ref 0.17–1.22)
MONOCYTES NFR BLD AUTO: 4 % (ref 4–12)
NEUTROPHILS # BLD AUTO: 5.42 THOUSANDS/ÂΜL (ref 1.85–7.62)
NEUTS SEG NFR BLD AUTO: 64 % (ref 43–75)
NRBC BLD AUTO-RTO: 0 /100 WBCS
PLATELET # BLD AUTO: 292 THOUSANDS/UL (ref 149–390)
PMV BLD AUTO: 10.7 FL (ref 8.9–12.7)
POTASSIUM SERPL-SCNC: 3.9 MMOL/L (ref 3.5–5.3)
PROT SERPL-MCNC: 8.3 G/DL (ref 6.4–8.4)
RBC # BLD AUTO: 4.16 MILLION/UL (ref 3.81–5.12)
SODIUM SERPL-SCNC: 137 MMOL/L (ref 135–147)
WBC # BLD AUTO: 8.62 THOUSAND/UL (ref 4.31–10.16)

## 2024-08-14 PROCEDURE — 85025 COMPLETE CBC W/AUTO DIFF WBC: CPT

## 2024-08-14 PROCEDURE — 36415 COLL VENOUS BLD VENIPUNCTURE: CPT

## 2024-08-14 PROCEDURE — 80053 COMPREHEN METABOLIC PANEL: CPT

## 2024-08-19 ENCOUNTER — ANESTHESIA EVENT (OUTPATIENT)
Dept: PERIOP | Facility: HOSPITAL | Age: 29
End: 2024-08-19
Payer: MEDICARE

## 2024-08-21 ENCOUNTER — ANESTHESIA (OUTPATIENT)
Dept: PERIOP | Facility: HOSPITAL | Age: 29
End: 2024-08-21
Payer: MEDICARE

## 2024-08-21 ENCOUNTER — APPOINTMENT (OUTPATIENT)
Dept: RADIOLOGY | Facility: HOSPITAL | Age: 29
End: 2024-08-21
Payer: MEDICARE

## 2024-08-21 ENCOUNTER — HOSPITAL ENCOUNTER (OUTPATIENT)
Facility: HOSPITAL | Age: 29
Setting detail: OUTPATIENT SURGERY
Discharge: HOME | End: 2024-08-21
Attending: SURGERY | Admitting: SURGERY
Payer: MEDICARE

## 2024-08-21 VITALS
BODY MASS INDEX: 29.49 KG/M2 | DIASTOLIC BLOOD PRESSURE: 71 MMHG | TEMPERATURE: 98.2 F | RESPIRATION RATE: 18 BRPM | SYSTOLIC BLOOD PRESSURE: 112 MMHG | HEIGHT: 65 IN | OXYGEN SATURATION: 97 % | HEART RATE: 84 BPM | WEIGHT: 177.03 LBS

## 2024-08-21 DIAGNOSIS — Z90.49 S/P CHOLECYSTECTOMY: Primary | ICD-10-CM

## 2024-08-21 DIAGNOSIS — K35.80 ACUTE APPENDICITIS: ICD-10-CM

## 2024-08-21 DIAGNOSIS — K80.20 CALCULUS OF GALLBLADDER: ICD-10-CM

## 2024-08-21 LAB
EXT PREGNANCY TEST URINE: NEGATIVE
EXT. CONTROL: NORMAL

## 2024-08-21 PROCEDURE — 88304 TISSUE EXAM BY PATHOLOGIST: CPT | Performed by: STUDENT IN AN ORGANIZED HEALTH CARE EDUCATION/TRAINING PROGRAM

## 2024-08-21 PROCEDURE — 47562 LAPAROSCOPIC CHOLECYSTECTOMY: CPT | Performed by: SURGERY

## 2024-08-21 PROCEDURE — S2900 ROBOTIC SURGICAL SYSTEM: HCPCS | Performed by: SURGERY

## 2024-08-21 PROCEDURE — 47562 LAPAROSCOPIC CHOLECYSTECTOMY: CPT | Performed by: PHYSICIAN ASSISTANT

## 2024-08-21 PROCEDURE — 81025 URINE PREGNANCY TEST: CPT | Performed by: ANESTHESIOLOGY

## 2024-08-21 RX ORDER — ACETAMINOPHEN 325 MG/1
650 TABLET ORAL EVERY 6 HOURS PRN
Status: DISCONTINUED | OUTPATIENT
Start: 2024-08-21 | End: 2024-08-21 | Stop reason: HOSPADM

## 2024-08-21 RX ORDER — PROPOFOL 10 MG/ML
INJECTION, EMULSION INTRAVENOUS AS NEEDED
Status: DISCONTINUED | OUTPATIENT
Start: 2024-08-21 | End: 2024-08-21

## 2024-08-21 RX ORDER — MIDAZOLAM HYDROCHLORIDE 2 MG/2ML
INJECTION, SOLUTION INTRAMUSCULAR; INTRAVENOUS AS NEEDED
Status: DISCONTINUED | OUTPATIENT
Start: 2024-08-21 | End: 2024-08-21

## 2024-08-21 RX ORDER — LIDOCAINE HYDROCHLORIDE 10 MG/ML
INJECTION, SOLUTION EPIDURAL; INFILTRATION; INTRACAUDAL; PERINEURAL AS NEEDED
Status: DISCONTINUED | OUTPATIENT
Start: 2024-08-21 | End: 2024-08-21

## 2024-08-21 RX ORDER — HYDROMORPHONE HCL/PF 1 MG/ML
0.5 SYRINGE (ML) INJECTION
Status: DISCONTINUED | OUTPATIENT
Start: 2024-08-21 | End: 2024-08-21 | Stop reason: HOSPADM

## 2024-08-21 RX ORDER — FENTANYL CITRATE 50 UG/ML
INJECTION, SOLUTION INTRAMUSCULAR; INTRAVENOUS AS NEEDED
Status: DISCONTINUED | OUTPATIENT
Start: 2024-08-21 | End: 2024-08-21

## 2024-08-21 RX ORDER — FENTANYL CITRATE/PF 50 MCG/ML
25 SYRINGE (ML) INJECTION
Status: DISCONTINUED | OUTPATIENT
Start: 2024-08-21 | End: 2024-08-21 | Stop reason: HOSPADM

## 2024-08-21 RX ORDER — OXYCODONE HYDROCHLORIDE 5 MG/1
5 TABLET ORAL EVERY 4 HOURS PRN
Status: DISCONTINUED | OUTPATIENT
Start: 2024-08-21 | End: 2024-08-21 | Stop reason: HOSPADM

## 2024-08-21 RX ORDER — PHENYLEPHRINE HCL IN 0.9% NACL 1 MG/10 ML
SYRINGE (ML) INTRAVENOUS AS NEEDED
Status: DISCONTINUED | OUTPATIENT
Start: 2024-08-21 | End: 2024-08-21

## 2024-08-21 RX ORDER — SODIUM CHLORIDE, SODIUM LACTATE, POTASSIUM CHLORIDE, CALCIUM CHLORIDE 600; 310; 30; 20 MG/100ML; MG/100ML; MG/100ML; MG/100ML
125 INJECTION, SOLUTION INTRAVENOUS CONTINUOUS
Status: DISCONTINUED | OUTPATIENT
Start: 2024-08-21 | End: 2024-08-21 | Stop reason: HOSPADM

## 2024-08-21 RX ORDER — INDOCYANINE GREEN AND WATER 25 MG
7.5 KIT INJECTION ONCE
Status: COMPLETED | OUTPATIENT
Start: 2024-08-21 | End: 2024-08-21

## 2024-08-21 RX ORDER — MEPERIDINE HYDROCHLORIDE 25 MG/ML
12.5 INJECTION INTRAMUSCULAR; INTRAVENOUS; SUBCUTANEOUS
Status: DISCONTINUED | OUTPATIENT
Start: 2024-08-21 | End: 2024-08-21 | Stop reason: HOSPADM

## 2024-08-21 RX ORDER — HYDROMORPHONE HCL/PF 1 MG/ML
SYRINGE (ML) INJECTION AS NEEDED
Status: DISCONTINUED | OUTPATIENT
Start: 2024-08-21 | End: 2024-08-21

## 2024-08-21 RX ORDER — ONDANSETRON 2 MG/ML
4 INJECTION INTRAMUSCULAR; INTRAVENOUS ONCE AS NEEDED
Status: DISCONTINUED | OUTPATIENT
Start: 2024-08-21 | End: 2024-08-21 | Stop reason: HOSPADM

## 2024-08-21 RX ORDER — DEXAMETHASONE SODIUM PHOSPHATE 10 MG/ML
INJECTION, SOLUTION INTRAMUSCULAR; INTRAVENOUS AS NEEDED
Status: DISCONTINUED | OUTPATIENT
Start: 2024-08-21 | End: 2024-08-21

## 2024-08-21 RX ORDER — ONDANSETRON 2 MG/ML
INJECTION INTRAMUSCULAR; INTRAVENOUS AS NEEDED
Status: DISCONTINUED | OUTPATIENT
Start: 2024-08-21 | End: 2024-08-21

## 2024-08-21 RX ORDER — CEFAZOLIN SODIUM 2 G/50ML
2000 SOLUTION INTRAVENOUS ONCE
Status: COMPLETED | OUTPATIENT
Start: 2024-08-21 | End: 2024-08-21

## 2024-08-21 RX ORDER — ROCURONIUM BROMIDE 10 MG/ML
INJECTION, SOLUTION INTRAVENOUS AS NEEDED
Status: DISCONTINUED | OUTPATIENT
Start: 2024-08-21 | End: 2024-08-21

## 2024-08-21 RX ORDER — HYDROCODONE BITARTRATE AND ACETAMINOPHEN 5; 325 MG/1; MG/1
1 TABLET ORAL EVERY 6 HOURS PRN
Qty: 6 TABLET | Refills: 0 | Status: SHIPPED | OUTPATIENT
Start: 2024-08-21 | End: 2024-08-31

## 2024-08-21 RX ORDER — KETOROLAC TROMETHAMINE 30 MG/ML
INJECTION, SOLUTION INTRAMUSCULAR; INTRAVENOUS AS NEEDED
Status: DISCONTINUED | OUTPATIENT
Start: 2024-08-21 | End: 2024-08-21

## 2024-08-21 RX ADMIN — ROCURONIUM BROMIDE 15 MG: 10 INJECTION, SOLUTION INTRAVENOUS at 12:26

## 2024-08-21 RX ADMIN — Medication 100 MCG: at 12:14

## 2024-08-21 RX ADMIN — SUGAMMADEX 200 MG: 100 INJECTION, SOLUTION INTRAVENOUS at 12:50

## 2024-08-21 RX ADMIN — FENTANYL CITRATE 50 MCG: 50 INJECTION INTRAMUSCULAR; INTRAVENOUS at 11:14

## 2024-08-21 RX ADMIN — Medication 100 MCG: at 12:28

## 2024-08-21 RX ADMIN — KETOROLAC TROMETHAMINE 30 MG: 30 INJECTION, SOLUTION INTRAMUSCULAR; INTRAVENOUS at 12:47

## 2024-08-21 RX ADMIN — ROCURONIUM BROMIDE 10 MG: 10 INJECTION, SOLUTION INTRAVENOUS at 11:59

## 2024-08-21 RX ADMIN — Medication 100 MCG: at 11:40

## 2024-08-21 RX ADMIN — FENTANYL CITRATE 25 MCG: 50 INJECTION INTRAMUSCULAR; INTRAVENOUS at 12:29

## 2024-08-21 RX ADMIN — OXYCODONE HYDROCHLORIDE 5 MG: 5 TABLET ORAL at 15:07

## 2024-08-21 RX ADMIN — Medication 100 MCG: at 11:29

## 2024-08-21 RX ADMIN — LIDOCAINE HYDROCHLORIDE 100 MG: 10 INJECTION, SOLUTION EPIDURAL; INFILTRATION; INTRACAUDAL; PERINEURAL at 11:19

## 2024-08-21 RX ADMIN — INDOCYANINE GREEN AND WATER 7.5 MG: KIT at 10:13

## 2024-08-21 RX ADMIN — ROCURONIUM BROMIDE 50 MG: 10 INJECTION, SOLUTION INTRAVENOUS at 11:20

## 2024-08-21 RX ADMIN — FENTANYL CITRATE 25 MCG: 50 INJECTION INTRAMUSCULAR; INTRAVENOUS at 12:52

## 2024-08-21 RX ADMIN — HYDROMORPHONE HYDROCHLORIDE 0.25 MG: 0.5 INJECTION, SOLUTION INTRAMUSCULAR; INTRAVENOUS; SUBCUTANEOUS at 13:07

## 2024-08-21 RX ADMIN — Medication 200 MCG: at 11:32

## 2024-08-21 RX ADMIN — MIDAZOLAM 2 MG: 1 INJECTION INTRAMUSCULAR; INTRAVENOUS at 11:14

## 2024-08-21 RX ADMIN — FENTANYL CITRATE 25 MCG: 50 INJECTION INTRAMUSCULAR; INTRAVENOUS at 13:56

## 2024-08-21 RX ADMIN — SODIUM CHLORIDE, SODIUM LACTATE, POTASSIUM CHLORIDE, AND CALCIUM CHLORIDE: .6; .31; .03; .02 INJECTION, SOLUTION INTRAVENOUS at 12:31

## 2024-08-21 RX ADMIN — Medication 200 MCG: at 12:35

## 2024-08-21 RX ADMIN — PROPOFOL 200 MG: 10 INJECTION, EMULSION INTRAVENOUS at 11:20

## 2024-08-21 RX ADMIN — DEXAMETHASONE SODIUM PHOSPHATE 10 MG: 10 INJECTION INTRAMUSCULAR; INTRAVENOUS at 11:23

## 2024-08-21 RX ADMIN — Medication 100 MCG: at 11:36

## 2024-08-21 RX ADMIN — FENTANYL CITRATE 25 MCG: 50 INJECTION INTRAMUSCULAR; INTRAVENOUS at 14:03

## 2024-08-21 RX ADMIN — ONDANSETRON 4 MG: 2 INJECTION INTRAMUSCULAR; INTRAVENOUS at 12:44

## 2024-08-21 RX ADMIN — FENTANYL CITRATE 25 MCG: 50 INJECTION INTRAMUSCULAR; INTRAVENOUS at 12:55

## 2024-08-21 RX ADMIN — CEFAZOLIN SODIUM 2000 MG: 2 SOLUTION INTRAVENOUS at 11:14

## 2024-08-21 RX ADMIN — FENTANYL CITRATE 50 MCG: 50 INJECTION INTRAMUSCULAR; INTRAVENOUS at 11:29

## 2024-08-21 RX ADMIN — FENTANYL CITRATE 25 MCG: 50 INJECTION INTRAMUSCULAR; INTRAVENOUS at 11:54

## 2024-08-21 RX ADMIN — Medication 100 MCG: at 12:10

## 2024-08-21 RX ADMIN — HYDROMORPHONE HYDROCHLORIDE 0.25 MG: 0.5 INJECTION, SOLUTION INTRAMUSCULAR; INTRAVENOUS; SUBCUTANEOUS at 13:11

## 2024-08-21 RX ADMIN — SODIUM CHLORIDE, SODIUM LACTATE, POTASSIUM CHLORIDE, AND CALCIUM CHLORIDE 125 ML/HR: .6; .31; .03; .02 INJECTION, SOLUTION INTRAVENOUS at 10:12

## 2024-08-21 RX ADMIN — SODIUM CHLORIDE, SODIUM LACTATE, POTASSIUM CHLORIDE, AND CALCIUM CHLORIDE: .6; .31; .03; .02 INJECTION, SOLUTION INTRAVENOUS at 11:54

## 2024-08-21 NOTE — DISCHARGE INSTR - AVS FIRST PAGE
[facility]  Post-Operative Care Instructions  Dr. Jia Ge MD, FACS    1. General: You will feel pulling sensations around the wound or funny aches and pains around the incisions. This is normal. Even minor surgery is a change in your body and this is your body’s way of reacting to it. If you have had abdominal surgery, it may help to support the incision with a small pillow or blanket for comfort when moving or coughing.    2. Wound care: Make sure to remove the bandage in about 24 hours, unless instructed otherwise. You usually don't have to redress the wound after 24-48 hours, unless for comfort. Keep the incision clean and dry. Let it air out. If the Steri-Strips fall off, just keep the wound clean.    3. Water: You may shower over the wound, unless there are drain tubes left in place. You may shower right over the staples or Steri-Strips, let soap and water run over the wounds but do not scrub, and pat dry when you are done. Do not bathe or use a pool or hot tub until cleared by the physician.    4. Activity: You may go up and down stairs, walk as much as you are comfortable, but walk at least 3 times each day. If you have had abdominal surgery, do not lift anything heavier than 15 pounds for at least 2-4 weeks, unless cleared by the doctor.    5. Diet: You may resume a regular diet. If you had a same-day surgery or overnight stay surgery, you may wish to eat lightly for a few days: soups, crackers, and sandwiches. You may resume a regular diet when ready.    6. Medications: Resume all of your previous medications, unless told otherwise by the doctor. Avoid aspirin or ibuprofen (Advil, Motrin, etc.) products for 2-3 days after the date of surgery. You may, at that time, began to take them again. Tylenol is always fine, unless you are taking any narcotic pain medication containing Tylenol (such as Percocet, Darvocet, Vicodin, or anything containing acetaminophen). Do not take Tylenol if  you're taking these medications. You do not need to take the narcotic pain medications unless you are having significant pain and discomfort.    7. Driving: You will need someone to drive you home on the day of surgery. Do not drive or make any important decisions while on narcotic pain medication or 24 hours and after anesthesia or sedation for surgery. Generally, you may drive when you're off all narcotic pain medications.    8. Upset Stomach: You may take Maalox, Tums, or similar items for an upset stomach. If your narcotic pain medication causes an upset stomach, do not take it on an empty stomach. Try taking it with at least some crackers or toast.     9. Constipation: Patients often experience constipation after surgery. You may take over-the-counter medication for this, such as Metamucil, Senokot, Dulcolax, milk of magnesia, etc. You may take a suppository unless you have had anorectal surgery such as a procedure on your hemorrhoids. If you experience significant nausea or vomiting after abdominal surgery, call the office before trying any of these medications.    10. Call the office: If you are experiencing any of the following: fevers above 101.5°, significant nausea or vomiting, if the wound develops drainage and/or excessive or spreading redness around the wound, or if you have significant diarrhea or other worsening symptoms.    11. Pain: You may be given a prescription for pain. This will be given to you at the hospital, the day of surgery.    12. Sexual Activity: You may resume sexual activity when you feel ready and comfortable and your incision is sealed and healed without apparent infection risk.    13. Urination: If you haven't urinated in 6 hours, go directly to the ER for evaluation for urinary retention.     [facility]  [address] [place] [address], [address]  [place] [address]  Phone: [phone]

## 2024-08-21 NOTE — ANESTHESIA POSTPROCEDURE EVALUATION
"Post-Op Assessment Note    CV Status:  Stable    Pain management: adequate       Mental Status:  Alert and awake   Hydration Status:  Euvolemic   PONV Controlled:  Controlled   Airway Patency:  Patent     Post Op Vitals Reviewed: Yes    No anethesia notable event occurred.    Staff: Anesthesiologist     Reason for prolonged intubation > 24 hours:  Pt was extubated in ORReason for prolonged intubation > 48 hours:  Pt was extubated in OR          BP      Temp      Pulse     Resp      SpO2      /71   Pulse 84   Temp 98.2 °F (36.8 °C) (Temporal)   Resp 18   Ht 5' 5\" (1.651 m)   Wt 80.3 kg (177 lb 0.5 oz)   LMP 08/02/2024 (Approximate)   SpO2 97%   BMI 29.46 kg/m²     "

## 2024-08-21 NOTE — OP NOTE
CHOLECYSTECTOMY LAPAROSCOPIC  ROBOTIC ASSISTED  Postoperative Note  PATIENT NAME: Gi Monroe  : 1995  MRN: [id number]  [facility] OR ROOM 08    Surgery Date: 2024    Pre operative diagnosis:  Calculus of gallbladder [K80.20]  Acute appendicitis [K35.80]    Operative Indications:  Symptomatic gallbladder disease    Consent:  The risks, benefits, and alternatives to the surgery were discussed with the patient and with the family prior to surgery if present, personally by Dr. Ge.  If the consent was obtained by the physician assistant or other representative, the consent was reviewed once again personally by the operating physician.  Common complications particular for this procedure as well as unusual complications were discussed, including but not limited to:  bleeding, wound infection, prolonged wound healing, open wounds, reoperation, leak from the bowel or viscus, leak from the bile duct or injury to adjacent or other organs or blood vessels in the abdomen. The significance of bile duct reconstruction was discussed.  If the surgery was laparoscopic, it was discussed that possible open surgery could also occur during that same surgery and is always an option in laparoscopic surgery and/or reoperation.  A  was used if necessary.  The patient expressed understanding of the issues discussed and wished and consented to the procedure to proceed.  All questions were answered.  Dr. Ge personally discussed the informed consent with this patient.      Operative Findings:  Excellent critical view  Excellent ICG view    Very large gallbladder, chronic infection, white bile, impacted fully with stones    Appendix normal appearing.       Post operative diagnosis:  Post-Op Diagnosis Codes:     * Calculus of gallbladder [K80.20]     * Acute appendicitis [K35.80]    Procedure:   Procedure(s):  CHOLECYSTECTOMY LAPAROSCOPIC  ROBOTIC ASSISTED  Diagnostic laparoscopy, lysis of adhesions   Interpretation  of fluorescein dye by surgeon.            Surgeons and Role:     * Jia Ge MD - Primary     * JM Vincent-JAZLYN - Assisting first      * Darshan Bills MD - Assisting        The First  assistant/PA was medically necessary for surgical safety the case including suturing, retraction, and hemostasis. A qualified resident was available for first assistance.  I provided direct and immediate supervision.  I was present for the entire procedure.     Drains:  * No LDAs found *    Specimens:  ID Type Source Tests Collected by Time Destination   1 :  Tissue Gallbladder TISSUE EXAM Jia Ge MD 8/21/2024 1230        Estimated Blood Loss:   Minimal    Anesthesia Type:   General     Procedure:  The patient was seen again in the Holding Room. The risks, benefits, complications, treatment options, and expected outcomes were discussed with the patient. The possibilities of reaction to medication, pulmonary aspiration, perforation of viscus, bleeding, recurrent infection, finding a normal gallbladder, the need for additional procedures, failure to diagnose a condition, the possible need to convert to an open procedure, and creating a complication requiring transfusion or operation were discussed with the patient. The patient and/or family concurred with the proposed plan, giving informed consent. The site of surgery properly noted/marked. The patient was taken to Operating Room, identified as Gi Monroe and the procedure verified as Laparoscopic Cholecystectomy with Possible Intraoperative Cholangiogram. A Time Out was held and the above information confirmed.    Prior to the induction of general anesthesia, antibiotic prophylaxis was administered. General endotracheal anesthesia was then administered and tolerated well. After the induction, the abdomen was prepped in the usual sterile fashion. The patient was positioned in the supine position.The patient was positioned in the supine position, along with  some reverse Trendelenburg.    Local anesthetic agent was injected into the skin near the umbilicus and an incision made. The midline fascia was incised and the open technique was used to introduce a  port under direct vision.  Pneumoperitoneum was then created with CO2 and tolerated well without any adverse changes in the patient's vital signs. Additional trocars were introduced under direct vision. All skin incisions were infiltrated with a local anesthetic agent before making the incision and placing the trocars.  The patient was placed in the head up, left side down position.     Robotic ports were used.  The robot was then docked.    The gallbladder was identified, the fundus grasped and retracted cephalad. Adhesions were lysed bluntly and with the electrocautery where indicated, taking care not to injure any adjacent organs or viscus. The infundibulum was grasped and retracted laterally, exposing the peritoneum overlying the triangle of Calot. The peritoneum was removed anteriorly and posteriorly to the gallbladder, with special attention to the backside of the gallbladder dissection.  This allowed for freeing up the gallbladder.      The critical view of the triangle Calot was carried out, dissecting out the cystic duct and cystic artery as the only two tubular structures leading to the gallbladder. Once these were clearly identified, the back wall of the gallbladder was lifted away from the cystic plate to expose the posterior aspect of this dissection, ensuring that there were no posterior structures leading into the liver.     Fluorescein dye had been given to the patient preoperatively.  Using the appropriate camera view, the common bile duct was visualized and well away from the cystic duct during the critical view.    The cystic duct was then doubly ligated with Surgical Clips on the patient side and singly clipped on the gallbladder side and divided. The cystic artery was re-identified and ligated with  "clips and divided as well.     The gallbladder was dissected from the liver bed in retrograde fashion with the electrocautery.   The gallbladder was removed, via an Endo pouch, through the umbilical  port.     The liver bed was irrigated and inspected. Hemostasis was achieved. Copious irrigation was utilized and was repeatedly aspirated until clear.      Patient was repositioned with headdown position and the robot undocked.  With some retraction the appendix was easily visualized and was mostly free-floating.  It was very normal in appearance although fatty.  The base of the appendix also looked normal and there was no obvious abnormality.  The appendix was seen through its entire length.    There is no indication for appendectomy at this time.    The pneumoperitoneum was completely reduced after viewing removal of the trocars under direct vision. The wounds were thoroughly irrigated and the larger port site fascia was then closed with a figure of eight suture; the skin was then closed with 4-0 Monocryl sutures and a sterile dressing was applied.      Sponge count and needle count and instrument count were correct x2, and RFA wanding for sponges was also negative at the end of the procedure prior to closure.     The patient tolerated the procedure well.      Some portions of this record may have been generated with voice recognition software. There may be translation, syntax,  or grammatical errors. Occasional wrong word or \"sound-a-like\" substitutions may have occurred due to the inherent limitations of the voice recognition software. Read the chart carefully and recognize, using context, where substations may have occurred. If you have any questions, please contact the dictating provider for clarification or correction, as needed.       Complications: None    Condition: Stable to PACU    SIGNATURE: Jia Alexander MD   DATE: August 21, 2024   TIME: 12:47 PM    "

## 2024-08-21 NOTE — INTERVAL H&P NOTE
H&P reviewed. After examining the patient I find no changes in the patients condition since the H&P had been written.    Vitals:    08/21/24 1011   BP: 129/73   Pulse: 103   Resp: 18   Temp: 98.5 °F (36.9 °C)   SpO2: 99%       Patient does have cholelithiasis and a mildly edematous gallbladder.  Will do the cholecystectomy first as this seems to be symptomatic.  Will take a look at the appendix but if this appears normal on today's exam we will not proceed with an appendectomy.  If it appears abnormal we may proceed with an appendectomy.  Patient is aware she may have to have an appendectomy in the future./Interval appendectomy.      Risks of each were explained to the patient once again and she agrees to proceed.    Informed consent for procedure was personally discussed, reviewed, and signed by Dr. Ge. Discussion by Dr. Ge was carried out regarding risks, benefits, and alternatives with the patient.   Risks include but are not limited to:  bleeding, infection, and delayed wound healing or an open wound, pulmonary embolus, leaks from or injury to the bowel or bile ducts or other viscus or blood vessels, possible need for transfusions, and death.  If the patient was at a higher average risk due to comorbidities including but not limited to: smoking, obesity, diabetes, this was also discussed that the patient was at a higher than average risk for postoperative complications or intraoperative issues altering the course of events.    This also included the risk of mesh use during the course of the surgery if indicated, the short-term and long-term implications of using mesh including the risk of bowel fistula, multiple operations or failure of the mesh.      Discussion was also carried out regarding the need for additional procedures as indicated during the course of the surgery which may not have been explicitly discussed prior to the procedure.  Discussed in further detail the more common complications  and their rates of occurrence.      A  was used if necessary.      Patient expressed understanding of the issues discussed and wished/consented to proceed.  All questions were answered by Dr. Ge and Dr. Ge personally reviewed this consent as dictated above.    Discussion performed between patient and the provider signing below.     Signature:   Jia Ge MD  Date: 8/21/2024 Time: 10:37 AM   Signature:   Jia Ge MD  Date: 8/21/2024 Time: 10:37 AM

## 2024-08-21 NOTE — ANESTHESIA PREPROCEDURE EVALUATION
Procedure:  CHOLECYSTECTOMY LAPAROSCOPIC POSSIBLE OPEN AND OR CHOLANGIOGRAM, POSSIBLE ROBOTIC ASSISTED, POSSIBLE APPENDECTOMY (Abdomen)  APPENDECTOMY LAPAROSCOPIC (Abdomen)    Relevant Problems   Obstetrics/Gynecology   (+) Simple ovarian cyst        Physical Exam    Airway    Mallampati score: III  TM Distance: >3 FB  Neck ROM: full     Dental   No notable dental hx     Cardiovascular  Rhythm: regular, Rate: normal, Cardiovascular exam normal    Pulmonary  Pulmonary exam normal Breath sounds clear to auscultation    Other Findings  post-pubertal.      Anesthesia Plan  ASA Score- 2     Anesthesia Type- general with ASA Monitors.         Additional Monitors:     Airway Plan: ETT.           Plan Factors-    Chart reviewed.   Existing labs reviewed. Patient summary reviewed.                  Induction- intravenous.    Postoperative Plan- Plan for postoperative opioid use.         Informed Consent- Anesthetic plan and risks discussed with patient.  I personally reviewed this patient with the CRNA. Discussed and agreed on the Anesthesia Plan with the CRNA..

## 2024-08-21 NOTE — ANESTHESIA POSTPROCEDURE EVALUATION
Post-Op Assessment Note    CV Status:  Stable  Pain Score: 0    Pain management: adequate       Mental Status:  Awake and sleepy   Hydration Status:  Euvolemic   PONV Controlled:  Controlled   Airway Patency:  Patent     Post Op Vitals Reviewed: Yes    No anethesia notable event occurred.    Staff: DEBORAH               /55 (08/21/24 1313)    Temp (!) 96.9 °F (36.1 °C) (08/21/24 1313)    Pulse (!) 116 (08/21/24 1313)   Resp 17 (08/21/24 1313)    SpO2 100 % (08/21/24 1313)

## 2024-08-22 ENCOUNTER — TELEPHONE (OUTPATIENT)
Dept: SURGERY | Facility: CLINIC | Age: 29
End: 2024-08-22

## 2024-08-22 NOTE — TELEPHONE ENCOUNTER
Post op call:  Spoke with patient states she is feeling good minor pain that she expected no nausea fever chills she is to call back with any concern. We will reach back out to patient will pathology results once the doctor has reviewed them.    Scheduled patient for a post op with Briseida 09/05/24 11:45am

## 2024-08-26 PROCEDURE — 88304 TISSUE EXAM BY PATHOLOGIST: CPT | Performed by: STUDENT IN AN ORGANIZED HEALTH CARE EDUCATION/TRAINING PROGRAM

## 2024-08-26 NOTE — PROGRESS NOTES
Assessment/Plan:   Gi Monroe is a 29 y.o.female who comes in today for postoperative check after robotic cholecystectomy with Dr. Ge 8/21/24.      Patient is pleased with the outcome of surgery and is doing well.     Pathology: Pathology reviewed with patient, all questions answered.  Final Diagnosis   A. Gallbladder, Cholecystectomy:  - Chronic cholecystitis and cholelithiasis.     Referral to GI for epigastric pain, instructed avoid spicy or acidic and fried foods. Use tums PRN and follow up with PCP for possible omeprazole management.   ______________________________________________________  HPI:  Gi Monroe is a 29 y.o.female who comes in today for postoperative check after recent  robotic cholecystectomy with Dr. Ge 8/21/24.    Currently doing well with some epigastric pain when eating spicy or fried foods, no fever or chills,no nausea and no vomiting. Patient reports they have resumed their normal diet. denies biliary diarrhea. Reports no issues.    ROS:  General ROS: negative for - chills, fatigue, fever or night sweats, weight loss  Respiratory ROS: no cough, shortness of breath, or wheezing  Cardiovascular ROS: no chest pain or dyspnea on exertion  Genito-Urinary ROS: no dysuria, trouble voiding, or hematuria  Musculoskeletal ROS: negative for - gait disturbance, joint pain or muscle pain  Neurological ROS: no TIA or stroke symptoms  GI ROS: see HPI  Skin ROS: no new rashes or lesions   Lymphatic ROS: no new adenopathy noted by pt.   Psy ROS: no new mental or behavioral disturbances       Patient Active Problem List   Diagnosis    Simple ovarian cyst           Allergies:  Patient has no known allergies.      Current Outpatient Medications:     acetaminophen (TYLENOL) 325 mg tablet, Take 3 tablets (975 mg total) by mouth every 8 (eight) hours, Disp: , Rfl:     etonogestrel (Nexplanon) subdermal implant, Inject 1 each under the skin, Disp: , Rfl:     naproxen (Naprosyn) 500 mg  tablet, Take 1 tablet (500 mg total) by mouth 2 (two) times a day as needed for mild pain, Disp: 30 tablet, Rfl: 0    Past Medical History:   Diagnosis Date    No known health problems        Past Surgical History:   Procedure Laterality Date    FL LAPAROSCOPY SURG CHOLECYSTECTOMY N/A 8/21/2024    Procedure: CHOLECYSTECTOMY LAPAROSCOPIC  ROBOTIC ASSISTED;  Surgeon: Jia Ge MD;  Location: [facility];  Service: General    SOFT TISSUE CYST EXCISION      head-2023       Family History   Problem Relation Age of Onset    No Known Problems Mother     No Known Problems Father         reports that she has never smoked. She has never been exposed to tobacco smoke. She has never used smokeless tobacco. She reports that she does not drink alcohol and does not use drugs.    Vitals:    09/05/24 1205   BP: 122/74   Pulse: 93   Resp: 16   Temp: 97.9 °F (36.6 °C)   SpO2: 93%       PHYSICAL EXAM  General: normal, cooperative, no distress  Abdominal: soft, nondistended, or nontender  Incision: clean, dry, and intact and healing well; sutures removed from supraumbilical incisions        Briseida Mcknight PA-C      Date: 9/5/2024 Time: 12:21 PM

## 2024-09-05 ENCOUNTER — OFFICE VISIT (OUTPATIENT)
Dept: SURGERY | Facility: CLINIC | Age: 29
End: 2024-09-05

## 2024-09-05 VITALS
RESPIRATION RATE: 16 BRPM | OXYGEN SATURATION: 93 % | HEIGHT: 65 IN | HEART RATE: 93 BPM | WEIGHT: 180 LBS | SYSTOLIC BLOOD PRESSURE: 122 MMHG | DIASTOLIC BLOOD PRESSURE: 74 MMHG | BODY MASS INDEX: 29.99 KG/M2 | TEMPERATURE: 97.9 F

## 2024-09-05 DIAGNOSIS — R10.13 EPIGASTRIC PAIN: Primary | ICD-10-CM

## 2024-09-05 PROCEDURE — 99024 POSTOP FOLLOW-UP VISIT: CPT | Performed by: PHYSICIAN ASSISTANT

## 2024-09-12 ENCOUNTER — APPOINTMENT (EMERGENCY)
Dept: CT IMAGING | Facility: HOSPITAL | Age: 29
End: 2024-09-12
Payer: MEDICARE

## 2024-09-12 ENCOUNTER — HOSPITAL ENCOUNTER (EMERGENCY)
Facility: HOSPITAL | Age: 29
End: 2024-09-12
Attending: EMERGENCY MEDICINE
Payer: MEDICARE

## 2024-09-12 ENCOUNTER — HOSPITAL ENCOUNTER (INPATIENT)
Facility: HOSPITAL | Age: 29
LOS: 2 days | Discharge: HOME | DRG: 282 | End: 2024-09-14
Attending: INTERNAL MEDICINE | Admitting: INTERNAL MEDICINE
Payer: MEDICARE

## 2024-09-12 VITALS
RESPIRATION RATE: 18 BRPM | HEART RATE: 96 BPM | OXYGEN SATURATION: 98 % | TEMPERATURE: 98.5 F | DIASTOLIC BLOOD PRESSURE: 66 MMHG | SYSTOLIC BLOOD PRESSURE: 119 MMHG

## 2024-09-12 DIAGNOSIS — R74.8 ELEVATED LIPASE: ICD-10-CM

## 2024-09-12 DIAGNOSIS — K85.90 PANCREATITIS: Primary | ICD-10-CM

## 2024-09-12 DIAGNOSIS — K66.8 PNEUMOPERITONEUM: ICD-10-CM

## 2024-09-12 DIAGNOSIS — R74.01 TRANSAMINITIS: ICD-10-CM

## 2024-09-12 LAB
ALBUMIN SERPL BCG-MCNC: 4.7 G/DL (ref 3.5–5)
ALP SERPL-CCNC: 187 U/L (ref 34–104)
ALT SERPL W P-5'-P-CCNC: 420 U/L (ref 7–52)
ANION GAP SERPL CALCULATED.3IONS-SCNC: 9 MMOL/L (ref 4–13)
AST SERPL W P-5'-P-CCNC: 338 U/L (ref 13–39)
ATRIAL RATE: 103 BPM
BASOPHILS # BLD AUTO: 0.02 THOUSANDS/ΜL (ref 0–0.1)
BASOPHILS NFR BLD AUTO: 0 % (ref 0–1)
BILIRUB SERPL-MCNC: 1.94 MG/DL (ref 0.2–1)
BUN SERPL-MCNC: 9 MG/DL (ref 5–25)
CALCIUM SERPL-MCNC: 9.9 MG/DL (ref 8.4–10.2)
CHLORIDE SERPL-SCNC: 101 MMOL/L (ref 96–108)
CO2 SERPL-SCNC: 28 MMOL/L (ref 21–32)
CREAT SERPL-MCNC: 0.59 MG/DL (ref 0.6–1.3)
EOSINOPHIL # BLD AUTO: 0.06 THOUSAND/ΜL (ref 0–0.61)
EOSINOPHIL NFR BLD AUTO: 1 % (ref 0–6)
ERYTHROCYTE [DISTWIDTH] IN BLOOD BY AUTOMATED COUNT: 12 % (ref 11.6–15.1)
GFR SERPL CREATININE-BSD FRML MDRD: 124 ML/MIN/1.73SQ M
GLUCOSE SERPL-MCNC: 125 MG/DL (ref 65–140)
HCT VFR BLD AUTO: 38.9 % (ref 34.8–46.1)
HGB BLD-MCNC: 13.1 G/DL (ref 11.5–15.4)
IMM GRANULOCYTES # BLD AUTO: 0.04 THOUSAND/UL (ref 0–0.2)
IMM GRANULOCYTES NFR BLD AUTO: 0 % (ref 0–2)
LIPASE SERPL-CCNC: ABNORMAL U/L (ref 11–82)
LYMPHOCYTES # BLD AUTO: 1.98 THOUSANDS/ΜL (ref 0.6–4.47)
LYMPHOCYTES NFR BLD AUTO: 18 % (ref 14–44)
MCH RBC QN AUTO: 31.6 PG (ref 26.8–34.3)
MCHC RBC AUTO-ENTMCNC: 33.7 G/DL (ref 31.4–37.4)
MCV RBC AUTO: 94 FL (ref 82–98)
MONOCYTES # BLD AUTO: 0.72 THOUSAND/ΜL (ref 0.17–1.22)
MONOCYTES NFR BLD AUTO: 7 % (ref 4–12)
NEUTROPHILS # BLD AUTO: 8.16 THOUSANDS/ΜL (ref 1.85–7.62)
NEUTS SEG NFR BLD AUTO: 74 % (ref 43–75)
NRBC BLD AUTO-RTO: 0 /100 WBCS
P AXIS: 67 DEGREES
PLATELET # BLD AUTO: 326 THOUSANDS/UL (ref 149–390)
PMV BLD AUTO: 10.7 FL (ref 8.9–12.7)
POTASSIUM SERPL-SCNC: 3.3 MMOL/L (ref 3.5–5.3)
PR INTERVAL: 122 MS
PROT SERPL-MCNC: 8.1 G/DL (ref 6.4–8.4)
QRS AXIS: 79 DEGREES
QRSD INTERVAL: 78 MS
QT INTERVAL: 362 MS
QTC INTERVAL: 474 MS
RBC # BLD AUTO: 4.15 MILLION/UL (ref 3.81–5.12)
SODIUM SERPL-SCNC: 138 MMOL/L (ref 135–147)
T WAVE AXIS: 55 DEGREES
VENTRICULAR RATE: 103 BPM
WBC # BLD AUTO: 10.98 THOUSAND/UL (ref 4.31–10.16)

## 2024-09-12 PROCEDURE — 93010 ELECTROCARDIOGRAM REPORT: CPT | Performed by: STUDENT IN AN ORGANIZED HEALTH CARE EDUCATION/TRAINING PROGRAM

## 2024-09-12 PROCEDURE — 85025 COMPLETE CBC W/AUTO DIFF WBC: CPT | Performed by: PHYSICIAN ASSISTANT

## 2024-09-12 PROCEDURE — 96361 HYDRATE IV INFUSION ADD-ON: CPT

## 2024-09-12 PROCEDURE — 96376 TX/PRO/DX INJ SAME DRUG ADON: CPT

## 2024-09-12 PROCEDURE — 36415 COLL VENOUS BLD VENIPUNCTURE: CPT | Performed by: PHYSICIAN ASSISTANT

## 2024-09-12 PROCEDURE — 96374 THER/PROPH/DIAG INJ IV PUSH: CPT

## 2024-09-12 PROCEDURE — 93005 ELECTROCARDIOGRAM TRACING: CPT

## 2024-09-12 PROCEDURE — 74177 CT ABD & PELVIS W/CONTRAST: CPT

## 2024-09-12 PROCEDURE — 83690 ASSAY OF LIPASE: CPT | Performed by: PHYSICIAN ASSISTANT

## 2024-09-12 PROCEDURE — 80053 COMPREHEN METABOLIC PANEL: CPT | Performed by: PHYSICIAN ASSISTANT

## 2024-09-12 PROCEDURE — 99284 EMERGENCY DEPT VISIT MOD MDM: CPT

## 2024-09-12 PROCEDURE — 96375 TX/PRO/DX INJ NEW DRUG ADDON: CPT

## 2024-09-12 PROCEDURE — 99285 EMERGENCY DEPT VISIT HI MDM: CPT | Performed by: PHYSICIAN ASSISTANT

## 2024-09-12 RX ORDER — POTASSIUM CHLORIDE 1500 MG/1
40 TABLET, EXTENDED RELEASE ORAL ONCE
Status: COMPLETED | OUTPATIENT
Start: 2024-09-12 | End: 2024-09-12

## 2024-09-12 RX ORDER — KETOROLAC TROMETHAMINE 30 MG/ML
15 INJECTION, SOLUTION INTRAMUSCULAR; INTRAVENOUS ONCE
Status: COMPLETED | OUTPATIENT
Start: 2024-09-12 | End: 2024-09-12

## 2024-09-12 RX ORDER — ONDANSETRON 2 MG/ML
4 INJECTION INTRAMUSCULAR; INTRAVENOUS ONCE
Status: COMPLETED | OUTPATIENT
Start: 2024-09-12 | End: 2024-09-12

## 2024-09-12 RX ADMIN — MORPHINE SULFATE 2 MG: 2 INJECTION, SOLUTION INTRAMUSCULAR; INTRAVENOUS at 22:26

## 2024-09-12 RX ADMIN — MORPHINE SULFATE 2 MG: 2 INJECTION, SOLUTION INTRAMUSCULAR; INTRAVENOUS at 18:19

## 2024-09-12 RX ADMIN — SODIUM CHLORIDE 1000 ML: 0.9 INJECTION, SOLUTION INTRAVENOUS at 15:55

## 2024-09-12 RX ADMIN — IOHEXOL 100 ML: 350 INJECTION, SOLUTION INTRAVENOUS at 17:25

## 2024-09-12 RX ADMIN — ONDANSETRON 4 MG: 2 INJECTION INTRAMUSCULAR; INTRAVENOUS at 15:55

## 2024-09-12 RX ADMIN — KETOROLAC TROMETHAMINE 15 MG: 30 INJECTION, SOLUTION INTRAMUSCULAR; INTRAVENOUS at 15:55

## 2024-09-12 RX ADMIN — POTASSIUM CHLORIDE 40 MEQ: 1500 TABLET, EXTENDED RELEASE ORAL at 18:15

## 2024-09-12 NOTE — ED PROVIDER NOTES
"1. Pancreatitis    2. Pneumoperitoneum    3. Transaminitis    4. Elevated lipase      ED Disposition       ED Disposition   Transfer to Another Facility-In Network    Condition   --    Date/Time   Thu Sep 12, 2024  8:42 PM    Comment   Gi Monroe should be transferred out to [facility] .               Assessment & Plan       Medical Decision Making  29-year-old female presenting for evaluation of epigastric abdominal pain.    EKG without evidence of acute ischemia or malignant dysrhythmia at this time.   Work-up to include blood work, CBC as marker of infectivity, hemoconcentration for hydration status, CMP to check for electrolytes, renal function, liver function, Lipase to check for pancreatitis. Workup reveals --elevated LFTs, much higher than prior testing therefore CT imaging was obtained to evaluate for potential retained stone versus intra-abdominal inflammation/surgical emergency.    The abdomen and pelvis demonstrates -pneumoperitoneum and pancreatitis with elevated lipase and transaminitis.  Patient was accepted for internal medicine at [facility] transfer.     All imaging and/or lab testing discussed with patient. Patient and/or family members verbalizes understanding and agrees with plan for admission. Patient is stable for admission.     Portions of the record may have been created with voice recognition software. Occasional wrong word or \"sound a like\" substitutions may have occurred due to the inherent limitations of voice recognition software. Read the chart carefully and recognize, using context, where substitutions have occurred.    Amount and/or Complexity of Data Reviewed  Labs: ordered. Decision-making details documented in ED Course.  Radiology: ordered.    Risk  Prescription drug management.              Medications   morphine injection 2 mg (has no administration in time range)   sodium chloride 0.9 % bolus 1,000 mL (0 mL Intravenous Stopped 9/12/24 9079) "   ondansetron (ZOFRAN) injection 4 mg (4 mg Intravenous Given 9/12/24 1555)   ketorolac (TORADOL) injection 15 mg (15 mg Intravenous Given 9/12/24 1555)   iohexol (OMNIPAQUE) 350 MG/ML injection (MULTI-DOSE) 100 mL (100 mL Intravenous Given 9/12/24 1725)   potassium chloride (Klor-Con M20) CR tablet 40 mEq (40 mEq Oral Given 9/12/24 1815)   morphine injection 2 mg (2 mg Intravenous Given 9/12/24 1819)       History of Present Illness       29-year-old female 22 days postop from a lap delon presenting for evaluation of epigastric abdominal pain which began after she lifted her child reports she did have some nausea in association earlier this morning took Tylenol and ibuprofen without complete resolution of symptoms prompting her presentation to the ER today.  Patient denies fevers chills, diarrhea, constipation.  She reports no coughing congestion, shortness of breath.  She reports the epigastric pain did radiate up into her chest however is localized to the mid epigastrium at this time          Review of Systems      Objective     ED Triage Vitals   Temperature Pulse Blood Pressure Respirations SpO2 Patient Position - Orthostatic VS   09/12/24 1505 09/12/24 1505 09/12/24 1505 09/12/24 1505 09/12/24 1505 09/12/24 1715   98.5 °F (36.9 °C) (!) 108 127/88 (!) 26 97 % Lying      Temp Source Heart Rate Source BP Location FiO2 (%) Pain Score    09/12/24 1505 09/12/24 1715 09/12/24 1715 -- 09/12/24 1505    Oral Monitor Right arm  8        Physical Exam    Labs Reviewed   CBC AND DIFFERENTIAL - Abnormal       Result Value    WBC 10.98 (*)     RBC 4.15      Hemoglobin 13.1      Hematocrit 38.9      MCV 94      MCH 31.6      MCHC 33.7      RDW 12.0      MPV 10.7      Platelets 326      nRBC 0      Segmented % 74      Immature Grans % 0      Lymphocytes % 18      Monocytes % 7      Eosinophils Relative 1      Basophils Relative 0      Absolute Neutrophils 8.16 (*)     Absolute Immature Grans 0.04      Absolute Lymphocytes 1.98       Absolute Monocytes 0.72      Eosinophils Absolute 0.06      Basophils Absolute 0.02     COMPREHENSIVE METABOLIC PANEL - Abnormal    Sodium 138      Potassium 3.3 (*)     Chloride 101      CO2 28      ANION GAP 9      BUN 9      Creatinine 0.59 (*)     Glucose 125      Calcium 9.9       (*)      (*)     Alkaline Phosphatase 187 (*)     Total Protein 8.1      Albumin 4.7      Total Bilirubin 1.94 (*)     eGFR 124      Narrative:     National Kid[employer] Disease Foundation guidelines for Chronic Kidney Disease (CKD):     Stage 1 with normal or high GFR (GFR > 90 mL/min/1.73 square meters)    Stage 2 Mild CKD (GFR = 60-89 mL/min/1.73 square meters)    Stage 3A Moderate CKD (GFR = 45-59 mL/min/1.73 square meters)    Stage 3B Moderate CKD (GFR = 30-44 mL/min/1.73 square meters)    Stage 4 Severe CKD (GFR = 15-29 mL/min/1.73 square meters)    Stage 5 End Stage CKD (GFR <15 mL/min/1.73 square meters)  Note: GFR calculation is accurate only with a steady state creatinine   LIPASE - Abnormal    Lipase 10,027 (*)      CT abdomen pelvis with contrast   Final Interpretation by Hayden Portillo MD (09/12 1803)      Interstitial edematous pancreatitis.      Small quantity of pneumoperitoneum and small quantity of gallbladder fossa fluid in keeping with the history of recent cholecystectomy.      The study was marked in EPIC for immediate notification.         Workstation performed: [id number]             ECG 12 Lead Documentation Only    Date/Time: 9/12/2024 3:40 PM    Performed by: Rebekah Cotton PA-C  Authorized by: Rebekah Cotton PA-C    Indications / Diagnosis:  Abd pain  ECG reviewed by me, the ED Provider: yes    Patient location:  ED  Previous ECG:     Comparison to cardiac monitor: Yes    Interpretation:     Interpretation: normal    Rate:     ECG rate:  103    ECG rate assessment: tachycardic    Rhythm:     Rhythm: sinus tachycardia    Ectopy:     Ectopy: none    QRS:      QRS axis:  Normal    QRS intervals:  Normal  Conduction:     Conduction: normal    ST segments:     ST segments:  Normal  T waves:     T waves: normal    Comments:        QRS 78               Rebekah Cotton PA-C  09/12/24 2208

## 2024-09-13 PROBLEM — K85.10 ACUTE BILIARY PANCREATITIS WITHOUT INFECTION OR NECROSIS: Status: ACTIVE | Noted: 2024-09-13

## 2024-09-13 PROBLEM — R74.01 TRANSAMINITIS: Status: ACTIVE | Noted: 2024-09-13

## 2024-09-13 LAB
ALBUMIN SERPL BCG-MCNC: 3.9 G/DL (ref 3.5–5)
ALP SERPL-CCNC: 164 U/L (ref 34–104)
ALT SERPL W P-5'-P-CCNC: 392 U/L (ref 7–52)
AMYLASE SERPL-CCNC: 557 IU/L (ref 29–103)
ANION GAP SERPL CALCULATED.3IONS-SCNC: 5 MMOL/L (ref 4–13)
AST SERPL W P-5'-P-CCNC: 193 U/L (ref 13–39)
BASOPHILS # BLD AUTO: 0.03 THOUSANDS/ΜL (ref 0–0.1)
BASOPHILS NFR BLD AUTO: 0 % (ref 0–1)
BILIRUB SERPL-MCNC: 1.1 MG/DL (ref 0.2–1)
BNP SERPL-MCNC: 17 PG/ML (ref 0–100)
BUN SERPL-MCNC: 9 MG/DL (ref 5–25)
CALCIUM SERPL-MCNC: 8.8 MG/DL (ref 8.4–10.2)
CHLORIDE SERPL-SCNC: 111 MMOL/L (ref 96–108)
CO2 SERPL-SCNC: 24 MMOL/L (ref 21–32)
CREAT SERPL-MCNC: 0.47 MG/DL (ref 0.6–1.3)
EOSINOPHIL # BLD AUTO: 0.08 THOUSAND/ΜL (ref 0–0.61)
EOSINOPHIL NFR BLD AUTO: 1 % (ref 0–6)
ERYTHROCYTE [DISTWIDTH] IN BLOOD BY AUTOMATED COUNT: 12.3 % (ref 11.6–15.1)
EST. AVERAGE GLUCOSE BLD GHB EST-MCNC: 100 MG/DL
GFR SERPL CREATININE-BSD FRML MDRD: 133 ML/MIN/1.73SQ M
GLUCOSE SERPL-MCNC: 96 MG/DL (ref 65–140)
HAV IGM SER QL: NORMAL
HBA1C MFR BLD: 5.1 %
HBV CORE IGM SER QL: NORMAL
HBV SURFACE AG SER QL: NORMAL
HCT VFR BLD AUTO: 35.3 % (ref 34.8–46.1)
HCV AB SER QL: NORMAL
HGB BLD-MCNC: 11.9 G/DL (ref 11.5–15.4)
IMM GRANULOCYTES # BLD AUTO: 0.03 THOUSAND/UL (ref 0–0.2)
IMM GRANULOCYTES NFR BLD AUTO: 0 % (ref 0–2)
LIPASE SERPL-CCNC: 1564 U/L (ref 11–82)
LYMPHOCYTES # BLD AUTO: 2.34 THOUSANDS/ΜL (ref 0.6–4.47)
LYMPHOCYTES NFR BLD AUTO: 24 % (ref 14–44)
MAGNESIUM SERPL-MCNC: 2.1 MG/DL (ref 1.9–2.7)
MCH RBC QN AUTO: 32.1 PG (ref 26.8–34.3)
MCHC RBC AUTO-ENTMCNC: 33.7 G/DL (ref 31.4–37.4)
MCV RBC AUTO: 95 FL (ref 82–98)
MONOCYTES # BLD AUTO: 0.47 THOUSAND/ΜL (ref 0.17–1.22)
MONOCYTES NFR BLD AUTO: 5 % (ref 4–12)
NEUTROPHILS # BLD AUTO: 6.95 THOUSANDS/ΜL (ref 1.85–7.62)
NEUTS SEG NFR BLD AUTO: 70 % (ref 43–75)
NRBC BLD AUTO-RTO: 0 /100 WBCS
PHOSPHATE SERPL-MCNC: 3 MG/DL (ref 2.7–4.5)
PLATELET # BLD AUTO: 281 THOUSANDS/UL (ref 149–390)
PLATELET # BLD AUTO: 297 THOUSANDS/UL (ref 149–390)
PMV BLD AUTO: 10.7 FL (ref 8.9–12.7)
PMV BLD AUTO: 11.1 FL (ref 8.9–12.7)
POTASSIUM SERPL-SCNC: 3.7 MMOL/L (ref 3.5–5.3)
PROT SERPL-MCNC: 6.7 G/DL (ref 6.4–8.4)
RBC # BLD AUTO: 3.71 MILLION/UL (ref 3.81–5.12)
SODIUM SERPL-SCNC: 140 MMOL/L (ref 135–147)
TRIGL SERPL-MCNC: 75 MG/DL
WBC # BLD AUTO: 9.9 THOUSAND/UL (ref 4.31–10.16)

## 2024-09-13 PROCEDURE — 84478 ASSAY OF TRIGLYCERIDES: CPT

## 2024-09-13 PROCEDURE — 84100 ASSAY OF PHOSPHORUS: CPT | Performed by: INTERNAL MEDICINE

## 2024-09-13 PROCEDURE — 99223 1ST HOSP IP/OBS HIGH 75: CPT | Performed by: INTERNAL MEDICINE

## 2024-09-13 PROCEDURE — 83735 ASSAY OF MAGNESIUM: CPT | Performed by: INTERNAL MEDICINE

## 2024-09-13 PROCEDURE — 83036 HEMOGLOBIN GLYCOSYLATED A1C: CPT

## 2024-09-13 PROCEDURE — G0379 DIRECT REFER HOSPITAL OBSERV: HCPCS

## 2024-09-13 PROCEDURE — 86015 ACTIN ANTIBODY EACH: CPT

## 2024-09-13 PROCEDURE — 82150 ASSAY OF AMYLASE: CPT | Performed by: INTERNAL MEDICINE

## 2024-09-13 PROCEDURE — 85025 COMPLETE CBC W/AUTO DIFF WBC: CPT | Performed by: INTERNAL MEDICINE

## 2024-09-13 PROCEDURE — 99232 SBSQ HOSP IP/OBS MODERATE 35: CPT

## 2024-09-13 PROCEDURE — 80053 COMPREHEN METABOLIC PANEL: CPT | Performed by: INTERNAL MEDICINE

## 2024-09-13 PROCEDURE — 80074 ACUTE HEPATITIS PANEL: CPT

## 2024-09-13 PROCEDURE — 83880 ASSAY OF NATRIURETIC PEPTIDE: CPT | Performed by: INTERNAL MEDICINE

## 2024-09-13 PROCEDURE — 83690 ASSAY OF LIPASE: CPT | Performed by: INTERNAL MEDICINE

## 2024-09-13 PROCEDURE — 85049 AUTOMATED PLATELET COUNT: CPT | Performed by: INTERNAL MEDICINE

## 2024-09-13 RX ORDER — SODIUM CHLORIDE 9 MG/ML
125 INJECTION, SOLUTION INTRAVENOUS CONTINUOUS
Status: DISCONTINUED | OUTPATIENT
Start: 2024-09-13 | End: 2024-09-14 | Stop reason: HOSPADM

## 2024-09-13 RX ORDER — POTASSIUM CHLORIDE 14.9 MG/ML
20 INJECTION INTRAVENOUS
Status: COMPLETED | OUTPATIENT
Start: 2024-09-13 | End: 2024-09-13

## 2024-09-13 RX ORDER — ENOXAPARIN SODIUM 100 MG/ML
40 INJECTION SUBCUTANEOUS DAILY
Status: DISCONTINUED | OUTPATIENT
Start: 2024-09-13 | End: 2024-09-13

## 2024-09-13 RX ADMIN — POTASSIUM CHLORIDE 20 MEQ: 14.9 INJECTION, SOLUTION INTRAVENOUS at 07:29

## 2024-09-13 RX ADMIN — SODIUM CHLORIDE 125 ML/HR: 0.9 INJECTION, SOLUTION INTRAVENOUS at 00:45

## 2024-09-13 RX ADMIN — MORPHINE SULFATE 2 MG: 2 INJECTION, SOLUTION INTRAMUSCULAR; INTRAVENOUS at 06:39

## 2024-09-13 RX ADMIN — POTASSIUM CHLORIDE 20 MEQ: 14.9 INJECTION, SOLUTION INTRAVENOUS at 05:35

## 2024-09-13 RX ADMIN — SODIUM CHLORIDE 125 ML/HR: 0.9 INJECTION, SOLUTION INTRAVENOUS at 08:50

## 2024-09-13 NOTE — PLAN OF CARE
Problem: PAIN - ADULT  Goal: Verbalizes/displays adequate comfort level or baseline comfort level  Description: Interventions:  - Encourage patient to monitor pain and request assistance  - Assess pain using appropriate pain scale  - Administer analgesics based on type and severity of pain and evaluate response  - Implement non-pharmacological measures as appropriate and evaluate response  - Consider cultural and social influences on pain and pain management  - Notify physician/advanced practitioner if interventions unsuccessful or patient reports new pain  Outcome: Progressing     Problem: INFECTION - ADULT  Goal: Absence or prevention of progression during hospitalization  Description: INTERVENTIONS:  - Assess and monitor for signs and symptoms of infection  - Monitor lab/diagnostic results  - Monitor all insertion sites, i.e. indwelling lines, tubes, and drains  - Monitor endotracheal if appropriate and nasal secretions for changes in amount and color  - [place] appropriate cooling/warming therapies per order  - Administer medications as ordered  - Instruct and encourage patient and family to use good hand hygiene technique  - Identify and instruct in appropriate isolation precautions for identified infection/condition  Outcome: Progressing  Goal: Absence of fever/infection during neutropenic period  Description: INTERVENTIONS:  - Monitor WBC    Outcome: Progressing     Problem: SAFETY ADULT  Goal: Patient will remain free of falls  Description: INTERVENTIONS:  - Educate patient/family on patient safety including physical limitations  - Instruct patient to call for assistance with activity   - Consult OT/PT to assist with strengthening/mobility   - Keep Call bell within reach  - Keep bed low and locked with side rails adjusted as appropriate  - Keep care items and personal belongings within reach  - Initiate and maintain comfort rounds  - Make Fall Risk Sign visible to staff  - Offer Toileting every 2 Hours,  in advance of need  - Initiate/Maintain bed alarm  - Obtain necessary fall risk management equipment: socks   - Apply yellow socks and bracelet for high fall risk patients  - Consider moving patient to room near nurses station  Outcome: Progressing  Goal: Maintain or return to baseline ADL function  Description: INTERVENTIONS:  -  Assess patient's ability to carry out ADLs; assess patient's baseline for ADL function and identify physical deficits which impact ability to perform ADLs (bathing, care of mouth/teeth, toileting, grooming, dressing, etc.)  - Assess/evaluate cause of self-care deficits   - Assess range of motion  - Assess patient's mobility; develop plan if impaired  - Assess patient's need for assistive devices and provide as appropriate  - Encourage maximum independence but intervene and supervise when necessary  - Involve family in performance of ADLs  - Assess for home care needs following discharge   - Consider OT consult to assist with ADL evaluation and planning for discharge  - Provide patient education as appropriate  Outcome: Progressing  Goal: Maintains/Returns to pre admission functional level  Description: INTERVENTIONS:  - Perform AM-PAC 6 Click Basic Mobility/ Daily Activity assessment daily.  - Set and communicate daily mobility goal to care team and patient/family/caregiver.   - Collaborate with rehabilitation services on mobility goals if consulted  - Perform Range of Motion 4 times a day.  - Reposition patient every 2 hours.  - Dangle patient 3 times a day  - Stand patient 3 times a day  - Ambulate patient 3 times a day  - Out of bed to chair 3 times a day   - Out of bed for meals 3 times a day  - Out of bed for toileting  - Record patient progress and toleration of activity level   Outcome: Progressing     Problem: DISCHARGE PLANNING  Goal: Discharge to home or other facility with appropriate resources  Description: INTERVENTIONS:  - Identify barriers to discharge w/patient and  caregiver  - Arrange for needed discharge resources and transportation as appropriate  - Identify discharge learning needs (meds, wound care, etc.)  - Arrange for interpretive services to assist at discharge as needed  - Refer to Case Management Department for coordinating discharge planning if the patient needs post-hospital services based on physician/advanced practitioner order or complex needs related to functional status, cognitive ability, or social support system  Outcome: Progressing     Problem: Knowledge Deficit  Goal: Patient/family/caregiver demonstrates understanding of disease process, treatment plan, medications, and discharge instructions  Description: Complete learning assessment and assess knowledge base.  Interventions:  - Provide teaching at level of understanding  - Provide teaching via preferred learning methods  Outcome: Progressing

## 2024-09-13 NOTE — H&P
Assessment & Plan  Acute biliary pancreatitis without infection or necrosis  -Possibly secondary to retained CBD stone  -LFTs significantly elevated with an ALT of 420 alk phos of 187 and AST of 3.38  -Possible need for MRCP  -GI and surgical consults placed  -Will place n.p.o.  -As needed morphine for pain  -Lipase elevated to 10,000 initially  -CAT scan shows findings of acute interstitial edematous pancreatitis.  Small amount of pneumoperitoneum possibly postop secondary to recent cholecystectomy.  No comment on CBD per report  -Hydrate with normal saline at 125 an hour  -No evidence of necrosis or collection on CAT scan    DVT prophylaxis Lovenox 40 a day      Chief Complaint:   Abdominal pain    History of Present Illness:    Gi Monroe is a 29 y.o. female who presents with patient status post lap delon 22 days ago she is postop.  Comes in with epigastric abdominal pain.  She says the pain has been worsening over the last few weeks status post the procedure.  Pain is significant with some radiation into the back.  No fevers no chills no diarrhea no other constitutional symptoms.  Presents to outside ER at [facility] CAT scan they are done showed acute edematous pancreatitis.  Lipase was significantly elevated to the 71914's.  No significant biliary ductal dilatation commented on per report.  Has been transferred to [facility] surgical service spoken with.  As well as GI.  Likely need for MRCP.  Resting in bed comfortably at this point pain is persistent somewhat improved after morphine.  No significant evidence of necrosis or collection on CAT scan.  Patient says pain somewhat radiates into the chest also.  Review of Systems:    All other systems reviewed and negative    Past Medical and Surgical History:     Past Medical History:   Diagnosis Date    No known health problems        Past Surgical History:   Procedure Laterality Date    [place] LAPAROSCOPY SURG CHOLECYSTECTOMY N/A 8/21/2024    Procedure:  CHOLECYSTECTOMY LAPAROSCOPIC  ROBOTIC ASSISTED;  Surgeon: Jia Ge MD;  Location: [facility];  Service: General    SOFT TISSUE CYST EXCISION      head-2023       Meds/Allergies:    Prior to Admission medications    Medication Sig Start Date End Date Taking? Authorizing Provider   acetaminophen (TYLENOL) 325 mg tablet Take 3 tablets (975 mg total) by mouth every 8 (eight) hours 3/11/24   Mala Thapa PA-C   etonogestrel (Nexplanon) subdermal implant Inject 1 each under the skin    Historical Provider, MD   naproxen (Naprosyn) 500 mg tablet Take 1 tablet (500 mg total) by mouth 2 (two) times a day as needed for mild pain 5/11/24   Mya Ashby PA-C       Allergies: No Known Allergies    Social History:     Marital Status: Single     Social History     Substance and Sexual Activity   Alcohol Use No     Social History     Tobacco Use   Smoking Status Never    Passive exposure: Never   Smokeless Tobacco Never     Social History     Substance and Sexual Activity   Drug Use No       Family History:    Reviewed. Noncontributing    Physical Exam:     Vitals:   Blood Pressure: 122/74 (09/12/24 2307)  Pulse: 82 (09/12/24 2305)  Temperature: 98.6 °F (37 °C) (09/12/24 2305)  SpO2: 96 % (09/12/24 2305)    Physical Exam:      Vitals:   Blood Pressure: 151/83 (01/05/24 1655)  Pulse: 88 (01/05/24 1655)  Temperature: 98.2 °F (36.8 °C) (01/05/24 1114)  Temp Source: Oral (01/05/24 1114)  Respirations: 18 (01/05/24 1655)  SpO2: 92 % (01/05/24 1655)     Physical Exam significant abdominal pain to mild to moderate palpation diffusely  Vitals and nursing note reviewed.   Constitutional:       General: She is not in acute distress.    HENT:      Right Ear: Ear canal normal. There is no impacted cerumen.      Left Ear: Ear canal normal. There is no impacted cerumen.      Ears:      Mouth/Throat:      Mouth: Mucous membranes are moist.   Eyes:      General: No scleral icterus.     Extraocular Movements: Extraocular movements  intact.      Pupils: Pupils are equal, round, and reactive to light.   Neck:      Vascular: No carotid bruit.   Cardiovascular:      Rate and Rhythm: Normal rate and regular rhythm.      Pulses: Normal pulses.      Heart sounds: Normal heart sounds. No murmur heard.     No friction rub. No gallop.   Pulmonary:      Effort: Pulmonary effort is normal. No respiratory distress.      Breath sounds: No wheezing or rhonchi.   Abdominal:      General: Abdomen is flat. There is no distension.      Tenderness: There is no abdominal tenderness. There is no right CVA tenderness or left CVA tenderness.   Musculoskeletal:         General: No swelling or tenderness. Normal range of motion.      Cervical back: Normal range of motion. No tenderness.      Right lower leg: No edema.      Left lower leg: No edema.   Lymphadenopathy:      Cervical: No cervical adenopathy.   Skin:     General: Skin is warm and dry.   Neurological:      General: No focal deficit present.      Mental Status: She is alert and oriented to person, place, and time.      Cranial Nerves: No cranial nerve deficit.      Sensory: No sensory deficit.      Motor: No weakness.       Additional Data:     Lab Results: Reviewed radiology reports from this admission including: CT abdomen/pelvis.    Results from last 7 days   Lab Units 09/13/24  0055 09/12/24  1553   WBC Thousand/uL  --  10.98*   HEMOGLOBIN g/dL  --  13.1   HEMATOCRIT %  --  38.9   PLATELETS Thousands/uL 297 326   SEGS PCT %  --  74   LYMPHO PCT %  --  18   MONO PCT %  --  7   EOS PCT %  --  1     Results from last 7 days   Lab Units 09/12/24  1553   SODIUM mmol/L 138   POTASSIUM mmol/L 3.3*   CHLORIDE mmol/L 101   CO2 mmol/L 28   BUN mg/dL 9   CREATININE mg/dL 0.59*   ANION GAP mmol/L 9   CALCIUM mg/dL 9.9   ALBUMIN g/dL 4.7   TOTAL BILIRUBIN mg/dL 1.94*   ALK PHOS U/L 187*   ALT U/L 420*   AST U/L 338*   GLUCOSE RANDOM mg/dL 125                       Imaging:     No orders to display               **  Please Note: This note has been constructed using a voice recognition system. **

## 2024-09-13 NOTE — ASSESSMENT & PLAN NOTE
Patient with history of recent laparoscopic cholecystectomy by Dr. Ge 8/21 presents with worsening epigastric pain, exacerbated approximately 1 hour after eating without relief  CT abdomen with interstitial edematous pancreatitis, lipase 10,027  Also with transaminitis and hyperbilirubinemia which are now downtrending  Question if secondary to retained CBD stone versus passing stone?  Continue IVF, pain control as needed  Will discuss with GI for further imaging right upper quad ultrasound versus MRCP  N.p.o. status changed to clear liquid  Patient is not a drinker.  Triglycerides normal.

## 2024-09-13 NOTE — PROGRESS NOTES
Progress Note - Hospitalist   Name: Gi Monroe 29 y.o. female I MRN: [id number]  Unit/Bed#: E5 -01 I Date of Admission: 9/12/2024   Date of Service: 9/13/2024 I Hospital Day: 1    Assessment & Plan  Acute biliary pancreatitis without infection or necrosis  Patient with history of recent laparoscopic cholecystectomy by Dr. Ge 8/21 presents with worsening epigastric pain, exacerbated approximately 1 hour after eating without relief  CT abdomen with interstitial edematous pancreatitis, lipase 10,027  Also with transaminitis and hyperbilirubinemia which are now downtrending  Question if secondary to retained CBD stone versus passing stone?  Continue IVF, pain control as needed  Will discuss with GI for further imaging right upper quad ultrasound versus MRCP  N.p.o. status changed to clear liquid  Patient is not a drinker.  Triglycerides normal.  BMI 29.0-29.9,adult  Follow-up A1c. Contributes to all aspects of care  Transaminitis  ,  on admission  Downtrending ,   Question if there is a retained CBD stone versus passing stone?  Patient denies consistent Tylenol use, no RUQ pain nausea or vomiting  Follow-up formal GI recommendations    VTE Pharmacologic Prophylaxis:   Encourage ambulation    Mobility:   Basic Mobility Inpatient Raw Score: 24  JH-HLM Goal: 8: Walk 250 feet or more  JH-HLM Achieved: 7: Walk 25 feet or more  JH-HLM Goal achieved. Continue to encourage appropriate mobility.    Patient Centered Rounds: I performed bedside rounds with nursing staff today.   Discussions with Specialists or Other Care Team Provider: GI    Education and Discussions with Family / Patient: Patient declined call to .     Current Length of Stay: 1 day(s)  Current Patient Status: Inpatient   Certification Statement: The patient will continue to require additional inpatient hospital stay due to pancreatitis  Discharge Plan: Anticipate discharge in 24-48 hrs to home.    Code  Status: Level 1 - Full Code    Subjective   Patient seen and examined lying in bed.  Reports her upper abdominal pain is improving.  She has been dealing with intermittent upper abdominal pain since her gallbladder removal.  Yesterday it was a 10 and today it is a 3.   She noted the pain started about an hour after she ate a meal around 10:00 and she had sharp stabbing pain into her chest.  She took a Tylenol and that did not relieve this pain.  She had radiating sharp pain into her chest which is now resolved.  Denies consistent Tylenol use.  Denies alcohol use.  Denies issues with blood sugar or cholesterol.  Never had this issue before.  She denies any chest pain, shortness of breath, nausea, vomiting or diarrhea.  No lower leg swelling. She does admit to having a small appetite.    Objective     Vitals:   Temp (24hrs), Av.5 °F (36.9 °C), Min:98.3 °F (36.8 °C), Max:98.6 °F (37 °C)    Temp:  [98.3 °F (36.8 °C)-98.6 °F (37 °C)] 98.3 °F (36.8 °C)  HR:  [] 88  Resp:  [14-26] 14  BP: (101-127)/(52-88) 112/85  SpO2:  [96 %-98 %] 96 %  There is no height or weight on file to calculate BMI.     Input and Output Summary (last 24 hours):     Intake/Output Summary (Last 24 hours) at 2024 1349  Last data filed at 2024 0850  Gross per 24 hour   Intake --   Output 425 ml   Net -425 ml       Physical Exam  Constitutional:       General: She is not in acute distress.     Appearance: She is obese. She is not toxic-appearing.   Cardiovascular:      Rate and Rhythm: Normal rate and regular rhythm.      Pulses: Normal pulses.      Heart sounds: Normal heart sounds.   Pulmonary:      Effort: Pulmonary effort is normal. No respiratory distress.      Breath sounds: Normal breath sounds.   Abdominal:      General: Bowel sounds are normal. There is no distension.      Palpations: Abdomen is soft.      Tenderness: There is abdominal tenderness (epigastric). There is no guarding or rebound.      Comments: Laparoscopic  scars on abdomen well-healed   Musculoskeletal:      Right lower leg: No edema.      Left lower leg: No edema.   Skin:     Coloration: Skin is not jaundiced.   Neurological:      Mental Status: She is oriented to person, place, and time. Mental status is at baseline.        Lines/Drains:  Lines/Drains/Airways       Active Status       None                    Lab Results: I have reviewed the following results:  Results from last 7 days   Lab Units 09/13/24  0531   WBC Thousand/uL 9.90   HEMOGLOBIN g/dL 11.9   HEMATOCRIT % 35.3   PLATELETS Thousands/uL 281   SEGS PCT % 70   LYMPHO PCT % 24   MONO PCT % 5   EOS PCT % 1     Results from last 7 days   Lab Units 09/13/24  0531   SODIUM mmol/L 140   POTASSIUM mmol/L 3.7   CHLORIDE mmol/L 111*   CO2 mmol/L 24   BUN mg/dL 9   CREATININE mg/dL 0.47*   ANION GAP mmol/L 5   CALCIUM mg/dL 8.8   ALBUMIN g/dL 3.9   TOTAL BILIRUBIN mg/dL 1.10*   ALK PHOS U/L 164*   ALT U/L 392*   AST U/L 193*   GLUCOSE RANDOM mg/dL 96     Recent Cultures (last 7 days):     Last 24 Hours Medication List:     Current Facility-Administered Medications:     morphine injection 2 mg, Q6H PRN    sodium chloride 0.9 % infusion, Continuous, Last Rate: 125 mL/hr (09/13/24 0850)    Administrative Statements   Today, Patient Was Seen By: Shana Pope PA-C    **Please Note: This note may have been constructed using a voice recognition system.**

## 2024-09-13 NOTE — PLAN OF CARE
Problem: PAIN - ADULT  Goal: Verbalizes/displays adequate comfort level or baseline comfort level  Description: Interventions:  - Encourage patient to monitor pain and request assistance  - Assess pain using appropriate pain scale  - Administer analgesics based on type and severity of pain and evaluate response  - Implement non-pharmacological measures as appropriate and evaluate response  - Consider cultural and social influences on pain and pain management  - Notify physician/advanced practitioner if interventions unsuccessful or patient reports new pain  Outcome: Progressing     Problem: INFECTION - ADULT  Goal: Absence or prevention of progression during hospitalization  Description: INTERVENTIONS:  - Assess and monitor for signs and symptoms of infection  - Monitor lab/diagnostic results  - Monitor all insertion sites, i.e. indwelling lines, tubes, and drains  - Monitor endotracheal if appropriate and nasal secretions for changes in amount and color  - H[place]el appropriate cooling/warming therapies per order  - Administer medications as ordered  - Instruct and encourage patient and family to use good hand hygiene technique  - Identify and instruct in appropriate isolation precautions for identified infection/condition  Outcome: Progressing  Goal: Absence of fever/infection during neutropenic period  Description: INTERVENTIONS:  - Monitor WBC    Outcome: Progressing     Problem: SAFETY ADULT  Goal: Patient will remain free of falls  Description: INTERVENTIONS:  - Educate patient/family on patient safety including physical limitations  - Instruct patient to call for assistance with activity   - Consult OT/PT to assist with strengthening/mobility   - Keep Call bell within reach  - Keep bed low and locked with side rails adjusted as appropriate  - Keep care items and personal belongings within reach  - Initiate and maintain comfort rounds  - Make Fall Risk Sign visible to staff  - Offer Toileting every 2 Hours,  in advance of need  - Initiate/Maintain bedalarm  - Obtain necessary fall risk management equipment:   - Apply yellow socks and bracelet for high fall risk patients  - Consider moving patient to room near nurses station  Outcome: Progressing  Goal: Maintain or return to baseline ADL function  Description: INTERVENTIONS:  -  Assess patient's ability to carry out ADLs; assess patient's baseline for ADL function and identify physical deficits which impact ability to perform ADLs (bathing, care of mouth/teeth, toileting, grooming, dressing, etc.)  - Assess/evaluate cause of self-care deficits   - Assess range of motion  - Assess patient's mobility; develop plan if impaired  - Assess patient's need for assistive devices and provide as appropriate  - Encourage maximum independence but intervene and supervise when necessary  - Involve family in performance of ADLs  - Assess for home care needs following discharge   - Consider OT consult to assist with ADL evaluation and planning for discharge  - Provide patient education as appropriate  Outcome: Progressing  Goal: Maintains/Returns to pre admission functional level  Description: INTERVENTIONS:  - Perform AM-PAC 6 Click Basic Mobility/ Daily Activity assessment daily.  - Set and communicate daily mobility goal to care team and patient/family/caregiver.   - Collaborate with rehabilitation services on mobility goals if consulted  - Perform Range of Motion 3 times a day.  - Reposition patient every 2 hours.  - Dangle patient 3 times a day  - Stand patient 3 times a day  - Ambulate patient 3 times a day  - Out of bed to chair 3 times a day   - Out of bed for meals 3 times a day  - Out of bed for toileting  - Record patient progress and toleration of activity level   Outcome: Progressing     Problem: DISCHARGE PLANNING  Goal: Discharge to home or other facility with appropriate resources  Description: INTERVENTIONS:  - Identify barriers to discharge w/patient and caregiver  -  Arrange for needed discharge resources and transportation as appropriate  - Identify discharge learning needs (meds, wound care, etc.)  - Arrange for interpretive services to assist at discharge as needed  - Refer to Case Management Department for coordinating discharge planning if the patient needs post-hospital services based on physician/advanced practitioner order or complex needs related to functional status, cognitive ability, or social support system  Outcome: Progressing     Problem: Knowledge Deficit  Goal: Patient/family/caregiver demonstrates understanding of disease process, treatment plan, medications, and discharge instructions  Description: Complete learning assessment and assess knowledge base.  Interventions:  - Provide teaching at level of understanding  - Provide teaching via preferred learning methods  Outcome: Progressing

## 2024-09-13 NOTE — CONSULTS
Progress Note -  [facility]  Gi Monroe 29 y.o. female MRN: [id number]  Unit/Bed#: E5 -01 Encounter: [id number]      ASSESSMENT AND PLAN:      The patient is a 29-year-old female with history of lap delon 22 days ago presented with epigastric pain rating to the back, lipase 10,000 initially, CT showing acute pancreatitis    Acute pancreatitis  history of lap delon 22 days ago presented with epigastric pain rating to the back, lipase 10,000 initially, CT showing acute pancreatitis no calculi ;seen in the CBD  LFTs elevated AST initially 338-193; ->392; T. bili 1.92->1.10; calcium triglycerides within normal limits  Given 1 L bolus in the ED followed by 125 normal saline made n.p.o.  Findings may be secondary to passed gallstone  Low risk for complications based on BISAP and [place] pancreatitis scoring  Continue IV fluids  Can advance diet as tolerated  Pain management per primary team      Elevated LFTs  LFTs elevated AST initially 338-193; ->392; T. bili 1.92->1.10; calcium triglycerides within normal limits  Findings may be secondary to passed gallstone  We will further workup with hepatitis panel and autoimmune workup  Plan for MRCP if LFTs fail to improve    Rest of care per primary team.    ______________________________________________________________________    Subjective:      The patient is a 29-year-old female with history of lap delon 22 days ago presented with epigastric pain rating to the back, lipase 10,000 initially, CT showing acute pancreatitis transferred  from [place] to  [place].    She states since her surgery she was having intermittent episodes of abdominal pain without nausea however for the past couple days has been consistent.  She denies any alcohol use.  She states she has an appetite and would like to eat.  She endorses chest pain and epigastric pain.  She denies any fevers, chills, nausea, vomiting, diarrhea, constipation.      REVIEW  OF SYSTEMS:    Review of Systems   Constitutional:  Negative for chills, fatigue and fever.   Respiratory:  Negative for cough, shortness of breath and wheezing.    Cardiovascular:  Positive for chest pain. Negative for leg swelling.   Gastrointestinal:  Positive for abdominal pain. Negative for abdominal distention, constipation, diarrhea, nausea and vomiting.   Genitourinary:  Negative for difficulty urinating.   Neurological:  Negative for dizziness.          Historical Information   Past Medical History:   Diagnosis Date    No known health problems      Past Surgical History:   Procedure Laterality Date    [place] LAPAROSCOPY SURG CHOLECYSTECTOMY N/A 8/21/2024    Procedure: CHOLECYSTECTOMY LAPAROSCOPIC  ROBOTIC ASSISTED;  Surgeon: Jia Ge MD;  Location: [facility] OR;  Service: General    SOFT TISSUE CYST EXCISION      head-2023     Social History   Social History     Substance and Sexual Activity   Alcohol Use No     Social History     Substance and Sexual Activity   Drug Use No     Social History     Tobacco Use   Smoking Status Never    Passive exposure: Never   Smokeless Tobacco Never     Family History   Problem Relation Age of Onset    No Known Problems Mother     No Known Problems Father        Meds/Allergies       Medications Prior to Admission:     acetaminophen (TYLENOL) 325 mg tablet    etonogestrel (Nexplanon) subdermal implant    naproxen (Naprosyn) 500 mg tablet    Current Facility-Administered Medications:     morphine injection 2 mg, Q6H PRN    sodium chloride 0.9 % infusion, Continuous, Last Rate: 125 mL/hr (09/13/24 0850)    No Known Allergies        Objective     Blood pressure 112/85, pulse 88, temperature 98.3 °F (36.8 °C), temperature source Oral, resp. rate 14, last menstrual period 08/02/2024, SpO2 96%, not currently breastfeeding. There is no height or weight on file to calculate BMI.      Intake/Output Summary (Last 24 hours) at 9/13/2024 1614  Last data filed at 9/13/2024 0850  Gross  per 24 hour   Intake --   Output 425 ml   Net -425 ml         PHYSICAL EXAM:      Physical Exam  Constitutional:       General: She is not in acute distress.     Appearance: She is not ill-appearing or toxic-appearing.   Cardiovascular:      Rate and Rhythm: Normal rate and regular rhythm.      Heart sounds: No murmur heard.     No gallop.   Pulmonary:      Effort: No respiratory distress.      Breath sounds: No wheezing.   Abdominal:      General: Abdomen is flat. There is no distension.      Tenderness: There is abdominal tenderness. There is no guarding.   Musculoskeletal:      Right lower leg: No edema.      Left lower leg: No edema.   Neurological:      Mental Status: She is alert and oriented to person, place, and time.   Psychiatric:         Mood and Affect: Mood normal.         Behavior: Behavior normal.          Lab Results:   Admission on 09/12/2024   Component Date Value    BNP 09/13/2024 17     Amylase 09/13/2024 557 (H)     Lipase 09/13/2024 1,564 (H)     Platelets 09/13/2024 297     MPV 09/13/2024 10.7     WBC 09/13/2024 9.90     RBC 09/13/2024 3.71 (L)     Hemoglobin 09/13/2024 11.9     Hematocrit 09/13/2024 35.3     MCV 09/13/2024 95     MCH 09/13/2024 32.1     MCHC 09/13/2024 33.7     RDW 09/13/2024 12.3     MPV 09/13/2024 11.1     Platelets 09/13/2024 281     nRBC 09/13/2024 0     Segmented % 09/13/2024 70     Immature Grans % 09/13/2024 0     Lymphocytes % 09/13/2024 24     Monocytes % 09/13/2024 5     Eosinophils Relative 09/13/2024 1     Basophils Relative 09/13/2024 0     Absolute Neutrophils 09/13/2024 6.95     Absolute Immature Grans 09/13/2024 0.03     Absolute Lymphocytes 09/13/2024 2.34     Absolute Monocytes 09/13/2024 0.47     Eosinophils Absolute 09/13/2024 0.08     Basophils Absolute 09/13/2024 0.03     Sodium 09/13/2024 140     Potassium 09/13/2024 3.7     Chloride 09/13/2024 111 (H)     CO2 09/13/2024 24     ANION GAP 09/13/2024 5     BUN 09/13/2024 9     Creatinine 09/13/2024 0.47  (L)     Glucose 09/13/2024 96     Calcium 09/13/2024 8.8     AST 09/13/2024 193 (H)     ALT 09/13/2024 392 (H)     Alkaline Phosphatase 09/13/2024 164 (H)     Total Protein 09/13/2024 6.7     Albumin 09/13/2024 3.9     Total Bilirubin 09/13/2024 1.10 (H)     eGFR 09/13/2024 133     Phosphorus 09/13/2024 3.0     Magnesium 09/13/2024 2.1     Hemoglobin A1C 09/13/2024 5.1     EAG 09/13/2024 100     Triglycerides 09/13/2024 75        Imaging Studies: I have personally reviewed pertinent imaging studies.    Mika Almeida DO  [facility] Internal Medicine PGY-3

## 2024-09-13 NOTE — EMTALA/ACUTE CARE TRANSFER
[facility] EMERGENCY DEPARTMENT  [address] [address]  [phone]  Dept: [phone]      EMTALA TRANSFER CONSENT    NAME Gi Monroe                                         1995                              MRN [id number]    I have been informed of my rights regarding examination, treatment, and transfer   by Dr. Donell Henry MD    Benefits: Specialized equipment and/or services available at the receiving facility (Include comment)________________________    Risks:        Consent for Transfer:  I acknowledge that my medical condition has been evaluated and explained to me by the emergency department physician or other qualified medical person and/or my attending physician, who has recommended that I be transferred to the service of  Accepting Physician:  at Accepting Facility Name, City & State : [facility]. The above potential benefits of such transfer, the potential risks associated with such transfer, and the probable risks of not being transferred have been explained to me, and I fully understand them.  The doctor has explained that, in my case, the benefits of transfer outweigh the risks.  I agree to be transferred.    I authorize the performance of emergency medical procedures and treatments upon me in both transit and upon arrival at the receiving facility.  Additionally, I authorize the release of any and all medical records to the receiving facility and request they be transported with me, if possible.  I understand that the safest mode of transportation during a medical emergency is an ambulance and that the Hospital advocates the use of this mode of transport. Risks of traveling to the receiving facility by car, including absence of medical control, life sustaining equipment, such as oxygen, and medical personnel has been explained to me and I fully understand them.    (ROSITA CORRECT BOX BELOW)  [  ]  I consent to the stated  transfer and to be transported by ambulance/helicopter.  [  ]  I consent to the stated transfer, but refuse transportation by ambulance and accept full responsibility for my transportation by car.  I understand the risks of non-ambulance transfers and I exonerate the Hospital and its staff from any deterioration in my condition that results from this refusal.    X___________________________________________    DATE  24  TIME________  Signature of patient or legally responsible individual signing on patient behalf           RELATIONSHIP TO PATIENT_________________________          Provider Certification    NAME Gi Monroe                                         1995                              MRN [id number]    A medical screening exam was performed on the above named patient.  Based on the examination:    Condition Necessitating Transfer The primary encounter diagnosis was Pancreatitis. Diagnoses of Pneumoperitoneum, Transaminitis, and Elevated lipase were also pertinent to this visit.    Patient Condition: The patient has been stabilized such that within reasonable medical probability, no material deterioration of the patient condition or the condition of the unborn child(katherine) is likely to result from the transfer    Reason for Transfer: Level of Care needed not available at this facility    Transfer Requirements: Facility [facility]   Space available and qualified personnel available for treatment as acknowledged by [facility] PACS  [phone]  Agreed to accept transfer and to provide appropriate medical treatment as acknowledged by         Appropriate medical records of the examination and treatment of the patient are provided at the time of transfer   STAFF INITIAL WHEN COMPLETED _______  Transfer will be performed by qualified personnel from [facility]  and appropriate transfer equipment as required, including the use of necessary and appropriate life support measures.    Provider  Certification: I have examined the patient and explained the following risks and benefits of being transferred/refusing transfer to the patient/family:  General risk, such as traffic hazards, adverse weather conditions, rough terrain or turbulence, possible failure of equipment (including vehicle or aircraft), or consequences of actions of persons outside the control of the transport personnel      Based on these reasonable risks and benefits to the patient and/or the unborn child(katherine), and based upon the information available at the time of the patient’s examination, I certify that the medical benefits reasonably to be expected from the provision of appropriate medical treatments at another medical facility outweigh the increasing risks, if any, to the individual’s medical condition, and in the case of labor to the unborn child, from effecting the transfer.    X____________________________________________ DATE 09/12/24        TIME_______      ORIGINAL - SEND TO MEDICAL RECORDS   COPY - SEND WITH PATIENT DURING TRANSFER

## 2024-09-13 NOTE — ASSESSMENT & PLAN NOTE
-Possibly secondary to retained CBD stone  -LFTs significantly elevated with an ALT of 420 alk phos of 187 and AST of 3.38  -Possible need for MRCP  -GI and surgical consults placed  -Will place n.p.o.  -As needed morphine for pain  -Lipase elevated to 10,000 initially  -CAT scan shows findings of acute interstitial edematous pancreatitis.  Small amount of pneumoperitoneum possibly postop secondary to recent cholecystectomy.  No comment on CBD per report  -Hydrate with normal saline at 125 an hour  -No evidence of necrosis or collection on CAT scan    DVT prophylaxis Lovenox 40 a day

## 2024-09-13 NOTE — ASSESSMENT & PLAN NOTE
,  on admission  Downtrending ,   Question if there is a retained CBD stone versus passing stone?  Patient denies consistent Tylenol use, no RUQ pain nausea or vomiting  Follow-up formal GI recommendations

## 2024-09-13 NOTE — CASE MANAGEMENT
Case Management Assessment & Discharge Planning Note    Patient name Gi Monroe  Location East 5 /E5 -* MRN [id number]  : 1995 Date 2024       Current Admission Date: 2024  Current Admission Diagnosis:Acute biliary pancreatitis without infection or necrosis   Patient Active Problem List    Diagnosis Date Noted Date Diagnosed    Acute biliary pancreatitis without infection or necrosis 2024     BMI 29.0-29.9,adult 2024     Transaminitis 2024     Simple ovarian cyst 2024       LOS (days): 1  Geometric Mean LOS (GMLOS) (days):   Days to GMLOS:     OBJECTIVE:    Risk of Unplanned Readmission Score: 5.74      Current admission status: Inpatient    Preferred Pharmacy:   [id number] [place] - [address]  [address] [facility] [address]  Phone: [phone] Fax: [phone]    [facility] [place] - [place]  [address]  [facility],  [address] [place] [address],  [address] [facility] [address]  Phone: [phone] Fax: [phone]    Primary Care Provider: [facility]    Primary Insurance: [employer] [facility]  Secondary Insurance:     ASSESSMENT:  Active Health Care Proxies    There are no active Health Care Proxies on file.       Readmission Root Cause  30 Day Readmission: No    Patient Information  Admitted from:: Home  Mental Status: Alert  During Assessment patient was accompanied by: Spouse  Assessment information provided by:: Patient  Primary Caregiver: Self  Support Systems: Spouse/significant other, Parent  County of Residence: [place]  What [facility] do you live in?: [place]  Home entry access options. Select all that apply.: Stairs  Number of steps to enter home.: 2  Do the steps have railings?: Yes  Type of Current Residence: [address] [place] [address] home  Upon entering residence, is there a bedroom on the main floor (no further steps)?: No  A bedroom is located on the following floor levels of  residence (select all that apply):: 2nd Floor  Upon entering residence, is there a bathroom on the main floor (no further steps)?: Yes  Number of steps to 2nd floor from main floor: One Flight  Living Arrangements: Lives w/ Son, Lives w/ Daughter  Is patient a ?: No    Activities of Daily Living Prior to Admission  Functional Status: Independent  Completes ADLs independently?: Yes  Ambulates independently?: Yes  Does patient use assisted devices?: No  Does patient currently own DME?: No  Does patient have a history of Outpatient Therapy (PT/OT)?: No  Does the patient have a history of Short-Term Rehab?: No  Does patient have a history of HHC?: No  Does patient currently have HHC?: No    Patient Information Continued  Income Source: Unknown  Does patient have prescription coverage?: Yes  Does patient receive dialysis treatments?: No  Does patient have a history of substance abuse?: No  Does patient have a history of Mental Health Diagnosis?: No    Means of Transportation  Means of Transport to Appts:: Drives Self    Social Determinants of Health (SDOH)      Flowsheet Row Most Recent Value   Housing Stability    In the last 12 months, was there a time when you were not able to pay the mortgage or rent on time? N   In the past 12 months, how many times have you moved where you were living? 0   At any time in the past 12 months, were you homeless or living in a shelter (including now)? N   Transportation Needs    In the past 12 months, has lack of transportation kept you from medical appointments or from getting medications? no   In the past 12 months, has lack of transportation kept you from meetings, work, or from getting things needed for daily living? No   Food Insecurity    Within the past 12 months, you worried that your food would run out before you got the money to buy more. Never true   Within the past 12 months, the food you bought just didn't last and you didn't have money to get more. Never true    Utilities    In the past 12 months has the electric, gas, oil, or water company threatened to shut off services in your home? No          DISCHARGE DETAILS:    Discharge planning discussed with:: Patient and SO Deniz  Freedom of Choice: Yes        Additional Comments: Met with patient and SO at bedside and completed CM assessment.  CM department following thru discharge

## 2024-09-13 NOTE — ASSESSMENT & PLAN NOTE
,  on admission  Downtrending  --> 70,  --> 246  Suspect secondary to passing stone  Acute hepatitis panel negative  Patient denies consistent Tylenol use, no RUQ pain nausea or vomiting  CMP in 1 week with PCP  GI sent anti-smooth muscle antibodies, those are pending on discharge

## 2024-09-14 VITALS
RESPIRATION RATE: 14 BRPM | TEMPERATURE: 98.1 F | HEART RATE: 84 BPM | SYSTOLIC BLOOD PRESSURE: 112 MMHG | OXYGEN SATURATION: 96 % | DIASTOLIC BLOOD PRESSURE: 66 MMHG

## 2024-09-14 LAB
ALBUMIN SERPL BCG-MCNC: 3.9 G/DL (ref 3.5–5)
ALP SERPL-CCNC: 140 U/L (ref 34–104)
ALT SERPL W P-5'-P-CCNC: 246 U/L (ref 7–52)
ANION GAP SERPL CALCULATED.3IONS-SCNC: 6 MMOL/L (ref 4–13)
AST SERPL W P-5'-P-CCNC: 70 U/L (ref 13–39)
BILIRUB SERPL-MCNC: 1.05 MG/DL (ref 0.2–1)
BUN SERPL-MCNC: 9 MG/DL (ref 5–25)
CALCIUM SERPL-MCNC: 8.8 MG/DL (ref 8.4–10.2)
CHLORIDE SERPL-SCNC: 108 MMOL/L (ref 96–108)
CO2 SERPL-SCNC: 25 MMOL/L (ref 21–32)
CREAT SERPL-MCNC: 0.44 MG/DL (ref 0.6–1.3)
ERYTHROCYTE [DISTWIDTH] IN BLOOD BY AUTOMATED COUNT: 12.1 % (ref 11.6–15.1)
GFR SERPL CREATININE-BSD FRML MDRD: 136 ML/MIN/1.73SQ M
GLUCOSE SERPL-MCNC: 95 MG/DL (ref 65–140)
HCT VFR BLD AUTO: 35 % (ref 34.8–46.1)
HGB BLD-MCNC: 11.6 G/DL (ref 11.5–15.4)
MAGNESIUM SERPL-MCNC: 2 MG/DL (ref 1.9–2.7)
MCH RBC QN AUTO: 32.2 PG (ref 26.8–34.3)
MCHC RBC AUTO-ENTMCNC: 33.1 G/DL (ref 31.4–37.4)
MCV RBC AUTO: 97 FL (ref 82–98)
PHOSPHATE SERPL-MCNC: 2.8 MG/DL (ref 2.7–4.5)
PLATELET # BLD AUTO: 264 THOUSANDS/UL (ref 149–390)
PMV BLD AUTO: 10.8 FL (ref 8.9–12.7)
POTASSIUM SERPL-SCNC: 3.5 MMOL/L (ref 3.5–5.3)
PROT SERPL-MCNC: 6.7 G/DL (ref 6.4–8.4)
RBC # BLD AUTO: 3.6 MILLION/UL (ref 3.81–5.12)
SODIUM SERPL-SCNC: 139 MMOL/L (ref 135–147)
WBC # BLD AUTO: 9.19 THOUSAND/UL (ref 4.31–10.16)

## 2024-09-14 PROCEDURE — 80053 COMPREHEN METABOLIC PANEL: CPT | Performed by: INTERNAL MEDICINE

## 2024-09-14 PROCEDURE — 99232 SBSQ HOSP IP/OBS MODERATE 35: CPT | Performed by: STUDENT IN AN ORGANIZED HEALTH CARE EDUCATION/TRAINING PROGRAM

## 2024-09-14 PROCEDURE — 99239 HOSP IP/OBS DSCHRG MGMT >30: CPT

## 2024-09-14 PROCEDURE — 84100 ASSAY OF PHOSPHORUS: CPT | Performed by: INTERNAL MEDICINE

## 2024-09-14 PROCEDURE — 83735 ASSAY OF MAGNESIUM: CPT | Performed by: INTERNAL MEDICINE

## 2024-09-14 PROCEDURE — 99024 POSTOP FOLLOW-UP VISIT: CPT | Performed by: SURGERY

## 2024-09-14 PROCEDURE — 85027 COMPLETE CBC AUTOMATED: CPT

## 2024-09-14 RX ORDER — KETOROLAC TROMETHAMINE 30 MG/ML
15 INJECTION, SOLUTION INTRAMUSCULAR; INTRAVENOUS EVERY 6 HOURS PRN
Status: DISCONTINUED | OUTPATIENT
Start: 2024-09-14 | End: 2024-09-14 | Stop reason: HOSPADM

## 2024-09-14 RX ADMIN — KETOROLAC TROMETHAMINE 15 MG: 30 INJECTION, SOLUTION INTRAMUSCULAR; INTRAVENOUS at 15:10

## 2024-09-14 NOTE — ASSESSMENT & PLAN NOTE
Steadily improving.    - agree with monitoring steady downtrend  - defer to GI management for further imaging recs

## 2024-09-14 NOTE — NURSING NOTE
Discharge instructions reviewed with pt. She is aware of medication changes and prescriptions to be picked up. Pt has no questions or concerns, family is providing ride home.

## 2024-09-14 NOTE — UTILIZATION REVIEW
Initial Clinical Review    Admission: Date/Time/Statement:   Admission Orders (From admission, onward)       Ordered        09/13/24 0018  Inpatient Admission  Once            09/13/24 0018  INPATIENT ADMISSION  Once                          Orders Placed This Encounter   Procedures    INPATIENT ADMISSION     Standing Status:   Standing     Number of Occurrences:   1     Order Specific Question:   Level of Care     Answer:   Med Surg [16]     Order Specific Question:   Estimated length of stay     Answer:   More than 2 Midnights     Order Specific Question:   Certification     Answer:   I certify that inpatient services are medically necessary for this patient for a duration of greater than two midnights. See H&P and MD Progress Notes for additional information about the patient's course of treatment.    Inpatient Admission     Standing Status:   Standing     Number of Occurrences:   1     Order Specific Question:   Level of Care     Answer:   Med Surg [16]     Order Specific Question:   Estimated length of stay     Answer:   More than 2 Midnights     Order Specific Question:   Certification     Answer:   I certify that inpatient services are medically necessary for this patient for a duration of greater than two midnights. See H&P and MD Progress Notes for additional information about the patient's course of treatment.           Initial Presentation: 29 y.o. female transferred from  ED to [facility] admitted Inpatient d/t Acute biliary pancreatitis without infection or necrosis.status post lap delon 22 days ago she is postop. Comes in with epigastric abdominal pain. She says the pain has been worsening over the last few weeks status post the procedure. Pain is significant with some radiation into the back.   LIPASE 10,027... T BILI 1.94.CREAT 0.59. K 3.3.WBC 10.98. CAT scan shows findings of acute interstitial edematous pancreatitis. Small amount of pneumoperitoneum possibly postop secondary to  recent cholecystectomy. No comment on CBD per report Possible need for MRCP.GI and surgical consults.NPO. IVF. IV analgesics.    9/13 GI consult:history of laparoscopic cholecystectomy 22 days ago and now presented with epigastric pain and elevated lipase and CT findings consistent with acute pancreatitis. Her liver enzymes are elevated and given the acute onset of symptoms and elevated liver enzymes this is concerning for common bile duct stone. Fortunately her symptoms and liver enzymes are improving so she probably passed the stone. We will monitor her liver enzymes and symptoms and if there is worsening then we will check an MRCP to evaluate for a retained common bile duct stone.IVF.NPO.Follow liver enzymes and if they increase we will plan for MRCP to rule out a common bile duct stone.    9/14 surgery consult:S/p laparoscopic cholecystectomy on 8/21/2024 with clinical improvement of epigastric discomfort and downtrending LFTs. no need for NPO from a surgical perspective, agree with slow advancement and low-fat diet       Date: 9/14   Day 2: Feels better. Pain resolved. Tolerated clears for breakfast this morning. Hungry for food. LIPASE 1564. AMYLASE 557. AST 70 ALT  246. T BILI 1.05. CREAT 0.44.LFTs continue to downtrend, so we can hold off on MRCP.IVF.    ED Triage Vitals   Temperature Pulse Respirations Blood Pressure SpO2 Pain Score   09/12/24 2305 09/12/24 2305 09/13/24 0734 09/12/24 2307 09/12/24 2305 09/13/24 0017   98.6 °F (37 °C) 82 14 122/74 96 % 5     Weight (last 2 days)       None            Vital Signs (last 3 days)       Date/Time Temp Pulse Resp BP MAP (mmHg) SpO2 O2 Device Patient Position - Orthostatic VS Pain    09/14/24 08:11:19 98.1 °F (36.7 °C) 84 14 112/66 81 96 % None (Room air) Lying 2    09/14/24 0100 -- -- -- -- -- -- -- -- No Pain    09/13/24 23:04:26 98.5 °F (36.9 °C) 83 15 113/71 85 94 % None (Room air) Lying --    09/13/24 07:34:13 98.3 °F (36.8 °C) 88 14 112/85 76 96 % None (Room  air) Lying 3    09/13/24 0639 -- -- -- -- -- -- -- -- 7    09/13/24 0017 -- -- -- -- -- -- None (Room air) -- 5    09/12/24 2307 -- -- -- 122/74 86 -- -- Lying --    09/12/24 23:05:55 98.6 °F (37 °C) 82 -- -- -- 96 % -- -- --              Pertinent Labs/Diagnostic Test Results:   Radiology:  CT ABD (  ED):Interstitial edematous pancreatitis.     Small quantity of pneumoperitoneum and small quantity of gallbladder fossa fluid in keeping with the history of recent cholecystectomy.  Cardiology:  9/12 ( ED):Date/Time: 9/12/2024 3:40 PM     Performed by: Rebekah Cotton PA-C   Authorized by: Rebekah Cotton PA-C    Indications / Diagnosis:  Abd pain   ECG reviewed by me, the ED Provider: yes    Patient location:  ED   Previous ECG:     Comparison to cardiac monitor: Yes    Interpretation:     Interpretation: normal    Rate:     ECG rate:  103     ECG rate assessment: tachycardic    Rhythm:     Rhythm: sinus tachycardia    Ectopy:     Ectopy: none    QRS:     QRS axis:  Normal     QRS intervals:  Normal   Conduction:     Conduction: normal    ST segments:     ST segments:  Normal   T waves:     T waves: normal    Comments:         QRS 78           9/12( ED) EKG:  Sinus tachycardia  Otherwise normal ECG  When compared with ECG of 11-MAY-2024 00:49,  No significant change was found  Confirmed by Pablo Webster ([id number]) on 9/12/2024 3:39:05 PM         Results from last 7 days   Lab Units 09/14/24 0548 09/13/24  0531 09/13/24  0055 09/12/24  1553   WBC Thousand/uL 9.19 9.90  --  10.98*   HEMOGLOBIN g/dL 11.6 11.9  --  13.1   HEMATOCRIT % 35.0 35.3  --  38.9   PLATELETS Thousands/uL 264 281 297 326   TOTAL NEUT ABS Thousands/µL  --  6.95  --  8.16*         Results from last 7 days   Lab Units 09/14/24 0548 09/13/24  0531 09/12/24  1553   SODIUM mmol/L 139 140 138   POTASSIUM mmol/L 3.5 3.7 3.3*   CHLORIDE mmol/L 108 111* 101   CO2 mmol/L 25 24 28   ANION GAP mmol/L 6 5 9   BUN  mg/dL 9 9 9   CREATININE mg/dL 0.44* 0.47* 0.59*   EGFR ml/min/1.73sq m 136 133 124   CALCIUM mg/dL 8.8 8.8 9.9   MAGNESIUM mg/dL 2.0 2.1  --    PHOSPHORUS mg/dL 2.8 3.0  --      Results from last 7 days   Lab Units 09/14/24  0548 09/13/24  0531 09/12/24  1553   AST U/L 70* 193* 338*   ALT U/L 246* 392* 420*   ALK PHOS U/L 140* 164* 187*   TOTAL PROTEIN g/dL 6.7 6.7 8.1   ALBUMIN g/dL 3.9 3.9 4.7   TOTAL BILIRUBIN mg/dL 1.05* 1.10* 1.94*         Results from last 7 days   Lab Units 09/14/24  0548 09/13/24  0531 09/12/24  1553   GLUCOSE RANDOM mg/dL 95 96 125         Results from last 7 days   Lab Units 09/13/24  0531   HEMOGLOBIN A1C % 5.1   EAG mg/dl 100       Results from last 7 days   Lab Units 09/13/24  0055   BNP pg/mL 17                 Results from last 7 days   Lab Units 09/13/24  1815   HEP B S AG  Non-reactive   HEP C AB  Non-reactive   HEP B C IGM  Non-reactive     Results from last 7 days   Lab Units 09/13/24  0055 09/12/24  1553   LIPASE u/L 1,564* 10,027*   AMYLASE IU/L 557*  --        Past Medical History:   Diagnosis Date    No known health problems      Present on Admission:  **None**      Admitting Diagnosis: Pancreatitis [K85.90]  Age/Sex: 29 y.o. female  Admission Orders:  Scheduled Medications:     Continuous IV Infusions:  sodium chloride, 125 mL/hr, Intravenous, Continuous      PRN Meds:  ketorolac, 15 mg, Intravenous, Q6H PRN  morphine injection, 2 mg, Intravenous, Q6H PRN 9/13 X 1    GI LO FAT DIET  OOB  I&O  NPO ADV TO CLEAR LIQ DIET      IP CONSULT TO GASTROENTEROLOGY    Network Utilization Review Department  ATTENTION: Please call with any questions or concerns to [phone] and carefully listen to the prompts so that you are directed to the right person. All voicemails are confidential.   For Discharge needs, contact Care Management DC Support Team at [phone] opt. 2  Send all requests for admission clinical reviews, approved or denied determinations and any other requests to  dedicated fax number below belonging to the campus where the patient is receiving treatment. List of dedicated fax numbers for the Facilities:  FACILITY NAME UR FAX NUMBER   ADMISSION DENIALS (Administrative/Medical Necessity) [phone]   DISCHARGE SUPPORT TEAM (NETWORK) [phone]   PARENT CHILD HEALTH (Maternity/NICU/Pediatrics) [phone]   [facility] [phone]   [facility] [phone]   [facility] [phone]   [facility] [phone]   [facility] [phone]   [facility] [phone]   [facility] [phone]   [facility] [phone]   [facility] [phone]   [facility] [phone]   [facility] [phone]   [facility] [phone]

## 2024-09-14 NOTE — PROGRESS NOTES
Progress Note -  [facility]  Patient: Gi Monroe 29 y.o. female   MRN: [id number]  PCP: [facility]  Unit/Bed#: E5 -01 Encounter: [id number]  Length of Stay: 2 days    Assessment/Plan  Gi Monroe is a 29 y.o. female with a PMH of cholelithiasis and chronic cholecystitis s/p CCY 08/21/24 who presents with epigastric pain, fth acute panc.    # acute pancreatitis  # elevated LFTs, improving   - BISAP on admission 0   - no significant findings on CTAP. CBD/CHD appears to be 4-5mm at this point.   - given the recency of her surgery, suspect passage of retained stone vs microlithiasis. Other Ddx such as alcohol, hypertriglyceridemia, hypercalcemia, autoimmune, medication-induced less likely   - LFTs continue to downtrend, so we can hold off on MRCP    - acute VH panel neg. Pending ASMA   - she is tolerating clears and her symptoms have resolved. Okay to advance to low-fat diet today   - if she tolerates lunch, okay to discharge w/ repeat LFTs in about 1 week    Subjective:  Interval History:   Feels better. Pain resolved. Tolerated clears for breakfast this morning. Hungry for food.    Otherwise, pt denies any fevers/chills, lightheadedness, dizziness, CP, SOB, N/V/D/C, abdom pain, urinary symptoms, weakness/numbness/tingling.    ROS otherwise as covered in Interval History.    Objective:  /66 (BP Location: Right arm)   Pulse 84   Temp 98.1 °F (36.7 °C) (Oral)   Resp 14   LMP 08/02/2024 (Approximate)   SpO2 96%     No intake or output data in the 24 hours ending 09/14/24 1109    Physical Exam  Constitutional:       General: She is not in acute distress.     Appearance: Normal appearance.   HENT:      Head: Normocephalic and atraumatic.      Nose: Nose normal.      Mouth/Throat:      Mouth: Mucous membranes are moist.   Eyes:      General: No scleral icterus.     Extraocular Movements: Extraocular movements intact.      Conjunctiva/sclera: Conjunctivae  normal.      Pupils: Pupils are equal, round, and reactive to light.   Cardiovascular:      Rate and Rhythm: Normal rate and regular rhythm.   Pulmonary:      Effort: Pulmonary effort is normal.   Abdominal:      General: Abdomen is flat. There is no distension.      Palpations: There is no mass.      Tenderness: There is no abdominal tenderness.   Musculoskeletal:         General: No swelling. Normal range of motion.   Skin:     General: Skin is warm and dry.      Capillary Refill: Capillary refill takes less than 2 seconds.   Neurological:      General: No focal deficit present.      Mental Status: She is alert.   Psychiatric:         Mood and Affect: Mood normal.         Labs:  I have reviewed all available labs and imaging results in Epic.  Results from last 7 days   Lab Units 09/14/24  0548 09/13/24  0531 09/12/24  1553   SODIUM mmol/L 139 140 138   POTASSIUM mmol/L 3.5 3.7 3.3*   CHLORIDE mmol/L 108 111* 101   CO2 mmol/L 25 24 28   BUN mg/dL 9 9 9   CREATININE mg/dL 0.44* 0.47* 0.59*   GLUCOSE RANDOM mg/dL 95 96 125   CALCIUM mg/dL 8.8 8.8 9.9   ALBUMIN g/dL 3.9 3.9 4.7   ALK PHOS U/L 140* 164* 187*   ALT U/L 246* 392* 420*   AST U/L 70* 193* 338*   EGFR ml/min/1.73sq m 136 133 124     Results from last 7 days   Lab Units 09/14/24  0548 09/13/24  0531 09/13/24  0055 09/12/24  1553   WBC Thousand/uL 9.19 9.90  --  10.98*   HEMOGLOBIN g/dL 11.6 11.9  --  13.1   HEMATOCRIT % 35.0 35.3  --  38.9   PLATELETS Thousands/uL 264 281 297 326   SEGS PCT %  --  70  --  74   LYMPHO PCT %  --  24  --  18   MONO PCT %  --  5  --  7   EOS PCT %  --  1  --  1   BASOS PCT %  --  0  --  0   TOTAL NEUT ABS Thousands/µL  --  6.95  --  8.16*   LYMPHS ABS Thousands/µL  --  2.34  --  1.98   MONOS ABS Thousand/µL  --  0.47  --  0.72   EOS ABS Thousand/µL  --  0.08  --  0.06   BASOS ABS Thousands/µL  --  0.03  --  0.02         Results from last 7 days   Lab Units 09/13/24  0055   BNP pg/mL 17       Additional Labs:  Results from last 7  days   Lab Units 09/14/24  0548 09/13/24  0531 09/13/24  0055 09/12/24  1553   LIPASE u/L  --   --  1,564* 10,027*   MAGNESIUM mg/dL 2.0 2.1  --   --    PHOSPHORUS mg/dL 2.8 3.0  --   --           Results from last 7 days   Lab Units 09/13/24  0531   HEMOGLOBIN A1C % 5.1   TRIGLYCERIDES mg/dL 75       Imaging:  No orders to display       Medications:   Current Facility-Administered Medications   Medication Dose Route Frequency Provider Last Rate    ketorolac  15 mg Intravenous Q6H PRN Shana Pope PA-C      morphine injection  2 mg Intravenous Q6H PRN Williams Corona MD      sodium chloride  125 mL/hr Intravenous Continuous Williams Corona  mL/hr (09/13/24 0850)       Problem List:  Patient Active Problem List   Diagnosis    Simple ovarian cyst    Acute biliary pancreatitis without infection or necrosis    BMI 29.0-29.9,adult    Transaminitis       Case discussed with attending physician Dr. Cullen. All recs are preliminary pending final attestation.    Keaton Najera, PGY-5

## 2024-09-14 NOTE — PLAN OF CARE
Problem: PAIN - ADULT  Goal: Verbalizes/displays adequate comfort level or baseline comfort level  Description: Interventions:  - Encourage patient to monitor pain and request assistance  - Assess pain using appropriate pain scale  - Administer analgesics based on type and severity of pain and evaluate response  - Implement non-pharmacological measures as appropriate and evaluate response  - Consider cultural and social influences on pain and pain management  - Notify physician/advanced practitioner if interventions unsuccessful or patient reports new pain  Outcome: Progressing     Problem: INFECTION - ADULT  Goal: Absence or prevention of progression during hospitalization  Description: INTERVENTIONS:  - Assess and monitor for signs and symptoms of infection  - Monitor lab/diagnostic results  - Monitor all insertion sites, i.e. indwelling lines, tubes, and drains  - Monitor endotracheal if appropriate and nasal secretions for changes in amount and color  - [place] appropriate cooling/warming therapies per order  - Administer medications as ordered  - Instruct and encourage patient and family to use good hand hygiene technique  - Identify and instruct in appropriate isolation precautions for identified infection/condition  Outcome: Progressing  Goal: Absence of fever/infection during neutropenic period  Description: INTERVENTIONS:  - Monitor WBC    Outcome: Progressing     Problem: SAFETY ADULT  Goal: Patient will remain free of falls  Description: INTERVENTIONS:  - Educate patient/family on patient safety including physical limitations  - Instruct patient to call for assistance with activity   - Consult OT/PT to assist with strengthening/mobility   - Keep Call bell within reach  - Keep bed low and locked with side rails adjusted as appropriate  - Keep care items and personal belongings within reach  - Initiate and maintain comfort rounds  - Make Fall Risk Sign visible to staff  - Offer Toileting every 2 Hours,  in advance of need  - Obtain necessary fall risk management equipment  - Apply yellow socks and bracelet for high fall risk patients  - Consider moving patient to room near nurses station  Outcome: Progressing  Goal: Maintain or return to baseline ADL function  Description: INTERVENTIONS:  -  Assess patient's ability to carry out ADLs; assess patient's baseline for ADL function and identify physical deficits which impact ability to perform ADLs (bathing, care of mouth/teeth, toileting, grooming, dressing, etc.)  - Assess/evaluate cause of self-care deficits   - Assess range of motion  - Assess patient's mobility; develop plan if impaired  - Assess patient's need for assistive devices and provide as appropriate  - Encourage maximum independence but intervene and supervise when necessary  - Involve family in performance of ADLs  - Assess for home care needs following discharge   - Consider OT consult to assist with ADL evaluation and planning for discharge  - Provide patient education as appropriate  Outcome: Progressing  Goal: Maintains/Returns to pre admission functional level  Description: INTERVENTIONS:  - Perform AM-PAC 6 Click Basic Mobility/ Daily Activity assessment daily.  - Set and communicate daily mobility goal to care team and patient/family/caregiver.   - Collaborate with rehabilitation services on mobility goals if consulted  - Perform Range of Motion 3 times a day.  - Reposition patient every 2 hours.  - Dangle patient 3 times a day  - Stand patient 3 times a day  - Ambulate patient 3 times a day  - Out of bed to chair 3 times a day   - Out of bed for meals 3 times a day  - Out of bed for toileting  - Record patient progress and toleration of activity level   Outcome: Progressing     Problem: DISCHARGE PLANNING  Goal: Discharge to home or other facility with appropriate resources  Description: INTERVENTIONS:  - Identify barriers to discharge w/patient and caregiver  - Arrange for needed discharge  resources and transportation as appropriate  - Identify discharge learning needs (meds, wound care, etc.)  - Arrange for interpretive services to assist at discharge as needed  - Refer to Case Management Department for coordinating discharge planning if the patient needs post-hospital services based on physician/advanced practitioner order or complex needs related to functional status, cognitive ability, or social support system  Outcome: Progressing     Problem: Knowledge Deficit  Goal: Patient/family/caregiver demonstrates understanding of disease process, treatment plan, medications, and discharge instructions  Description: Complete learning assessment and assess knowledge base.  Interventions:  - Provide teaching at level of understanding  - Provide teaching via preferred learning methods  Outcome: Progressing

## 2024-09-14 NOTE — ASSESSMENT & PLAN NOTE
Appropriate for low-fat diet with slow advancement in setting of suspected acute biliary pancreatitis

## 2024-09-14 NOTE — PROGRESS NOTES
Progress Note - Surgery-General   Name: Gi Monroe 29 y.o. female I MRN: [id number]  Unit/Bed#: E5 -01 I Date of Admission: 9/12/2024   Date of Service: 9/14/2024 I Hospital Day: 2    Assessment & Plan  Acute biliary pancreatitis without infection or necrosis  S/p laparoscopic cholecystectomy on 8/21/2024 with clinical improvement of epigastric discomfort and downtrending LFTs.    - defer GI management to primary and GI colleagues, however agree with monitoring  - no need for NPO from a surgical perspective, agree with slow advancement and low-fat diet  - pain and nausea control per primary team  - remainder of care per primary team  - no need for outpatient follow-up with General Surgery team as patient's surgical sites appear to be healing well  BMI 29.0-29.9,adult  Appropriate for low-fat diet with slow advancement in setting of suspected acute biliary pancreatitis  Transaminitis  Steadily improving.    - agree with monitoring steady downtrend  - defer to GI management for further imaging recs    Please contact the SecureChat role for the [facility] Surgery-General service with any questions/concerns.    Michael Mitchell MD  General Surgery  09/14/24  11:26 AM      History of Present Illness   Gi Monroe is a 29 y.o. female w/ PMHx of symptomatic cholelithiasis and chronic cholecystitis s/p lap cholecystectomy on 8/21/2024 w/ Dr. Joo lynne Rosario presented to [facility] ED on 9/13 with epigastric pain & elevated transaminases with c/f acute biliary pancreatitis and was admitted to medicine team.  Consults to GI and surgery placed upon admission.  GI with high suspicion of retained stone however will hold off with MRCP given slow downtrend of LFTs.  Steady clinical improvement throughout admission with return of appetite.      Objective      Temp:  [98.1 °F (36.7 °C)-98.5 °F (36.9 °C)] 98.1 °F (36.7 °C)  HR:  [83-84] 84  Resp:  [14-15] 14  BP: (112-113)/(66-71) 112/66  O2 Device: None (Room air)          I/O          09/12 0701  09/13 0700 09/13 0701  09/14 0700 09/14 0701  09/15 0700    Urine  425     Total Output  425     Net  -425                  Lines/Drains/Airways       Active Status       None                  Physical Exam:  GENERAL APPEARANCE: well developed, well nourished female in NAD.  HEENT: NCAT; EOMI; normal external nose & ears; MMM  NECK: Supple, normal ROM.  CARDIOVASCULAR: Regular rate. Grossly well perfused.  LUNGS/CHEST: Symmetric chest rise/fall with respirations.  ABD: Soft; non-distended; non-tender.  Well-healing laparoscopic surgical incisions well-approximated without surrounding erythema, induration, or discharge  EXT: Normal ROM. No observable deformities in 4/4 extremities. No edema.  NEURO: AAOx4. No focal neurologic deficits. Distally neurovascularly intact.  SKIN: Warm, dry and well perfused; no rash; no jaundice.      Lab Results: I have reviewed the following results: CBC/BMP:   .     09/14/24  0548   WBC 9.19   HGB 11.6   HCT 35.0      SODIUM 139   K 3.5      CO2 25   BUN 9   CREATININE 0.44*   GLUC 95   MG 2.0   PHOS 2.8    , LFTs:   .     09/14/24  0548   AST 70*   *   ALB 3.9   TBILI 1.05*   ALKPHOS 140*    , Troponin,BNP:No new results in last 24 hours. , Lipid Profile:   Results from last 7 days   Lab Units 09/13/24  0531   TRIGLYCERIDES mg/dL 75     Imaging Review: Reviewed radiology reports from this admission including: CT abdomen/pelvis.  CT Abd/Pelvis W Contrast (9/12/2024):  IMPRESSION:  - Interstitial edematous pancreatitis.  - Small quantity of pneumoperitoneum and small quantity of gallbladder fossa fluid in keeping with the history of recent cholecystectomy.    Other Studies: EKG was reviewed.   EKG (9/12/2024): sinus tachycardia    VTE Pharmacologic Prophylaxis: VTE covered by:    None    VTE Mechanical Prophylaxis: sequential compression device

## 2024-09-14 NOTE — DISCHARGE SUMMARY
Discharge Summary - Hospitalist   Name: Gi Monroe 29 y.o. female I MRN: [id number]  Unit/Bed#: E5 -01 I Date of Admission: 9/12/2024   Date of Service: 9/14/2024 I Hospital Day: 2     Assessment & Plan  Acute biliary pancreatitis without infection or necrosis  Patient with history of recent laparoscopic cholecystectomy by Dr. Ge 8/21 presents with worsening epigastric pain, exacerbated approximately 1 hour after eating without relief  CT abdomen with interstitial edematous pancreatitis, lipase 10,027  Also with transaminitis and hyperbilirubinemia which are now downtrending  Seen and evaluated by GI and surgery.  Suspected due to passing stone  Patient has been given IVF and pain medications with improvement  Patient is not a drinker.  Triglycerides normal  Advanced diet slowly and tolerated low-fat diet. Clear for discharge home  Discussed with her the need for repeat blood work in 1 week with her PCP  Encouraged her to continue low-fat diet for the next week  Educated her to return to the ER if she has a following bowel limited to worsening abdominal pain, fevers, difficulty tolerating oral intake, vomiting  Can use OTC NSAIDs with food and water. Defer tylenol for time being due to transaminitis  BMI 29.0-29.9,adult  A1C normal. Contributes to all aspects of care  Transaminitis  ,  on admission  Downtrending  --> 70,  --> 246  Suspect secondary to passing stone  Acute hepatitis panel negative  Patient denies consistent Tylenol use, no RUQ pain nausea or vomiting  CMP in 1 week with PCP  GI sent anti-smooth muscle antibodies, those are pending on discharge     Medical Problems       Resolved Problems  Date Reviewed: 8/1/2024   None       Discharging Physician / Practitioner: Shana Pope PA-C  PCP: [facility]  Admission Date:   Admission Orders (From admission, onward)       Ordered        09/13/24 0018  Inpatient Admission  Once             09/13/24 0018  INPATIENT ADMISSION  Once                          Discharge Date: 09/14/24    Consultations During Hospital Stay:  GI  Surgery    Procedures Performed:   None    Significant Findings / Test Results:   CT abdomen pelvis with contrast  Result Date: 9/12/2024  Impression: Interstitial edematous pancreatitis. Small quantity of pneumoperitoneum and small quantity of gallbladder fossa fluid in keeping with the history of recent cholecystectomy.     Test Results Pending at Discharge (will require follow up):   Anti-smooth muscle antibody IgG     Outpatient Tests Requested:  Suggest CMP in 1 week    Complications: None    Reason for Admission: Acute pancreatitis    Hospital Course:   Gi Monroe is a 29 y.o. female patient who originally presented to the hospital on 9/12/2024 due to     Please see above list of diagnoses and related plan for additional information.     Condition at Discharge: good    Discharge Day Visit / Exam:   Subjective: Patient seen and examined this morning in afternoon.  She reports her stomach pain is improving and rates it a 2 out of 10.  She denies any nausea, vomiting or diarrhea.  No fevers, chills, chest pain or shortness of breath.  She had a clear liquid diet and has an appetite and was advanced to low-fat diet and tolerated well.  She is eager to go home.    Vitals: Blood Pressure: 112/66 (09/14/24 0811)  Pulse: 84 (09/14/24 0811)  Temperature: 98.1 °F (36.7 °C) (09/14/24 0811)  Temp Source: Oral (09/14/24 0811)  Respirations: 14 (09/14/24 0811)  SpO2: 96 % (09/14/24 0811)    Exam:   Physical Exam  Constitutional:       General: She is not in acute distress.     Appearance: Normal appearance. She is not ill-appearing, toxic-appearing or diaphoretic.   Cardiovascular:      Rate and Rhythm: Normal rate and regular rhythm.      Heart sounds: Normal heart sounds. No murmur heard.  Pulmonary:      Effort: Pulmonary effort is normal. No respiratory distress.      Breath  sounds: Normal breath sounds.   Abdominal:      General: Bowel sounds are normal. There is no distension.      Palpations: Abdomen is soft.      Tenderness: There is no guarding or rebound.      Comments: Mild epigastric tenderness to touch   Musculoskeletal:      Right lower leg: No edema.      Left lower leg: No edema.   Skin:     General: Skin is warm and dry.      Capillary Refill: Capillary refill takes less than 2 seconds.      Coloration: Skin is not jaundiced.   Neurological:      General: No focal deficit present.      Mental Status: She is alert and oriented to person, place, and time.   Psychiatric:         Mood and Affect: Mood normal.         Behavior: Behavior normal.          Discussion with Family: Updated  (significant other) at bedside.    Discharge instructions/Information to patient and family:   See after visit summary for information provided to patient and family.      Provisions for Follow-Up Care:  See after visit summary for information related to follow-up care and any pertinent home health orders.      Mobility at time of Discharge:   Basic Mobility Inpatient Raw Score: 24  JH-HLM Goal: 8: Walk 250 feet or more  JH-HLM Achieved: 8: Walk 250 feet ot more  HLM Goal achieved. Continue to encourage appropriate mobility.     Disposition:   Home    Planned Readmission: No    Discharge Medications:  See after visit summary for reconciled discharge medications provided to patient and/or family.      **Please Note: This note may have been constructed using a voice recognition system**

## 2024-09-16 LAB — ACTIN IGG SERPL-ACNC: 2 UNITS (ref 0–19)

## 2024-09-16 NOTE — UTILIZATION REVIEW
NOTIFICATION OF INPATIENT ADMISSION   AUTHORIZATION REQUEST   SERVICING FACILITY:   [place] [address]  Tax ID: [id number]  NPI: [id number] ATTENDING PROVIDER:  Attending Name and NPI#: Milo Li Do [[id number]]  Address: [address] [place] [address]  Phone: [phone]     PORTAL NOT LOADING DX CODE   ADMISSION INFORMATION:  Place of Service: Inpatient [facility]  Place of Service Code: 21  Inpatient Admission Date/Time: 9/12/24 11:01 PM  Discharge Date/Time: 9/14/2024  3:48 PM  Admitting Diagnosis Code/Description:  Pancreatitis [K85.90]     UTILIZATION REVIEW CONTACT:  Cristy Cherry Utilization   Network Utilization Review Department  Phone: [phone]  Fax [phone]  Email: [email or web address]  Contact for approvals/pending authorizations, clinical reviews, and discharge.     PHYSICIAN ADVISORY SERVICES:  Medical Necessity Denial & Wesz-ql-Fpsq Review  Phone: [phone]  Fax: [phone]  Email: [email or web address]     DISCHARGE SUPPORT TEAM:  For Patients Discharge Needs & Updates  Phone: [phone] opt. 2 Fax: [phone]  Email: [email or web address]

## 2024-09-16 NOTE — UTILIZATION REVIEW
NOTIFICATION OF ADMISSION DISCHARGE   This is a Notification of Discharge from [facility]. Please be advised that this patient has been discharge from our facility. Below you will find the admission and discharge date and time including the patient’s disposition.   UTILIZATION REVIEW CONTACT:  Melony Finney  Utilization   Network Utilization Review Department  Phone: [phone] x carefully listen to the prompts. All voicemails are confidential.  Email: [email or web address]     ADMISSION INFORMATION  PRESENTATION DATE: 9/12/2024 11:01 PM  OBERVATION ADMISSION DATE: N/A  INPATIENT ADMISSION DATE: 9/12/24 11:01 PM   DISCHARGE DATE: 9/14/2024  3:48 PM   DISPOSITION:Home/Self Care    Network Utilization Review Department  ATTENTION: Please call with any questions or concerns to [phone] and carefully listen to the prompts so that you are directed to the right person. All voicemails are confidential.   For Discharge needs, contact Care Management DC Support Team at [phone] opt. 2  Send all requests for admission clinical reviews, approved or denied determinations and any other requests to dedicated fax number below belonging to the campus where the patient is receiving treatment. List of dedicated fax numbers for the Facilities:  FACILITY NAME UR FAX NUMBER   ADMISSION DENIALS (Administrative/Medical Necessity) [phone]   DISCHARGE SUPPORT TEAM ([facility]) [phone]   PARENT CHILD HEALTH (Maternity/NICU/Pediatrics) [phone]   [facility] [phone]   [facility] [phone]   [facility] [phone]   General acute hospital [phone]   [facility] [phone]   [facility] [phone]   [facility] [phone]   [facility] [phone]    [facility] [phone]   [facility] [phone]   [facility] [phone]   [facility] [phone]

## 2024-09-20 ENCOUNTER — APPOINTMENT (OUTPATIENT)
Dept: LAB | Facility: HOSPITAL | Age: 29
End: 2024-09-20
Payer: MEDICARE

## 2024-09-20 DIAGNOSIS — R74.01 TRANSAMINITIS: ICD-10-CM

## 2024-09-20 LAB
ALBUMIN SERPL BCG-MCNC: 4.8 G/DL (ref 3.5–5)
ALP SERPL-CCNC: 135 U/L (ref 34–104)
ALT SERPL W P-5'-P-CCNC: 91 U/L (ref 7–52)
ANION GAP SERPL CALCULATED.3IONS-SCNC: 6 MMOL/L (ref 4–13)
AST SERPL W P-5'-P-CCNC: 18 U/L (ref 13–39)
BILIRUB SERPL-MCNC: 0.69 MG/DL (ref 0.2–1)
BUN SERPL-MCNC: 10 MG/DL (ref 5–25)
CALCIUM SERPL-MCNC: 10 MG/DL (ref 8.4–10.2)
CHLORIDE SERPL-SCNC: 104 MMOL/L (ref 96–108)
CO2 SERPL-SCNC: 27 MMOL/L (ref 21–32)
CREAT SERPL-MCNC: 0.51 MG/DL (ref 0.6–1.3)
GFR SERPL CREATININE-BSD FRML MDRD: 130 ML/MIN/1.73SQ M
GLUCOSE SERPL-MCNC: 95 MG/DL (ref 65–140)
POTASSIUM SERPL-SCNC: 3.6 MMOL/L (ref 3.5–5.3)
PROT SERPL-MCNC: 7.7 G/DL (ref 6.4–8.4)
SODIUM SERPL-SCNC: 137 MMOL/L (ref 135–147)

## 2024-09-20 PROCEDURE — 36415 COLL VENOUS BLD VENIPUNCTURE: CPT

## 2024-09-20 PROCEDURE — 80053 COMPREHEN METABOLIC PANEL: CPT

## 2024-12-03 ENCOUNTER — OFFICE VISIT (OUTPATIENT)
Dept: GASTROENTEROLOGY | Facility: MEDICAL CENTER | Age: 29
End: 2024-12-03
Payer: MEDICARE

## 2024-12-03 VITALS
WEIGHT: 184.8 LBS | HEART RATE: 85 BPM | TEMPERATURE: 98.2 F | DIASTOLIC BLOOD PRESSURE: 76 MMHG | SYSTOLIC BLOOD PRESSURE: 112 MMHG | BODY MASS INDEX: 30.75 KG/M2

## 2024-12-03 DIAGNOSIS — R10.12 LUQ PAIN: Primary | ICD-10-CM

## 2024-12-03 DIAGNOSIS — Z87.19 HISTORY OF PANCREATITIS: ICD-10-CM

## 2024-12-03 DIAGNOSIS — R79.89 ELEVATED LFTS: ICD-10-CM

## 2024-12-03 PROCEDURE — 99214 OFFICE O/P EST MOD 30 MIN: CPT | Performed by: INTERNAL MEDICINE

## 2024-12-03 RX ORDER — PANTOPRAZOLE SODIUM 40 MG/1
40 TABLET, DELAYED RELEASE ORAL DAILY
Qty: 30 TABLET | Refills: 1 | Status: SHIPPED | OUTPATIENT
Start: 2024-12-03 | End: 2025-02-01

## 2024-12-03 NOTE — PROGRESS NOTES
"Name: Gi Monroe      : 1995      MRN: [id number]  Encounter Provider: Matt Avendaño MD  Encounter Date: 12/3/2024   Encounter department: [facility]  :  Assessment & Plan  LUQ pain  I have personally reviewed the pertinent lab values, radiology images, imaging reports.    Chronic LUQ pain which was present prior to CCY and continued to have after CCY. Will rule out H. Pylori. Patient does not have classic symptoms of GERD but given intermittent LUQ pain and \"bad smell\" in her mouth, trial of PPI for 8 weeks after submitting H. Pylori stool Ag. If the pain persists, consider EGD for luminal evaluation.  Orders:    Hepatic function panel; Future    H. pylori antigen, stool; Future    pantoprazole (PROTONIX) 40 mg tablet; Take 1 tablet (40 mg total) by mouth daily    History of pancreatitis  No excessive EtOH use. TG 75 in 2024. Acute pancreatitis in 2024 most likely in the setting of retained stone vs microlithiasis. Currently without pain. If severe pain similar to her recent hospitalization occurs, seek immediate medical attention.   Orders:    Hepatic function panel; Future    Elevated LFTs  HAV IgM NR  HBsAg NR  HBcAb IgM NR  HCV Ab NR  ASMA negative  AST/ALT 18/91, , Tbili 0.69 on 2024 which was downtrending. This was in the setting of acute pancreatitis most likely 2/2 retained stone or microlithiasis. Will repeat LFT to ensure it normalized. If not, will obtain further blood work such as iron panel, HBcAb IgG and JOSEPHINE, ALKM, AMA etc.  Orders:    Hepatic function panel; Future    RTC in 6 months or sooner    History of Present Illness     HPI  Gi Monroe is a 29 y.o. female PMH chronic cholecystitis s/p CCY (2024), appendicitis on CT (2024) but appeared healthy at the time of CCY so not removed, acute pancreatitis (2024) who presents hx of abdominal pain.   History obtained from: patient    Last seen by GI inpatient on 2024 " for acute pancreatitis which was thought ot be 2/2 retained stone vs microlithiasis. LFTs downtrended and symptoms improved    Reports epigastric pain about 3 weeks ago. 10 out of 10 in severity. Sharp pain. Lasted for a couple of hours. Tried ibuprofen with some relief.  No fevers or chills. No nausea or vomiting.  No longer having upper abdominal pain.     Chronic LUQ pain for the past several months - which is different pain from above. Feels like she has a bump there. This started prior to CCY.    Also reports bad smell coming up to her mouth. Dentist said that this is not 2/2 her teeth. No heartburn/regurgitation.     Never EGD/colonoscopy  Fhx: MGM passed away from liver cancer (drank a lot of ETOH), no other cancer  Shx: occasional ETOH, no cig/drugs    Review of Systems  Past Medical History   Past Medical History:   Diagnosis Date    No known health problems      Past Surgical History:   Procedure Laterality Date    [place] LAPAROSCOPY SURG CHOLECYSTECTOMY N/A 8/21/2024    Procedure: CHOLECYSTECTOMY LAPAROSCOPIC  ROBOTIC ASSISTED;  Surgeon: Jia Ge MD;  Location: [facility] OR;  Service: General    SOFT TISSUE CYST EXCISION      head-2023     Family History   Problem Relation Age of Onset    No Known Problems Mother     No Known Problems Father       reports that she has never smoked. She has never been exposed to tobacco smoke. She has never used smokeless tobacco. She reports that she does not drink alcohol and does not use drugs.  Current Outpatient Medications on File Prior to Visit   Medication Sig Dispense Refill    etonogestrel (Nexplanon) subdermal implant Inject 1 each under the skin      [DISCONTINUED] naproxen (Naprosyn) 500 mg tablet Take 1 tablet (500 mg total) by mouth 2 (two) times a day as needed for mild pain (Patient not taking: Reported on 12/3/2024) 30 tablet 0     No current facility-administered medications on file prior to visit.   No Known Allergies   Current Outpatient Medications  on File Prior to Visit   Medication Sig Dispense Refill    etonogestrel (Nexplanon) subdermal implant Inject 1 each under the skin      [DISCONTINUED] naproxen (Naprosyn) 500 mg tablet Take 1 tablet (500 mg total) by mouth 2 (two) times a day as needed for mild pain (Patient not taking: Reported on 12/3/2024) 30 tablet 0     No current facility-administered medications on file prior to visit.         Objective   /76   Pulse 85   Temp 98.2 °F (36.8 °C)   Wt 83.8 kg (184 lb 12.8 oz)   BMI 30.75 kg/m²      Physical Exam  Vitals reviewed.   Constitutional:       Appearance: Normal appearance.   HENT:      Head: Normocephalic and atraumatic.   Cardiovascular:      Rate and Rhythm: Normal rate.   Pulmonary:      Effort: Pulmonary effort is normal. No respiratory distress.   Abdominal:      General: There is no distension.      Palpations: Abdomen is soft.      Tenderness: There is no abdominal tenderness.      Comments: No mass felt in LUQ   Musculoskeletal:         General: No swelling.   Skin:     General: Skin is warm and dry.   Neurological:      General: No focal deficit present.      Mental Status: She is alert.   Psychiatric:      Comments: Flat affect

## 2024-12-06 ENCOUNTER — HOSPITAL ENCOUNTER (EMERGENCY)
Facility: HOSPITAL | Age: 29
Discharge: HOME | End: 2024-12-06
Attending: EMERGENCY MEDICINE
Payer: MEDICARE

## 2024-12-06 ENCOUNTER — RESULTS FOLLOW-UP (OUTPATIENT)
Dept: EMERGENCY DEPT | Facility: HOSPITAL | Age: 29
End: 2024-12-06

## 2024-12-06 ENCOUNTER — RESULTS FOLLOW-UP (OUTPATIENT)
Dept: GASTROENTEROLOGY | Facility: MEDICAL CENTER | Age: 29
End: 2024-12-06

## 2024-12-06 ENCOUNTER — APPOINTMENT (OUTPATIENT)
Dept: LAB | Facility: HOSPITAL | Age: 29
End: 2024-12-06
Payer: MEDICARE

## 2024-12-06 VITALS
HEIGHT: 65 IN | TEMPERATURE: 98.9 F | RESPIRATION RATE: 15 BRPM | OXYGEN SATURATION: 99 % | SYSTOLIC BLOOD PRESSURE: 133 MMHG | HEART RATE: 90 BPM | DIASTOLIC BLOOD PRESSURE: 79 MMHG | BODY MASS INDEX: 30.49 KG/M2 | WEIGHT: 182.98 LBS

## 2024-12-06 DIAGNOSIS — R79.89 ELEVATED LFTS: ICD-10-CM

## 2024-12-06 DIAGNOSIS — Z87.19 HISTORY OF PANCREATITIS: ICD-10-CM

## 2024-12-06 DIAGNOSIS — N39.0 UTI (URINARY TRACT INFECTION): Primary | ICD-10-CM

## 2024-12-06 DIAGNOSIS — R10.12 LUQ PAIN: ICD-10-CM

## 2024-12-06 LAB
ALBUMIN SERPL BCG-MCNC: 4.3 G/DL (ref 3.5–5)
ALP SERPL-CCNC: 69 U/L (ref 34–104)
ALT SERPL W P-5'-P-CCNC: 16 U/L (ref 7–52)
AST SERPL W P-5'-P-CCNC: 13 U/L (ref 13–39)
BACTERIA UR QL AUTO: ABNORMAL /HPF
BILIRUB DIRECT SERPL-MCNC: 0.05 MG/DL (ref 0–0.2)
BILIRUB SERPL-MCNC: 0.5 MG/DL (ref 0.2–1)
BILIRUB UR QL STRIP: NEGATIVE
BUDDING YEAST: PRESENT
CLARITY UR: ABNORMAL
COLOR UR: YELLOW
EXT PREGNANCY TEST URINE: NEGATIVE
EXT. CONTROL: NORMAL
GLUCOSE UR STRIP-MCNC: NEGATIVE MG/DL
HGB UR QL STRIP.AUTO: ABNORMAL
KETONES UR STRIP-MCNC: NEGATIVE MG/DL
LEUKOCYTE ESTERASE UR QL STRIP: ABNORMAL
NITRITE UR QL STRIP: POSITIVE
NON-SQ EPI CELLS URNS QL MICRO: ABNORMAL /HPF
PH UR STRIP.AUTO: 5.5 [PH] (ref 4.5–8)
PROT SERPL-MCNC: 7.5 G/DL (ref 6.4–8.4)
PROT UR STRIP-MCNC: ABNORMAL MG/DL
RBC #/AREA URNS AUTO: ABNORMAL /HPF
SP GR UR STRIP.AUTO: >=1.03 (ref 1–1.03)
UROBILINOGEN UR QL STRIP.AUTO: 0.2 E.U./DL
WBC #/AREA URNS AUTO: ABNORMAL /HPF
WBC CLUMPS # UR AUTO: PRESENT /UL

## 2024-12-06 PROCEDURE — 87086 URINE CULTURE/COLONY COUNT: CPT

## 2024-12-06 PROCEDURE — 99284 EMERGENCY DEPT VISIT MOD MDM: CPT

## 2024-12-06 PROCEDURE — 81025 URINE PREGNANCY TEST: CPT

## 2024-12-06 PROCEDURE — 87186 SC STD MICRODIL/AGAR DIL: CPT

## 2024-12-06 PROCEDURE — 87077 CULTURE AEROBIC IDENTIFY: CPT

## 2024-12-06 PROCEDURE — 36415 COLL VENOUS BLD VENIPUNCTURE: CPT

## 2024-12-06 PROCEDURE — 99283 EMERGENCY DEPT VISIT LOW MDM: CPT

## 2024-12-06 PROCEDURE — 81001 URINALYSIS AUTO W/SCOPE: CPT

## 2024-12-06 PROCEDURE — 80076 HEPATIC FUNCTION PANEL: CPT

## 2024-12-06 RX ORDER — NITROFURANTOIN 25; 75 MG/1; MG/1
100 CAPSULE ORAL 2 TIMES DAILY
Qty: 10 CAPSULE | Refills: 0 | Status: SHIPPED | OUTPATIENT
Start: 2024-12-06

## 2024-12-06 RX ORDER — PHENAZOPYRIDINE HYDROCHLORIDE 200 MG/1
200 TABLET, FILM COATED ORAL 3 TIMES DAILY
Qty: 6 TABLET | Refills: 0 | Status: SHIPPED | OUTPATIENT
Start: 2024-12-06

## 2024-12-06 NOTE — ED PROVIDER NOTES
Time reflects when diagnosis was documented in both MDM as applicable and the Disposition within this note       Time User Action Codes Description Comment    12/6/2024 10:45 AM Venus Cisneros [N39.0] UTI (urinary tract infection)           ED Disposition       ED Disposition   Discharge    Condition   Stable    Date/Time   Fri Dec 6, 2024 10:45 AM    Comment   Gi Monroe discharge to home/self care.                   Assessment & Plan       Medical Decision Making  Patient is a 29-year-old female presenting with urinary symptoms for 1 day.  Vitals within normal limits on arrival and she is in no acute distress.  No abdominal or CVA tenderness.  UA with positive leukocytes, nitrites, blood consistent with UTI.  Will treat with Macrobid and symptomatically with Pyridium.    I have discussed findings and plan for discharge with the patient/caregiver. Follow up with the appropriate providers including primary care physician was discussed. Return precautions discussed with patient/caregiver as outlined in AVS. Patient/caregiver verbally expressed understanding. Patient stable at time of discharge and ambulated out of the emergency department.     Amount and/or Complexity of Data Reviewed  Labs: ordered.    Risk  Prescription drug management.             Medications - No data to display    ED Risk Strat Scores                                               History of Present Illness       Chief Complaint   Patient presents with    Possible UTI     Urinary burning and frequency began today       Past Medical History:   Diagnosis Date    No known health problems       Past Surgical History:   Procedure Laterality Date    [place] LAPAROSCOPY SURG CHOLECYSTECTOMY N/A 8/21/2024    Procedure: CHOLECYSTECTOMY LAPAROSCOPIC  ROBOTIC ASSISTED;  Surgeon: Jia Ge MD;  Location: [facility];  Service: General    SOFT TISSUE CYST EXCISION      head-2023      Family History   Problem Relation Age of Onset    No Known  Problems Mother     No Known Problems Father       Social History     Tobacco Use    Smoking status: Never     Passive exposure: Never    Smokeless tobacco: Never   Vaping Use    Vaping status: Never Used   Substance Use Topics    Alcohol use: No    Drug use: No      E-Cigarette/Vaping    E-Cigarette Use Never User       E-Cigarette/Vaping Substances    Nicotine No     THC No     CBD No     Flavoring No     Other No     Unknown No       I have reviewed and agree with the history as documented.     Patient is a 29-year female presenting for evaluation of urinary symptoms since this morning.  Reports frequency, dysuria, hematuria.  No abdominal or flank pain, fevers, chills, nausea, vomiting, diarrhea, constipation.  No abnormal vaginal discharge or bleeding.  Denies concern for STIs.  No medications prior to arrival.          Review of Systems   Constitutional:  Negative for chills and fever.   HENT:  Negative for ear pain and sore throat.    Eyes:  Negative for pain and visual disturbance.   Respiratory:  Negative for cough and shortness of breath.    Cardiovascular:  Negative for chest pain and palpitations.   Gastrointestinal:  Negative for abdominal pain, diarrhea, nausea and vomiting.   Genitourinary:  Positive for dysuria, frequency and hematuria. Negative for vaginal bleeding and vaginal discharge.   Musculoskeletal:  Negative for arthralgias and back pain.   Skin:  Negative for color change and rash.   Neurological:  Negative for seizures and syncope.   All other systems reviewed and are negative.          Objective       ED Triage Vitals [12/06/24 1024]   Temperature Pulse Blood Pressure Respirations SpO2 Patient Position - Orthostatic VS   98.9 °F (37.2 °C) 90 133/79 15 99 % --      Temp Source Heart Rate Source BP Location FiO2 (%) Pain Score    Oral -- -- -- 3      Vitals      Date and Time Temp Pulse SpO2 Resp BP Pain Score FACES Pain Rating User   12/06/24 1024 98.9 °F (37.2 °C) 90 99 % 15 133/79 3 --  EP            Physical Exam  Vitals and nursing note reviewed.   Constitutional:       General: She is not in acute distress.     Appearance: Normal appearance. She is not toxic-appearing.   HENT:      Head: Normocephalic and atraumatic.      Right Ear: External ear normal.      Left Ear: External ear normal.      Nose: Nose normal.      Mouth/Throat:      Mouth: Mucous membranes are moist.   Eyes:      General: No scleral icterus.        Right eye: No discharge.         Left eye: No discharge.      Extraocular Movements: Extraocular movements intact.      Conjunctiva/sclera: Conjunctivae normal.   Cardiovascular:      Rate and Rhythm: Normal rate and regular rhythm.      Pulses: Normal pulses.      Heart sounds: Normal heart sounds.   Pulmonary:      Effort: Pulmonary effort is normal. No respiratory distress.      Breath sounds: Normal breath sounds.   Abdominal:      Palpations: Abdomen is soft.      Tenderness: There is no abdominal tenderness. There is no right CVA tenderness or left CVA tenderness.   Musculoskeletal:         General: No tenderness, deformity or signs of injury.      Cervical back: Normal range of motion and neck supple.   Skin:     General: Skin is dry.      Coloration: Skin is not jaundiced.      Findings: No erythema or rash.   Neurological:      General: No focal deficit present.      Mental Status: She is alert and oriented to person, place, and time. Mental status is at baseline.      Motor: No weakness.      Gait: Gait normal.   Psychiatric:         Mood and Affect: Mood normal.         Behavior: Behavior normal.         Thought Content: Thought content normal.         Results Reviewed       Procedure Component Value Units Date/Time    Urine Microscopic [[id number]]  (Abnormal) Collected: 12/06/24 1030    Lab Status: Final result Specimen: Urine, Other Updated: 12/06/24 1109     RBC, UA Innumerable /hpf      WBC, UA Innumerable /hpf      Epithelial Cells Occasional /hpf      Bacteria, UA  Occasional /hpf      WBC Clumps Present     Budding Yeast Present    Urine culture [[id number]] Collected: 12/06/24 1030    Lab Status: In process Specimen: Urine, Other Updated: 12/06/24 1108    POCT pregnancy, urine [[id number]]  (Normal) Collected: 12/06/24 1033    Lab Status: Final result Updated: 12/06/24 1033     EXT Preg Test, Ur Negative     Control Valid    Urine Macroscopic, POC [[id number]]  (Abnormal) Collected: 12/06/24 1030    Lab Status: Final result Specimen: Urine Updated: 12/06/24 1032     Color, UA Yellow     Clarity, UA Slightly Cloudy     pH, UA 5.5     Leukocytes, UA Small     Nitrite, UA Positive     Protein,  (2+) mg/dl      Glucose, UA Negative mg/dl      Ketones, UA Negative mg/dl      Urobilinogen, UA 0.2 E.U./dl      Bilirubin, UA Negative     Occult Blood, UA Large     Specific Gravity, UA >=1.030    Narrative:      CLINITEK RESULT            No orders to display       Procedures    ED Medication and Procedure Management   Prior to Admission Medications   Prescriptions Last Dose Informant Patient Reported? Taking?   etonogestrel (Nexplanon) subdermal implant  Self Yes No   Sig: Inject 1 each under the skin   pantoprazole (PROTONIX) 40 mg tablet   No No   Sig: Take 1 tablet (40 mg total) by mouth daily      Facility-Administered Medications: None     Discharge Medication List as of 12/6/2024 10:46 AM        START taking these medications    Details   nitrofurantoin (MACROBID) 100 mg capsule Take 1 capsule (100 mg total) by mouth 2 (two) times a day, Starting Fri 12/6/2024, Normal      phenazopyridine (PYRIDIUM) 200 mg tablet Take 1 tablet (200 mg total) by mouth 3 (three) times a day, Starting Fri 12/6/2024, Normal           CONTINUE these medications which have NOT CHANGED    Details   etonogestrel (Nexplanon) subdermal implant Inject 1 each under the skin, Historical Med      pantoprazole (PROTONIX) 40 mg tablet Take 1 tablet (40 mg total) by mouth daily, Starting Tue 12/3/2024,  Until Sat 2/1/2025, Normal           No discharge procedures on file.  ED SEPSIS DOCUMENTATION   Time reflects when diagnosis was documented in both MDM as applicable and the Disposition within this note       Time User Action Codes Description Comment    12/6/2024 10:45 AM Venus Cisneros Add [N39.0] UTI (urinary tract infection)                  Venus Cisneros PA-C  12/06/24 1211

## 2024-12-06 NOTE — DISCHARGE INSTRUCTIONS
Take antibiotics as prescribed. Use pyridium as needed for symptoms. Follow up with primary care doctor if symptoms persist.

## 2024-12-08 LAB — BACTERIA UR CULT: ABNORMAL

## 2024-12-10 ENCOUNTER — APPOINTMENT (OUTPATIENT)
Dept: LAB | Facility: HOSPITAL | Age: 29
End: 2024-12-10
Payer: MEDICARE

## 2024-12-10 DIAGNOSIS — R10.12 LUQ PAIN: ICD-10-CM

## 2024-12-10 PROCEDURE — 87338 HPYLORI STOOL AG IA: CPT

## 2024-12-11 LAB — H PYLORI AG STL QL IA: NEGATIVE

## 2025-01-23 NOTE — ASSESSMENT & PLAN NOTE
Patient with history of recent laparoscopic cholecystectomy by Dr. Ge 8/21 presents with worsening epigastric pain, exacerbated approximately 1 hour after eating without relief  CT abdomen with interstitial edematous pancreatitis, lipase 10,027  Also with transaminitis and hyperbilirubinemia which are now downtrending  Seen and evaluated by GI and surgery.  Suspected due to passing stone  Patient has been given IVF and pain medications with improvement  Patient is not a drinker.  Triglycerides normal  Advanced diet slowly and tolerated low-fat diet. Clear for discharge home  Discussed with her the need for repeat blood work in 1 week with her PCP  Encouraged her to continue low-fat diet for the next week  Educated her to return to the ER if she has a following bowel limited to worsening abdominal pain, fevers, difficulty tolerating oral intake, vomiting  Can use OTC NSAIDs with food and water. Defer tylenol for time being due to transaminitis   [Routine Follow-Up] : routine follow-up visit for [Brain Tumor] : brain tumor [Other: ___] : [unfilled] [Other: _____] : [unfilled]

## 2025-03-19 ENCOUNTER — HOSPITAL ENCOUNTER (EMERGENCY)
Facility: HOSPITAL | Age: 30
Discharge: HOME | End: 2025-03-19
Payer: MEDICARE

## 2025-03-19 ENCOUNTER — APPOINTMENT (EMERGENCY)
Dept: RADIOLOGY | Facility: HOSPITAL | Age: 30
End: 2025-03-19
Payer: MEDICARE

## 2025-03-19 VITALS
TEMPERATURE: 98.4 F | OXYGEN SATURATION: 97 % | SYSTOLIC BLOOD PRESSURE: 132 MMHG | HEART RATE: 95 BPM | WEIGHT: 181.22 LBS | DIASTOLIC BLOOD PRESSURE: 64 MMHG | BODY MASS INDEX: 30.16 KG/M2 | RESPIRATION RATE: 18 BRPM

## 2025-03-19 DIAGNOSIS — M25.561 ACUTE BILATERAL KNEE PAIN: Primary | ICD-10-CM

## 2025-03-19 DIAGNOSIS — M25.562 ACUTE BILATERAL KNEE PAIN: Primary | ICD-10-CM

## 2025-03-19 PROCEDURE — 73564 X-RAY EXAM KNEE 4 OR MORE: CPT

## 2025-03-19 PROCEDURE — 99283 EMERGENCY DEPT VISIT LOW MDM: CPT

## 2025-03-19 PROCEDURE — 96372 THER/PROPH/DIAG INJ SC/IM: CPT

## 2025-03-19 PROCEDURE — 99284 EMERGENCY DEPT VISIT MOD MDM: CPT

## 2025-03-19 RX ORDER — LIDOCAINE 50 MG/G
1 PATCH TOPICAL DAILY
Qty: 7 PATCH | Refills: 0 | Status: SHIPPED | OUTPATIENT
Start: 2025-03-19

## 2025-03-19 RX ORDER — NAPROXEN 500 MG/1
500 TABLET ORAL 2 TIMES DAILY WITH MEALS
Qty: 14 TABLET | Refills: 0 | Status: SHIPPED | OUTPATIENT
Start: 2025-03-19 | End: 2025-03-26

## 2025-03-19 RX ORDER — LIDOCAINE 50 MG/G
2 PATCH TOPICAL ONCE
Status: DISCONTINUED | OUTPATIENT
Start: 2025-03-19 | End: 2025-03-20 | Stop reason: HOSPADM

## 2025-03-19 RX ORDER — KETOROLAC TROMETHAMINE 30 MG/ML
15 INJECTION, SOLUTION INTRAMUSCULAR; INTRAVENOUS ONCE
Status: COMPLETED | OUTPATIENT
Start: 2025-03-19 | End: 2025-03-19

## 2025-03-19 RX ORDER — ACETAMINOPHEN 325 MG/1
975 TABLET ORAL ONCE
Status: COMPLETED | OUTPATIENT
Start: 2025-03-19 | End: 2025-03-19

## 2025-03-19 RX ADMIN — LIDOCAINE 2 PATCH: 700 PATCH TOPICAL at 21:24

## 2025-03-19 RX ADMIN — KETOROLAC TROMETHAMINE 15 MG: 30 INJECTION, SOLUTION INTRAMUSCULAR; INTRAVENOUS at 21:24

## 2025-03-19 RX ADMIN — ACETAMINOPHEN 975 MG: 325 TABLET, FILM COATED ORAL at 21:24

## 2025-03-19 NOTE — Clinical Note
Gi Monroe was seen and treated in our emergency department on 3/19/2025.                Diagnosis:     Gi  may return to work on return date.    She may return on this date: 03/20/2025    Please limit Gi Monroe to light/limited work due to her knee injuries.     If you have any questions or concerns, please don't hesitate to call.      Andrew Pearce, DO    ______________________________           _______________          _______________  Hospital Representative                              Date                                Time

## 2025-03-20 NOTE — ED PROVIDER NOTES
Time reflects when diagnosis was documented in both MDM as applicable and the Disposition within this note       Time User Action Codes Description Comment    3/19/2025  9:12 PM Andrew Pearce Add [M25.561,  M25.562] Acute bilateral knee pain           ED Disposition       ED Disposition   Discharge    Condition   Stable    Date/Time   Wed Mar 19, 2025  9:11 PM    Comment   Gi Monroe discharge to home/self care.                   Assessment & Plan       Medical Decision Making  Patient with history as below presented with bilateral knee pain. History obtained from patient.    After initial evaluation differential diagnosis includes: Ligamentous injury, overuse injury, arthritis, fracture, gout.     Plan: X-ray bilateral knee, Toradol, Tylenol    ED summary: Independently reviewed imaging without acute osseous abnormality. Patient was treated with below with improvement in symptoms. Reassessed the patient and they continue to be well appearing. Presentation most consistent with nonemergent knee pain, likely overuse injury from recent trip to the gym for the first time in a while. Stable for outpatient management.    Disposition: Discharged with instructions to obtain outpatient follow up of patient's symptoms and findings, with strict return precautions if patient develops new or worsening symptoms. Patient understands this plan and is agreeable. All questions answered. Patient discharged home with return precautions.    Amount and/or Complexity of Data Reviewed  Radiology: ordered and independent interpretation performed.    Risk  OTC drugs.  Prescription drug management.        ED Course as of 03/20/25 0501   Wed Mar 19, 2025   2155 No acute osseous abnormality on x-ray imaging of bilateral knees which has yet to crossover in PACS system.       Medications   ketorolac (TORADOL) injection 15 mg (15 mg Intramuscular Given 3/19/25 2124)   acetaminophen (TYLENOL) tablet 975 mg (975 mg Oral Given 3/19/25 2124)        ED Risk Strat Scores                            SBIRT 22yo+      Flowsheet Row Most Recent Value   Initial Alcohol Screen: US AUDIT-C     1. How often do you have a drink containing alcohol? 0 Filed at: 03/19/2025 2221   2. How many drinks containing alcohol do you have on a typical day you are drinking?  0 Filed at: 03/19/2025 2221   3a. Male UNDER 65: How often do you have five or more drinks on one occasion? 0 Filed at: 03/19/2025 2221   3b. FEMALE Any Age, or MALE 65+: How often do you have 4 or more drinks on one occassion? 0 Filed at: 03/19/2025 2221   Audit-C Score 0 Filed at: 03/19/2025 2221   ALISIA: How many times in the past year have you...    Used an illegal drug or used a prescription medication for non-medical reasons? Never Filed at: 03/19/2025 2221                            History of Present Illness       Chief Complaint   Patient presents with    Knee Pain     B/L knee pain, started last Friday after going to the gym on Thursday, pain is increasing, having difficulty walking       Past Medical History:   Diagnosis Date    No known health problems       Past Surgical History:   Procedure Laterality Date    [place] LAPAROSCOPY SURG CHOLECYSTECTOMY N/A 8/21/2024    Procedure: CHOLECYSTECTOMY LAPAROSCOPIC  ROBOTIC ASSISTED;  Surgeon: Jia Ge MD;  [facility];  Service: General    SOFT TISSUE CYST EXCISION      head-2023      Family History   Problem Relation Age of Onset    No Known Problems Mother     No Known Problems Father       Social History     Tobacco Use    Smoking status: Never     Passive exposure: Never    Smokeless tobacco: Never   Vaping Use    Vaping status: Never Used   Substance Use Topics    Alcohol use: No    Drug use: No      E-Cigarette/Vaping    E-Cigarette Use Never User       E-Cigarette/Vaping Substances    Nicotine No     THC No     CBD No     Flavoring No     Other No     Unknown No       I have reviewed and agree with the history as documented.     Patient  is a 29-year-old female with no significant past medical history, presenting for evaluation of bilateral knee pain.  Patient reports that approximately 5 days ago she went to the gym for the first time in a while.  She says that she was doing a lot of walking on the treadmill and the next day she realized that she was having some pain in her bilateral knees.  She describes this as a sharp pain that is predominantly on the inner portion of her bilateral knees.  It is worse with motion and relieved by rest.  She denies any specific injury to the area.  She has remained ambulatory.  She has not had any swelling or warmth of the area.  She has no history of issues with her knees.  She is otherwise without acute complaint.        Review of Systems   Constitutional:  Negative for fever.   Musculoskeletal:  Positive for arthralgias.   Neurological:  Negative for weakness and numbness.           Objective       ED Triage Vitals [03/19/25 2032]   Temperature Pulse Blood Pressure Respirations SpO2 Patient Position - Orthostatic VS   98.4 °F (36.9 °C) 95 132/64 18 97 % Sitting      Temp Source Heart Rate Source BP Location FiO2 (%) Pain Score    Oral Monitor Right arm -- 8      Vitals      Date and Time Temp Pulse SpO2 Resp BP Pain Score FACES Pain Rating User   03/19/25 2032 98.4 °F (36.9 °C) 95 97 % 18 132/64 8 -- EC            Physical Exam  Vitals and nursing note reviewed.   Constitutional:       General: She is not in acute distress.     Appearance: Normal appearance. She is not ill-appearing or toxic-appearing.   HENT:      Head: Normocephalic and atraumatic.      Right Ear: External ear normal.      Left Ear: External ear normal.      Nose: Nose normal.   Eyes:      General: No scleral icterus.        Right eye: No discharge.         Left eye: No discharge.      Extraocular Movements: Extraocular movements intact.      Conjunctiva/sclera: Conjunctivae normal.   Cardiovascular:      Rate and Rhythm: Normal rate.       Heart sounds: Normal heart sounds. No murmur heard.     No friction rub. No gallop.   Pulmonary:      Effort: Pulmonary effort is normal. No respiratory distress.      Breath sounds: Normal breath sounds.   Abdominal:      General: Abdomen is flat. There is no distension.      Palpations: Abdomen is soft. There is no mass.      Tenderness: There is no abdominal tenderness.   Genitourinary:     Comments: Deferred  Musculoskeletal:      Comments: Bilateral knees with minimal diffuse tenderness, predominantly over the medial joint lines.  Posterior drawer and lachman's exam without significant laxity, varus and valgus stress without significant laxity, full range of motion with full strength, no effusion, neurovascular exam distally intact per routine, DP pulses 2+/4, compartments surrounding are soft, hip/groin is nontender   Skin:     General: Skin is warm and dry.   Neurological:      General: No focal deficit present.      Mental Status: She is alert.         Results Reviewed       None            XR knee 4+ views Right injury   ED Interpretation by Andrew Pearce DO (03/19 2201)   No acute osseous abnormality      XR knee 4+ views left injury   ED Interpretation by Andrew Pearce DO (03/19 2201)   No acute osseous abnormality          Procedures    ED Medication and Procedure Management   Prior to Admission Medications   Prescriptions Last Dose Informant Patient Reported? Taking?   etonogestrel (Nexplanon) subdermal implant  Self Yes No   Sig: Inject 1 each under the skin   nitrofurantoin (MACROBID) 100 mg capsule   No No   Sig: Take 1 capsule (100 mg total) by mouth 2 (two) times a day   pantoprazole (PROTONIX) 40 mg tablet   No No   Sig: Take 1 tablet (40 mg total) by mouth daily   phenazopyridine (PYRIDIUM) 200 mg tablet   No No   Sig: Take 1 tablet (200 mg total) by mouth 3 (three) times a day      Facility-Administered Medications: None     Discharge Medication List as of 3/19/2025 10:02 PM         START taking these medications    Details   lidocaine (Lidoderm) 5 % Apply 1 patch topically over 12 hours daily Remove & Discard patch within 12 hours or as directed by MD, Starting Wed 3/19/2025, Normal      naproxen (Naprosyn) 500 mg tablet Take 1 tablet (500 mg total) by mouth 2 (two) times a day with meals for 7 days, Starting Wed 3/19/2025, Until Wed 3/26/2025, Normal           CONTINUE these medications which have NOT CHANGED    Details   etonogestrel (Nexplanon) subdermal implant Inject 1 each under the skin, Historical Med      nitrofurantoin (MACROBID) 100 mg capsule Take 1 capsule (100 mg total) by mouth 2 (two) times a day, Starting Fri 12/6/2024, Normal      pantoprazole (PROTONIX) 40 mg tablet Take 1 tablet (40 mg total) by mouth daily, Starting Tue 12/3/2024, Until Sat 2/1/2025, Normal      phenazopyridine (PYRIDIUM) 200 mg tablet Take 1 tablet (200 mg total) by mouth 3 (three) times a day, Starting Fri 12/6/2024, Normal           No discharge procedures on file.  ED SEPSIS DOCUMENTATION   Time reflects when diagnosis was documented in both MDM as applicable and the Disposition within this note       Time User Action Codes Description Comment    3/19/2025  9:12 PM Andrew Pearce Add [M25.561,  M25.562] Acute bilateral knee pain                  Andrew Pearce, DO  03/20/25 5248

## 2025-03-20 NOTE — DISCHARGE INSTRUCTIONS
Your evaluation suggests that your symptoms are due to a non emergent cause.    Please follow up with your primary care physician within two days.    Return to the Emergency Department if you experience worsening or concerning symptoms.    Thank you for choosing us for your care.    Elias evaluación sugiere que vaishali síntomas se deben a diane causa no emergente.    Jacob un seguimiento con elias médico de atención primaria dentro de dos días.    Regrese al Departamento de Emergencias si experimenta síntomas preocupantes o que empeoran.    Mayank por elegirnos para elias atención.

## 2025-03-27 ENCOUNTER — HOSPITAL ENCOUNTER (EMERGENCY)
Facility: HOSPITAL | Age: 30
Discharge: HOME | End: 2025-03-27
Attending: EMERGENCY MEDICINE
Payer: MEDICARE

## 2025-03-27 ENCOUNTER — APPOINTMENT (EMERGENCY)
Dept: CT IMAGING | Facility: HOSPITAL | Age: 30
End: 2025-03-27
Payer: MEDICARE

## 2025-03-27 VITALS
HEART RATE: 75 BPM | RESPIRATION RATE: 18 BRPM | DIASTOLIC BLOOD PRESSURE: 64 MMHG | SYSTOLIC BLOOD PRESSURE: 131 MMHG | OXYGEN SATURATION: 100 % | TEMPERATURE: 98.9 F

## 2025-03-27 DIAGNOSIS — I82.891 CHRONIC THROMBOSIS OF OVARIAN VEIN: ICD-10-CM

## 2025-03-27 DIAGNOSIS — M54.50 RIGHT LOW BACK PAIN: Primary | ICD-10-CM

## 2025-03-27 LAB
ALBUMIN SERPL BCG-MCNC: 4.4 G/DL (ref 3.5–5)
ALP SERPL-CCNC: 71 U/L (ref 34–104)
ALT SERPL W P-5'-P-CCNC: 12 U/L (ref 7–52)
ANION GAP SERPL CALCULATED.3IONS-SCNC: 6 MMOL/L (ref 4–13)
APTT PPP: 30 SECONDS (ref 23–34)
AST SERPL W P-5'-P-CCNC: 13 U/L (ref 13–39)
BACTERIA UR QL AUTO: NORMAL /HPF
BASOPHILS # BLD AUTO: 0.03 THOUSANDS/ÂΜL (ref 0–0.1)
BASOPHILS NFR BLD AUTO: 1 % (ref 0–1)
BILIRUB SERPL-MCNC: 0.31 MG/DL (ref 0.2–1)
BILIRUB UR QL STRIP: NEGATIVE
BUN SERPL-MCNC: 13 MG/DL (ref 5–25)
CALCIUM SERPL-MCNC: 9.2 MG/DL (ref 8.4–10.2)
CHLORIDE SERPL-SCNC: 108 MMOL/L (ref 96–108)
CLARITY UR: CLEAR
CO2 SERPL-SCNC: 24 MMOL/L (ref 21–32)
COLOR UR: YELLOW
CREAT SERPL-MCNC: 0.51 MG/DL (ref 0.6–1.3)
EOSINOPHIL # BLD AUTO: 0.04 THOUSAND/ÂΜL (ref 0–0.61)
EOSINOPHIL NFR BLD AUTO: 1 % (ref 0–6)
ERYTHROCYTE [DISTWIDTH] IN BLOOD BY AUTOMATED COUNT: 12.4 % (ref 11.6–15.1)
EXT PREGNANCY TEST URINE: NEGATIVE
EXT. CONTROL: NORMAL
GFR SERPL CREATININE-BSD FRML MDRD: 130 ML/MIN/1.73SQ M
GLUCOSE SERPL-MCNC: 95 MG/DL (ref 65–140)
GLUCOSE UR STRIP-MCNC: NEGATIVE MG/DL
HCT VFR BLD AUTO: 34.9 % (ref 34.8–46.1)
HGB BLD-MCNC: 12 G/DL (ref 11.5–15.4)
HGB UR QL STRIP.AUTO: ABNORMAL
IMM GRANULOCYTES # BLD AUTO: 0.02 THOUSAND/UL (ref 0–0.2)
IMM GRANULOCYTES NFR BLD AUTO: 0 % (ref 0–2)
INR PPP: 0.99 (ref 0.85–1.19)
KETONES UR STRIP-MCNC: NEGATIVE MG/DL
LEUKOCYTE ESTERASE UR QL STRIP: ABNORMAL
LIPASE SERPL-CCNC: 20 U/L (ref 11–82)
LYMPHOCYTES # BLD AUTO: 2.09 THOUSANDS/ÂΜL (ref 0.6–4.47)
LYMPHOCYTES NFR BLD AUTO: 33 % (ref 14–44)
MCH RBC QN AUTO: 31.4 PG (ref 26.8–34.3)
MCHC RBC AUTO-ENTMCNC: 34.4 G/DL (ref 31.4–37.4)
MCV RBC AUTO: 91 FL (ref 82–98)
MONOCYTES # BLD AUTO: 0.35 THOUSAND/ÂΜL (ref 0.17–1.22)
MONOCYTES NFR BLD AUTO: 6 % (ref 4–12)
NEUTROPHILS # BLD AUTO: 3.88 THOUSANDS/ÂΜL (ref 1.85–7.62)
NEUTS SEG NFR BLD AUTO: 59 % (ref 43–75)
NITRITE UR QL STRIP: NEGATIVE
NON-SQ EPI CELLS URNS QL MICRO: NORMAL /HPF
NRBC BLD AUTO-RTO: 0 /100 WBCS
PH UR STRIP.AUTO: 6 [PH] (ref 4.5–8)
PLATELET # BLD AUTO: 281 THOUSANDS/UL (ref 149–390)
PMV BLD AUTO: 10.7 FL (ref 8.9–12.7)
POTASSIUM SERPL-SCNC: 3.8 MMOL/L (ref 3.5–5.3)
PROT SERPL-MCNC: 7.3 G/DL (ref 6.4–8.4)
PROT UR STRIP-MCNC: NEGATIVE MG/DL
PROTHROMBIN TIME: 13.3 SECONDS (ref 12.3–15)
RBC # BLD AUTO: 3.82 MILLION/UL (ref 3.81–5.12)
RBC #/AREA URNS AUTO: NORMAL /HPF
SODIUM SERPL-SCNC: 138 MMOL/L (ref 135–147)
SP GR UR STRIP.AUTO: 1.01 (ref 1–1.03)
UROBILINOGEN UR QL STRIP.AUTO: 0.2 E.U./DL
WBC # BLD AUTO: 6.41 THOUSAND/UL (ref 4.31–10.16)
WBC #/AREA URNS AUTO: NORMAL /HPF

## 2025-03-27 PROCEDURE — 83690 ASSAY OF LIPASE: CPT | Performed by: EMERGENCY MEDICINE

## 2025-03-27 PROCEDURE — 74177 CT ABD & PELVIS W/CONTRAST: CPT

## 2025-03-27 PROCEDURE — 81001 URINALYSIS AUTO W/SCOPE: CPT

## 2025-03-27 PROCEDURE — 85025 COMPLETE CBC W/AUTO DIFF WBC: CPT | Performed by: EMERGENCY MEDICINE

## 2025-03-27 PROCEDURE — 99284 EMERGENCY DEPT VISIT MOD MDM: CPT

## 2025-03-27 PROCEDURE — 36415 COLL VENOUS BLD VENIPUNCTURE: CPT | Performed by: EMERGENCY MEDICINE

## 2025-03-27 PROCEDURE — 85610 PROTHROMBIN TIME: CPT | Performed by: EMERGENCY MEDICINE

## 2025-03-27 PROCEDURE — 99285 EMERGENCY DEPT VISIT HI MDM: CPT | Performed by: EMERGENCY MEDICINE

## 2025-03-27 PROCEDURE — 80053 COMPREHEN METABOLIC PANEL: CPT | Performed by: EMERGENCY MEDICINE

## 2025-03-27 PROCEDURE — 81025 URINE PREGNANCY TEST: CPT | Performed by: EMERGENCY MEDICINE

## 2025-03-27 PROCEDURE — 85730 THROMBOPLASTIN TIME PARTIAL: CPT | Performed by: EMERGENCY MEDICINE

## 2025-03-27 PROCEDURE — 96374 THER/PROPH/DIAG INJ IV PUSH: CPT

## 2025-03-27 RX ORDER — CYCLOBENZAPRINE HCL 10 MG
10 TABLET ORAL 2 TIMES DAILY PRN
Qty: 20 TABLET | Refills: 0 | Status: SHIPPED | OUTPATIENT
Start: 2025-03-27

## 2025-03-27 RX ORDER — KETOROLAC TROMETHAMINE 30 MG/ML
15 INJECTION, SOLUTION INTRAMUSCULAR; INTRAVENOUS ONCE
Status: COMPLETED | OUTPATIENT
Start: 2025-03-27 | End: 2025-03-27

## 2025-03-27 RX ORDER — IBUPROFEN 400 MG/1
400 TABLET, FILM COATED ORAL EVERY 6 HOURS PRN
Qty: 30 TABLET | Refills: 0 | Status: SHIPPED | OUTPATIENT
Start: 2025-03-27

## 2025-03-27 RX ORDER — ACETAMINOPHEN 325 MG/1
650 TABLET ORAL ONCE
Status: COMPLETED | OUTPATIENT
Start: 2025-03-27 | End: 2025-03-27

## 2025-03-27 RX ADMIN — IOHEXOL 100 ML: 350 INJECTION, SOLUTION INTRAVENOUS at 11:49

## 2025-03-27 RX ADMIN — ACETAMINOPHEN 650 MG: 325 TABLET, FILM COATED ORAL at 10:43

## 2025-03-27 RX ADMIN — KETOROLAC TROMETHAMINE 15 MG: 30 INJECTION, SOLUTION INTRAMUSCULAR; INTRAVENOUS at 10:44

## 2025-03-27 NOTE — Clinical Note
Gi Monroe was seen and treated in our emergency department on 3/27/2025.                Diagnosis:     Gi  .    She may return on this date: 03/31/2025         If you have any questions or concerns, please don't hesitate to call.      Maxwell Marin MD    ______________________________           _______________          _______________  Hospital Representative                              Date                                Time

## 2025-03-27 NOTE — ED PROVIDER NOTES
Time reflects when diagnosis was documented in both MDM as applicable and the Disposition within this note       Time User Action Codes Description Comment    3/27/2025  1:37 PM Maxwell Marin Add [M54.50] Right low back pain     3/27/2025  1:37 PM Maxwell Marin Add [I82.891] Chronic thrombosis of ovarian vein           ED Disposition       ED Disposition   Discharge    Condition   Stable    Date/Time   Thu Mar 27, 2025  1:38 PM    Comment   Gi Monroe discharge to home/self care.                   Assessment & Plan       Medical Decision Making  Amount and/or Complexity of Data Reviewed  Labs: ordered.  Radiology: ordered.    Risk  OTC drugs.  Prescription drug management.    CT scan was performed to rule out the possibility of kidney stone or renal infarction given the patient's flank discomfort for some time and there does not appear to be any evidence of an acute process or inflammatory changes, no kidney stone no hydronephrosis no masses.  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  She has no radicular symptoms.  There is no obvious abnormalities on the CT of the lumbar spine.  She has no UTI.  Patient needs follow-up with the comprehensive [facility] and we can try some NSAIDs for treatment as well as a muscle relaxer.  I also wrote her a note for work.         Medications   ketorolac (TORADOL) injection 15 mg (15 mg Intravenous Given 3/27/25 1044)   acetaminophen (TYLENOL) tablet 650 mg (650 mg Oral Given 3/27/25 1043)   iohexol (OMNIPAQUE) 350 MG/ML injection (MULTI-DOSE) 100 mL (100 mL Intravenous Given 3/27/25 1149)       ED Risk Strat Scores                            SBIRT 20yo+      Flowsheet Row Most Recent Value   Initial Alcohol Screen: US AUDIT-C     1. How often do you have a drink containing alcohol? 0  Filed at: 03/27/2025 1017   2. How many drinks containing alcohol do you have on a typical day you are drinking?  0 Filed at: 03/27/2025 1017   3a. Male UNDER 65: How often do you have five or more drinks on one occasion? 0 Filed at: 03/27/2025 1017   3b. FEMALE Any Age, or MALE 65+: How often do you have 4 or more drinks on one occassion? 0 Filed at: 03/27/2025 1017   Audit-C Score 0 Filed at: 03/27/2025 1017   ALISIA: How many times in the past year have you...    Used an illegal drug or used a prescription medication for non-medical reasons? Never Filed at: 03/27/2025 1017                            History of Present Illness       Chief Complaint   Patient presents with    Abdominal Pain     Right sided ab pain for one month, worsening today       Past Medical History:   Diagnosis Date    No known health problems       Past Surgical History:   Procedure Laterality Date    DE LAPAROSCOPY SURG CHOLECYSTECTOMY N/A 8/21/2024    Procedure: CHOLECYSTECTOMY LAPAROSCOPIC  ROBOTIC ASSISTED;  Surgeon: Jia Ge MD;  Location: [facility];  Service: General    SOFT TISSUE CYST EXCISION      head-2023      Family History   Problem Relation Age of Onset    No Known Problems Mother     No Known Problems Father       Social History     Tobacco Use    Smoking status: Never     Passive exposure: Never    Smokeless tobacco: Never   Vaping Use    Vaping status: Never Used   Substance Use Topics    Alcohol use: No    Drug use: No      E-Cigarette/Vaping    E-Cigarette Use Never User       E-Cigarette/Vaping Substances    Nicotine No     THC No     CBD No     Flavoring No     Other No     Unknown No       I have reviewed and agree with the history as documented.     HPI  Patient states that for the last month she has been having right-sided flank pain which radiates towards the right mid to lower abdomen and it just got worse over the last week.  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  She does not complain of radicular symptoms.  She has no hematuria.  Patient has had her gallbladder removed and has had a history of pancreatitis.  She does not drink alcohol.  There is been no rash.  No fevers or chills.  She has no dysuria or hematuria.  Review of Systems        Objective       ED Triage Vitals   Temperature Pulse Blood Pressure Respirations SpO2 Patient Position - Orthostatic VS   03/27/25 1008 03/27/25 1008 03/27/25 1008 03/27/25 1008 03/27/25 1008 03/27/25 1247   98.9 °F (37.2 °C) 104 126/73 17 99 % Lying      Temp src Heart Rate Source BP Location FiO2 (%) Pain Score    -- 03/27/25 1008 03/27/25 1247 -- 03/27/25 1008     Monitor Right arm  7      Vitals      Date and Time Temp Pulse SpO2 Resp BP Pain Score FACES Pain Rating User   03/27/25 1247 -- 75 100 % 18 131/64 -- -- JK   03/27/25 1044 -- -- -- -- -- 8 -- KD   03/27/25 1043 -- -- -- -- -- 8 -- KD   03/27/25 1008 98.9 °F (37.2 °C) 104 99 % 17 126/73 7 -- KD            Physical Exam  Vitals and nursing note reviewed.   Constitutional:       General: She is not in acute distress.     Appearance: Normal appearance. She is well-developed. She is not ill-appearing, toxic-appearing or diaphoretic.   HENT:      Head: Normocephalic and atraumatic.      Right Ear: Hearing normal. No drainage or swelling.      Left Ear: Hearing normal. No drainage or swelling.   Eyes:      General: Lids are normal.         Right eye: No discharge.         Left eye: No discharge.      Conjunctiva/sclera: Conjunctivae normal.   Neck:      Vascular: No JVD.      Trachea: Trachea normal.   Cardiovascular:      Rate and Rhythm: Normal rate and regular rhythm.      Pulses: Normal pulses.      Heart sounds: Normal heart sounds. No murmur heard.     No friction rub. No gallop.   Pulmonary:      Effort: Pulmonary effort is normal. No respiratory distress.      Breath sounds: Normal breath sounds. No stridor. No wheezing or rales.   Chest:      Chest  wall: No tenderness.   Abdominal:      Palpations: Abdomen is soft.      Tenderness: There is abdominal tenderness in the right lower quadrant and periumbilical area. There is right CVA tenderness. There is no left CVA tenderness, guarding or rebound.      Hernia: No hernia is present.   Musculoskeletal:         General: Normal range of motion.      Cervical back: Normal and normal range of motion.      Thoracic back: Normal.      Lumbar back: No bony tenderness. Negative right straight leg raise test and negative left straight leg raise test.        Back:    Skin:     General: Skin is warm and dry.      Coloration: Skin is not pale.      Findings: No rash.   Neurological:      General: No focal deficit present.      Mental Status: She is alert.      GCS: GCS eye subscore is 4. GCS verbal subscore is 5. GCS motor subscore is 6.      Sensory: No sensory deficit.      Motor: No weakness or abnormal muscle tone.   Psychiatric:         Mood and Affect: Mood normal.         Speech: Speech normal.         Behavior: Behavior is cooperative.         Results Reviewed       Procedure Component Value Units Date/Time    Comprehensive metabolic panel [[id number]]  (Abnormal) Collected: 03/27/25 1038    Lab Status: Final result Specimen: Blood from Arm, Left Updated: 03/27/25 1111     Sodium 138 mmol/L      Potassium 3.8 mmol/L      Chloride 108 mmol/L      CO2 24 mmol/L      ANION GAP 6 mmol/L      BUN 13 mg/dL      Creatinine 0.51 mg/dL      Glucose 95 mg/dL      Calcium 9.2 mg/dL      AST 13 U/L      ALT 12 U/L      Alkaline Phosphatase 71 U/L      Total Protein 7.3 g/dL      Albumin 4.4 g/dL      Total Bilirubin 0.31 mg/dL      eGFR 130 ml/min/1.73sq m     Narrative:      National Kidney Disease Foundation guidelines for Chronic Kidney Disease (CKD):     Stage 1 with normal or high GFR (GFR > 90 mL/min/1.73 square meters)    Stage 2 Mild CKD (GFR = 60-89 mL/min/1.73 square meters)    Stage 3A Moderate CKD (GFR = 45-59  mL/min/1.73 square meters)    Stage 3B Moderate CKD (GFR = 30-44 mL/min/1.73 square meters)    Stage 4 Severe CKD (GFR = 15-29 mL/min/1.73 square meters)    Stage 5 End Stage CKD (GFR <15 mL/min/1.73 square meters)  Note: GFR calculation is accurate only with a steady state creatinine    Lipase [[id number]]  (Normal) Collected: 03/27/25 1038    Lab Status: Final result Specimen: Blood from Arm, Left Updated: 03/27/25 1111     Lipase 20 u/L     Urine Microscopic [[id number]]  (Normal) Collected: 03/27/25 1040    Lab Status: Final result Specimen: Urine, Clean Catch Updated: 03/27/25 1111     RBC, UA 1-2 /hpf      WBC, UA None Seen /hpf      Epithelial Cells Occasional /hpf      Bacteria, UA Occasional /hpf     Protime-INR [[id number]]  (Normal) Collected: 03/27/25 1038    Lab Status: Final result Specimen: Blood from Arm, Left Updated: 03/27/25 1107     Protime 13.3 seconds      INR 0.99    Narrative:      INR Therapeutic Range    Indication                                             INR Range      Atrial Fibrillation                                               2.0-3.0  Hypercoagulable State                                    2.0.2.3  Left Ventricular Asist Device                            2.0-3.0  Mechanical Heart Valve                                  -    Aortic(with afib, MI, embolism, HF, LA enlargement,    and/or coagulopathy)                                     2.0-3.0 (2.5-3.5)     Mitral                                                             2.5-3.5  Prosthetic/Bioprosthetic Heart Valve               2.0-3.0  Venous thromboembolism (VTE: VT, PE        2.0-3.0    APTT [[id number]]  (Normal) Collected: 03/27/25 1038    Lab Status: Final result Specimen: Blood from Arm, Left Updated: 03/27/25 1107     PTT 30 seconds     CBC and differential [[id number]] Collected: 03/27/25 1038    Lab Status: Final result Specimen: Blood from Arm, Left Updated: 03/27/25 1051     WBC 6.41 Thousand/uL      RBC 3.82 Million/uL       Hemoglobin 12.0 g/dL      Hematocrit 34.9 %      MCV 91 fL      MCH 31.4 pg      MCHC 34.4 g/dL      RDW 12.4 %      MPV 10.7 fL      Platelets 281 Thousands/uL      nRBC 0 /100 WBCs      Segmented % 59 %      Immature Grans % 0 %      Lymphocytes % 33 %      Monocytes % 6 %      Eosinophils Relative 1 %      Basophils Relative 1 %      Absolute Neutrophils 3.88 Thousands/µL      Absolute Immature Grans 0.02 Thousand/uL      Absolute Lymphocytes 2.09 Thousands/µL      Absolute Monocytes 0.35 Thousand/µL      Eosinophils Absolute 0.04 Thousand/µL      Basophils Absolute 0.03 Thousands/µL     POCT pregnancy, urine [[id number]]  (Normal) Collected: 03/27/25 1042    Lab Status: Final result Updated: 03/27/25 1049     EXT Preg Test, Ur Negative     Control Valid    Urine Macroscopic, POC [[id number]]  (Abnormal) Collected: 03/27/25 1040    Lab Status: Final result Specimen: Urine Updated: 03/27/25 1042     Color, UA Yellow     Clarity, UA Clear     pH, UA 6.0     Leukocytes, UA Trace     Nitrite, UA Negative     Protein, UA Negative mg/dl      Glucose, UA Negative mg/dl      Ketones, UA Negative mg/dl      Urobilinogen, UA 0.2 E.U./dl      Bilirubin, UA Negative     Occult Blood, UA Large     Specific Gravity, UA 1.010    Narrative:      [facility] RESULT            CT abdomen pelvis with contrast   Final Interpretation by Catracho Mitchell MD (03/27 1255)      Underdistended bladder with mild nonspecific circumferential bladder wall thickening. Recommend correlation with urinalysis to exclude cystitis.      Unchanged chronic bilateral ovarian vein thrombosis.      Additional chronic/incidental findings as detailed above.      The study was marked in EPIC for immediate notification.         Workstation performed: [id number]             Procedures    ED Medication and Procedure Management   Prior to Admission Medications   Prescriptions Last Dose Informant Patient Reported? Taking?   etonogestrel (Nexplanon)  subdermal implant  Self Yes No   Sig: Inject 1 each under the skin   lidocaine (Lidoderm) 5 %   No No   Sig: Apply 1 patch topically over 12 hours daily Remove & Discard patch within 12 hours or as directed by MD   naproxen (Naprosyn) 500 mg tablet   No No   Sig: Take 1 tablet (500 mg total) by mouth 2 (two) times a day with meals for 7 days   nitrofurantoin (MACROBID) 100 mg capsule   No No   Sig: Take 1 capsule (100 mg total) by mouth 2 (two) times a day   pantoprazole (PROTONIX) 40 mg tablet   No No   Sig: Take 1 tablet (40 mg total) by mouth daily   phenazopyridine (PYRIDIUM) 200 mg tablet   No No   Sig: Take 1 tablet (200 mg total) by mouth 3 (three) times a day      Facility-Administered Medications: None     Discharge Medication List as of 3/27/2025  1:40 PM        START taking these medications    Details   cyclobenzaprine (FLEXERIL) 10 mg tablet Take 1 tablet (10 mg total) by mouth 2 (two) times a day as needed for muscle spasms, Starting u 3/27/2025, Normal      ibuprofen (MOTRIN) 400 mg tablet Take 1 tablet (400 mg total) by mouth every 6 (six) hours as needed for mild pain, Starting u 3/27/2025, Normal           CONTINUE these medications which have NOT CHANGED    Details   etonogestrel (Nexplanon) subdermal implant Inject 1 each under the skin, Historical Med      lidocaine (Lidoderm) 5 % Apply 1 patch topically over 12 hours daily Remove & Discard patch within 12 hours or as directed by MD, Starting Wed 3/19/2025, Normal      naproxen (Naprosyn) 500 mg tablet Take 1 tablet (500 mg total) by mouth 2 (two) times a day with meals for 7 days, Starting Wed 3/19/2025, Until Wed 3/26/2025, Normal      nitrofurantoin (MACROBID) 100 mg capsule Take 1 capsule (100 mg total) by mouth 2 (two) times a day, Starting Fri 12/6/2024, Normal      pantoprazole (PROTONIX) 40 mg tablet Take 1 tablet (40 mg total) by mouth daily, Starting Tue 12/3/2024, Until Sat 2/1/2025, Normal      phenazopyridine (PYRIDIUM) 200 mg  tablet Take 1 tablet (200 mg total) by mouth 3 (three) times a day, Starting Fri 12/6/2024, Normal             ED SEPSIS DOCUMENTATION   Time reflects when diagnosis was documented in both MDM as applicable and the Disposition within this note       Time User Action Codes Description Comment    3/27/2025  1:37 PM Maxwell Marin [M54.50] Right low back pain     3/27/2025  1:37 PM Maxwell Marin [I82.891] Chronic thrombosis of ovarian vein                  Maxwell Marin MD  03/27/25 1647

## 2025-03-28 ENCOUNTER — NURSE TRIAGE (OUTPATIENT)
Dept: PHYSICAL THERAPY | Facility: OTHER | Age: 30
End: 2025-03-28

## 2025-03-28 NOTE — TELEPHONE ENCOUNTER
Called the patient to complete the triage process started today 12:20 pm. Call went to .    Voice message left for patient to call back. Phone number and hours of business provided.     Referral Closed.  Triage will be completed when a call back is received.

## 2025-03-28 NOTE — TELEPHONE ENCOUNTER
Additional Information   Negative: Is this related to a work injury?   Negative: Is this related to an MVA?   Negative: Are you currently recieving homecare services?    Background - Initial Assessment  Clinical complaint: Pain is right low back radiates to the right lower abdomen area. No other radiation. No numbness or tingling. Started 1 month ago. NKI. Pain worsened this last 1 week.Pt did not have a work injury, but states was at work when pain increased.  This is a new issue, has never had prior. Pain is worsened with movements. Pain is constant and can be sharp and burning. Seen in ED 3/27/25  Date of onset: 1 month  Frequency of pain: constant  Quality of pain: burning and sharp    Protocols used: [facility] Protocol

## 2025-04-22 DIAGNOSIS — R10.12 LUQ PAIN: ICD-10-CM

## 2025-04-22 RX ORDER — PANTOPRAZOLE SODIUM 40 MG/1
40 TABLET, DELAYED RELEASE ORAL DAILY
Qty: 30 TABLET | Refills: 5 | Status: SHIPPED | OUTPATIENT
Start: 2025-04-22

## 2025-05-07 ENCOUNTER — HOSPITAL ENCOUNTER (EMERGENCY)
Facility: HOSPITAL | Age: 30
Discharge: HOME | End: 2025-05-07
Attending: EMERGENCY MEDICINE
Payer: MEDICARE

## 2025-05-07 VITALS
RESPIRATION RATE: 17 BRPM | HEART RATE: 76 BPM | TEMPERATURE: 98.4 F | SYSTOLIC BLOOD PRESSURE: 123 MMHG | OXYGEN SATURATION: 99 % | DIASTOLIC BLOOD PRESSURE: 80 MMHG

## 2025-05-07 DIAGNOSIS — R10.12 LUQ PAIN: ICD-10-CM

## 2025-05-07 DIAGNOSIS — R10.9 ABDOMINAL PAIN: Primary | ICD-10-CM

## 2025-05-07 DIAGNOSIS — K43.9 VENTRAL HERNIA: ICD-10-CM

## 2025-05-07 LAB
ALBUMIN SERPL BCG-MCNC: 4.5 G/DL (ref 3.5–5)
ALP SERPL-CCNC: 78 U/L (ref 34–104)
ALT SERPL W P-5'-P-CCNC: 12 U/L (ref 7–52)
ANION GAP SERPL CALCULATED.3IONS-SCNC: 5 MMOL/L (ref 4–13)
AST SERPL W P-5'-P-CCNC: 12 U/L (ref 13–39)
BACTERIA UR QL AUTO: ABNORMAL /HPF
BASOPHILS # BLD AUTO: 0.03 THOUSANDS/ÂΜL (ref 0–0.1)
BASOPHILS NFR BLD AUTO: 0 % (ref 0–1)
BILIRUB SERPL-MCNC: 0.65 MG/DL (ref 0.2–1)
BILIRUB UR QL STRIP: NEGATIVE
BUN SERPL-MCNC: 8 MG/DL (ref 5–25)
CALCIUM SERPL-MCNC: 9.4 MG/DL (ref 8.4–10.2)
CHLORIDE SERPL-SCNC: 105 MMOL/L (ref 96–108)
CLARITY UR: CLEAR
CO2 SERPL-SCNC: 26 MMOL/L (ref 21–32)
COLOR UR: YELLOW
CREAT SERPL-MCNC: 0.58 MG/DL (ref 0.6–1.3)
EOSINOPHIL # BLD AUTO: 0.05 THOUSAND/ÂΜL (ref 0–0.61)
EOSINOPHIL NFR BLD AUTO: 1 % (ref 0–6)
ERYTHROCYTE [DISTWIDTH] IN BLOOD BY AUTOMATED COUNT: 12.1 % (ref 11.6–15.1)
EXT PREGNANCY TEST URINE: NEGATIVE
EXT. CONTROL: NORMAL
GFR SERPL CREATININE-BSD FRML MDRD: 124 ML/MIN/1.73SQ M
GLUCOSE SERPL-MCNC: 106 MG/DL (ref 65–140)
GLUCOSE UR STRIP-MCNC: NEGATIVE MG/DL
HCT VFR BLD AUTO: 37.6 % (ref 34.8–46.1)
HGB BLD-MCNC: 12.6 G/DL (ref 11.5–15.4)
HGB UR QL STRIP.AUTO: ABNORMAL
IMM GRANULOCYTES # BLD AUTO: 0.02 THOUSAND/UL (ref 0–0.2)
IMM GRANULOCYTES NFR BLD AUTO: 0 % (ref 0–2)
KETONES UR STRIP-MCNC: NEGATIVE MG/DL
LEUKOCYTE ESTERASE UR QL STRIP: ABNORMAL
LIPASE SERPL-CCNC: 11 U/L (ref 11–82)
LYMPHOCYTES # BLD AUTO: 2.75 THOUSANDS/ÂΜL (ref 0.6–4.47)
LYMPHOCYTES NFR BLD AUTO: 35 % (ref 14–44)
MCH RBC QN AUTO: 30.9 PG (ref 26.8–34.3)
MCHC RBC AUTO-ENTMCNC: 33.5 G/DL (ref 31.4–37.4)
MCV RBC AUTO: 92 FL (ref 82–98)
MONOCYTES # BLD AUTO: 0.38 THOUSAND/ÂΜL (ref 0.17–1.22)
MONOCYTES NFR BLD AUTO: 5 % (ref 4–12)
MUCOUS THREADS UR QL AUTO: ABNORMAL
NEUTROPHILS # BLD AUTO: 4.62 THOUSANDS/ÂΜL (ref 1.85–7.62)
NEUTS SEG NFR BLD AUTO: 59 % (ref 43–75)
NITRITE UR QL STRIP: NEGATIVE
NON-SQ EPI CELLS URNS QL MICRO: ABNORMAL /HPF
NRBC BLD AUTO-RTO: 0 /100 WBCS
OTHER STN SPEC: ABNORMAL
PH UR STRIP.AUTO: 5.5 [PH] (ref 4.5–8)
PLATELET # BLD AUTO: 292 THOUSANDS/UL (ref 149–390)
PMV BLD AUTO: 10.8 FL (ref 8.9–12.7)
POTASSIUM SERPL-SCNC: 3.7 MMOL/L (ref 3.5–5.3)
PROT SERPL-MCNC: 7.8 G/DL (ref 6.4–8.4)
PROT UR STRIP-MCNC: NEGATIVE MG/DL
RBC # BLD AUTO: 4.08 MILLION/UL (ref 3.81–5.12)
RBC #/AREA URNS AUTO: ABNORMAL /HPF
SODIUM SERPL-SCNC: 136 MMOL/L (ref 135–147)
SP GR UR STRIP.AUTO: 1.01 (ref 1–1.03)
UROBILINOGEN UR QL STRIP.AUTO: 0.2 E.U./DL
WBC # BLD AUTO: 7.85 THOUSAND/UL (ref 4.31–10.16)
WBC #/AREA URNS AUTO: ABNORMAL /HPF

## 2025-05-07 PROCEDURE — 81025 URINE PREGNANCY TEST: CPT | Performed by: EMERGENCY MEDICINE

## 2025-05-07 PROCEDURE — 85025 COMPLETE CBC W/AUTO DIFF WBC: CPT | Performed by: EMERGENCY MEDICINE

## 2025-05-07 PROCEDURE — 81001 URINALYSIS AUTO W/SCOPE: CPT

## 2025-05-07 PROCEDURE — 83690 ASSAY OF LIPASE: CPT | Performed by: EMERGENCY MEDICINE

## 2025-05-07 PROCEDURE — 99284 EMERGENCY DEPT VISIT MOD MDM: CPT

## 2025-05-07 PROCEDURE — 99284 EMERGENCY DEPT VISIT MOD MDM: CPT | Performed by: EMERGENCY MEDICINE

## 2025-05-07 PROCEDURE — 96360 HYDRATION IV INFUSION INIT: CPT

## 2025-05-07 PROCEDURE — 96361 HYDRATE IV INFUSION ADD-ON: CPT

## 2025-05-07 PROCEDURE — 36415 COLL VENOUS BLD VENIPUNCTURE: CPT | Performed by: EMERGENCY MEDICINE

## 2025-05-07 PROCEDURE — 80053 COMPREHEN METABOLIC PANEL: CPT | Performed by: EMERGENCY MEDICINE

## 2025-05-07 RX ORDER — LIDOCAINE HYDROCHLORIDE 20 MG/ML
15 SOLUTION OROPHARYNGEAL ONCE
Status: COMPLETED | OUTPATIENT
Start: 2025-05-07 | End: 2025-05-07

## 2025-05-07 RX ORDER — MAGNESIUM HYDROXIDE/ALUMINUM HYDROXICE/SIMETHICONE 120; 1200; 1200 MG/30ML; MG/30ML; MG/30ML
30 SUSPENSION ORAL ONCE
Status: COMPLETED | OUTPATIENT
Start: 2025-05-07 | End: 2025-05-07

## 2025-05-07 RX ORDER — PANTOPRAZOLE SODIUM 40 MG/1
40 TABLET, DELAYED RELEASE ORAL DAILY
Qty: 30 TABLET | Refills: 0 | Status: SHIPPED | OUTPATIENT
Start: 2025-05-07

## 2025-05-07 RX ORDER — SUCRALFATE ORAL 1 G/10ML
1 SUSPENSION ORAL 4 TIMES DAILY
Qty: 420 ML | Refills: 0 | Status: SHIPPED | OUTPATIENT
Start: 2025-05-07 | End: 2025-05-14

## 2025-05-07 RX ADMIN — SODIUM CHLORIDE 1000 ML: 0.9 INJECTION, SOLUTION INTRAVENOUS at 09:52

## 2025-05-07 RX ADMIN — LIDOCAINE HYDROCHLORIDE 15 ML: 20 SOLUTION ORAL at 10:23

## 2025-05-07 RX ADMIN — ALUMINUM HYDROXIDE, MAGNESIUM HYDROXIDE, AND SIMETHICONE 30 ML: 200; 200; 20 SUSPENSION ORAL at 10:23

## 2025-05-07 NOTE — ED PROVIDER NOTES
Time reflects when diagnosis was documented in both MDM as applicable and the Disposition within this note       Time User Action Codes Description Comment    5/7/2025  1:31 PM Bryn Gottlieb Add [R10.9] Abdominal pain     5/7/2025  1:32 PM Bryn Gottlieb Add [K43.9] Ventral hernia     5/7/2025  1:32 PM Bryn Gottlieb Add [R10.12] LUQ pain           ED Disposition       ED Disposition   Discharge    Condition   Stable    Date/Time   Wed May 7, 2025  1:31 PM    Comment   Gi Monroe discharge to home/self care.                   Assessment & Plan       Medical Decision Making  Acute abdominal pain w/ hx of cholecystectomy/appendectomy w/ benign abdominal exam-likelihood of acute surgical pahtology such as but not limited to perforate viscus, small bowel obstruction is extremely unlikely and imaging is not indicated, will do abdominal labs to r/o hepatitis, pancreatitis, urine dip to r/o uti/kidney stone/pyelonephritis, upreg to r/o pregnancy complicaition    Amount and/or Complexity of Data Reviewed  Labs: ordered.    Risk  OTC drugs.  Prescription drug management.        ED Course as of 05/07/25 1334   Wed May 07, 2025   1329 Meidcal work up reviewed and benign, will tx for gastritis, gi f/u, urine pending culture       Medications   sodium chloride 0.9 % bolus 1,000 mL (0 mL Intravenous Stopped 5/7/25 1234)   aluminum-magnesium hydroxide-simethicone (MAALOX) oral suspension 30 mL (30 mL Oral Given 5/7/25 1023)   Lidocaine Viscous HCl (XYLOCAINE) 2 % mucosal solution 15 mL (15 mL Swish & Spit Given 5/7/25 1023)       ED Risk Strat Scores                    No data recorded        SBIRT 20yo+      Flowsheet Row Most Recent Value   Initial Alcohol Screen: US AUDIT-C     1. How often do you have a drink containing alcohol? 0 Filed at: 05/07/2025 1036   2. How many drinks containing alcohol do you have on a typical day you are drinking?  0 Filed at: 05/07/2025 1036   3a. Male UNDER 65: How often do you have five or  more drinks on one occasion? 0 Filed at: 05/07/2025 1036   3b. FEMALE Any Age, or MALE 65+: How often do you have 4 or more drinks on one occassion? 0 Filed at: 05/07/2025 1036   Audit-C Score 0 Filed at: 05/07/2025 1036   ALISIA: How many times in the past year have you...    Used an illegal drug or used a prescription medication for non-medical reasons? Never Filed at: 05/07/2025 1036                            History of Present Illness       Chief Complaint   Patient presents with    Abdominal Pain     Pt c/o RUQ and mid abdominal pain that started yesterday. Pt denies n/v/d       Past Medical History:   Diagnosis Date    No known health problems       Past Surgical History:   Procedure Laterality Date    MS LAPAROSCOPY SURG CHOLECYSTECTOMY N/A 8/21/2024    Procedure: CHOLECYSTECTOMY LAPAROSCOPIC  ROBOTIC ASSISTED;  Surgeon: Jia Ge MD;  Location: [facility] OR;  Service: General    SOFT TISSUE CYST EXCISION      head-2023      Family History   Problem Relation Age of Onset    No Known Problems Mother     No Known Problems Father       Social History     Tobacco Use    Smoking status: Never     Passive exposure: Never    Smokeless tobacco: Never   Vaping Use    Vaping status: Never Used   Substance Use Topics    Alcohol use: No    Drug use: No      E-Cigarette/Vaping    E-Cigarette Use Never User       E-Cigarette/Vaping Substances    Nicotine No     THC No     CBD No     Flavoring No     Other No     Unknown No       I have reviewed and agree with the history as documented.       History provided by:  Patient  Abdominal Pain  Pain location:  RUQ, epigastric and R flank  Pain quality: no pressure    Pain radiates to:  Does not radiate  Pain severity:  Moderate  Onset quality:  Gradual  Duration:  6 days  Timing:  Constant  Progression:  Worsening  Chronicity:  New  Relieved by:  Nothing  Worsened by:  Nothing  Ineffective treatments:  NSAIDs  Associated symptoms: no anorexia, no belching, no chest pain, no  constipation, no diarrhea, no dysuria, no fatigue, no fever, no hematemesis, no hematochezia, no hematuria, no nausea, no shortness of breath, no vaginal bleeding, no vaginal discharge and no vomiting        Review of Systems   Constitutional:  Negative for appetite change, diaphoresis, fatigue and fever.   HENT:  Negative for congestion, dental problem and rhinorrhea.    Eyes:  Negative for pain.   Respiratory:  Negative for chest tightness and shortness of breath.    Cardiovascular:  Negative for chest pain and leg swelling.   Gastrointestinal:  Positive for abdominal pain. Negative for anorexia, blood in stool, constipation, diarrhea, hematemesis, hematochezia, nausea and vomiting.   Endocrine: Negative for polydipsia, polyphagia and polyuria.   Genitourinary:  Negative for difficulty urinating, dyspareunia, dysuria, enuresis, flank pain, frequency, hematuria, pelvic pain, vaginal bleeding and vaginal discharge.   Musculoskeletal:  Negative for arthralgias, back pain and myalgias.   Skin:  Negative for color change and rash.   Neurological:  Negative for dizziness, weakness, light-headedness and headaches.   Psychiatric/Behavioral:  Negative for hallucinations and suicidal ideas.            Objective       ED Triage Vitals [05/07/25 0941]   Temperature Pulse Blood Pressure Respirations SpO2 Patient Position - Orthostatic VS   98.4 °F (36.9 °C) 79 125/79 17 99 % Sitting      Temp src Heart Rate Source BP Location FiO2 (%) Pain Score    -- -- Left arm -- --      Vitals      Date and Time Temp Pulse SpO2 Resp BP Pain Score FACES Pain Rating User   05/07/25 1240 -- 76 99 % 17 123/80 -- -- BA   05/07/25 0941 98.4 °F (36.9 °C) 79 99 % 17 125/79 -- -- BA            Physical Exam  Constitutional:       Appearance: She is well-developed.   HENT:      Head: Normocephalic and atraumatic.   Eyes:      Pupils: Pupils are equal, round, and reactive to light.   Neck:      Vascular: No JVD.      Trachea: No tracheal deviation.    Cardiovascular:      Rate and Rhythm: Normal rate and regular rhythm.   Pulmonary:      Effort: Pulmonary effort is normal. No tachypnea, accessory muscle usage or respiratory distress.      Breath sounds: Normal breath sounds.   Abdominal:      General: There is no distension.      Tenderness: There is no abdominal tenderness. There is no right CVA tenderness, left CVA tenderness, guarding or rebound.      Hernia: A hernia is present. Hernia is present in the ventral area (easily reducible, nttp, no skin changes).   Musculoskeletal:      Right lower leg: Normal.      Left lower leg: Normal.   Skin:     General: Skin is warm.      Capillary Refill: Capillary refill takes less than 2 seconds.   Neurological:      Mental Status: She is alert and oriented to person, place, and time.   Psychiatric:         Behavior: Behavior normal.         Results Reviewed       Procedure Component Value Units Date/Time    POCT pregnancy, urine [[id number]]  (Normal) Collected: 05/07/25 1234    Lab Status: Final result Updated: 05/07/25 1235     EXT Preg Test, Ur Negative     Control Valid    Urine Macroscopic, POC [[id number]]  (Abnormal) Collected: 05/07/25 1232    Lab Status: Final result Specimen: Urine Updated: 05/07/25 1234     Color, UA Yellow     Clarity, UA Clear     pH, UA 5.5     Leukocytes, UA Small     Nitrite, UA Negative     Protein, UA Negative mg/dl      Glucose, UA Negative mg/dl      Ketones, UA Negative mg/dl      Urobilinogen, UA 0.2 E.U./dl      Bilirubin, UA Negative     Occult Blood, UA Small     Specific [place], UA 1.010    Narrative:      CLINITEK RESULT    Urine Microscopic [[id number]] Collected: 05/07/25 1232    Lab Status: No result Specimen: Urine, Clean Catch     Comprehensive metabolic panel [[id number]]  (Abnormal) Collected: 05/07/25 0953    Lab Status: Final result Specimen: Blood from Arm, Left Updated: 05/07/25 1024     Sodium 136 mmol/L      Potassium 3.7 mmol/L      Chloride 105 mmol/L      CO2 26  mmol/L      ANION GAP 5 mmol/L      BUN 8 mg/dL      Creatinine 0.58 mg/dL      Glucose 106 mg/dL      Calcium 9.4 mg/dL      AST 12 U/L      ALT 12 U/L      Alkaline Phosphatase 78 U/L      Total Protein 7.8 g/dL      Albumin 4.5 g/dL      Total Bilirubin 0.65 mg/dL      eGFR 124 ml/min/1.73sq m     Narrative:      National Kidney Disease Foundation guidelines for Chronic Kidney Disease (CKD):     Stage 1 with normal or high GFR (GFR > 90 mL/min/1.73 square meters)    Stage 2 Mild CKD (GFR = 60-89 mL/min/1.73 square meters)    Stage 3A Moderate CKD (GFR = 45-59 mL/min/1.73 square meters)    Stage 3B Moderate CKD (GFR = 30-44 mL/min/1.73 square meters)    Stage 4 Severe CKD (GFR = 15-29 mL/min/1.73 square meters)    Stage 5 End Stage CKD (GFR <15 mL/min/1.73 square meters)  Note: GFR calculation is accurate only with a steady state creatinine    Lipase [[id number]]  (Normal) Collected: 05/07/25 0953    Lab Status: Final result Specimen: Blood from Arm, Left Updated: 05/07/25 1024     Lipase 11 u/L     CBC and differential [[id number]] Collected: 05/07/25 0953    Lab Status: Final result Specimen: Blood from Arm, Left Updated: 05/07/25 0958     WBC 7.85 Thousand/uL      RBC 4.08 Million/uL      Hemoglobin 12.6 g/dL      Hematocrit 37.6 %      MCV 92 fL      MCH 30.9 pg      MCHC 33.5 g/dL      RDW 12.1 %      MPV 10.8 fL      Platelets 292 Thousands/uL      nRBC 0 /100 WBCs      Segmented % 59 %      Immature Grans % 0 %      Lymphocytes % 35 %      Monocytes % 5 %      Eosinophils Relative 1 %      Basophils Relative 0 %      Absolute Neutrophils 4.62 Thousands/µL      Absolute Immature Grans 0.02 Thousand/uL      Absolute Lymphocytes 2.75 Thousands/µL      Absolute Monocytes 0.38 Thousand/µL      Eosinophils Absolute 0.05 Thousand/µL      Basophils Absolute 0.03 Thousands/µL             No orders to display       Procedures    ED Medication and Procedure Management   Prior to Admission Medications   Prescriptions  Last Dose Informant Patient Reported? Taking?   cyclobenzaprine (FLEXERIL) 10 mg tablet   No No   Sig: Take 1 tablet (10 mg total) by mouth 2 (two) times a day as needed for muscle spasms   etonogestrel (Nexplanon) subdermal implant  Self Yes No   Sig: Inject 1 each under the skin   ibuprofen (MOTRIN) 400 mg tablet   No No   Sig: Take 1 tablet (400 mg total) by mouth every 6 (six) hours as needed for mild pain   lidocaine (Lidoderm) 5 %   No No   Sig: Apply 1 patch topically over 12 hours daily Remove & Discard patch within 12 hours or as directed by MD   naproxen (Naprosyn) 500 mg tablet   No No   Sig: Take 1 tablet (500 mg total) by mouth 2 (two) times a day with meals for 7 days   nitrofurantoin (MACROBID) 100 mg capsule   No No   Sig: Take 1 capsule (100 mg total) by mouth 2 (two) times a day   pantoprazole (PROTONIX) 40 mg tablet   No No   Sig: TAKE 1 TABLET BY MOUTH EVERY DAY   pantoprazole (PROTONIX) 40 mg tablet   No Yes   Sig: Take 1 tablet (40 mg total) by mouth daily   phenazopyridine (PYRIDIUM) 200 mg tablet   No No   Sig: Take 1 tablet (200 mg total) by mouth 3 (three) times a day      Facility-Administered Medications: None     Patient's Medications   Discharge Prescriptions    SUCRALFATE (CARAFATE) 1 G/10 ML SUSPENSION    Take 10 mL (1 g total) by mouth 4 (four) times a day for 7 days       Start Date: 5/7/2025  End Date: 5/14/2025       Order Dose: 1 g       Quantity: 420 mL    Refills: 0     No discharge procedures on file.  ED SEPSIS DOCUMENTATION   Time reflects when diagnosis was documented in both MDM as applicable and the Disposition within this note       Time User Action Codes Description Comment    5/7/2025  1:31 PM Bryn Gottlieb Add [R10.9] Abdominal pain     5/7/2025  1:32 PM Bryn Gottlieb Add [K43.9] Ventral hernia     5/7/2025  1:32 PM Bryn Gottlieb Add [R10.12] LUQ pain                  Bryn Gottlieb MD  05/07/25 9678

## 2025-05-07 NOTE — Clinical Note
Gi Monroe was seen and treated in our emergency department on 5/7/2025.    No restrictions            Diagnosis:     Gi  may return to work on return date.    She may return on this date: 05/09/2025         If you have any questions or concerns, please don't hesitate to call.      Bryn Gottlieb MD    ______________________________           _______________          _______________  Hospital Representative                              Date                                Time

## 2025-07-12 ENCOUNTER — APPOINTMENT (EMERGENCY)
Dept: CT IMAGING | Facility: HOSPITAL | Age: 30
End: 2025-07-12
Payer: MEDICARE

## 2025-07-12 ENCOUNTER — HOSPITAL ENCOUNTER (EMERGENCY)
Facility: HOSPITAL | Age: 30
Discharge: HOME | End: 2025-07-12
Attending: EMERGENCY MEDICINE | Admitting: EMERGENCY MEDICINE
Payer: MEDICARE

## 2025-07-12 VITALS
SYSTOLIC BLOOD PRESSURE: 121 MMHG | WEIGHT: 183.42 LBS | OXYGEN SATURATION: 99 % | HEART RATE: 84 BPM | TEMPERATURE: 98.1 F | DIASTOLIC BLOOD PRESSURE: 66 MMHG | BODY MASS INDEX: 30.52 KG/M2 | RESPIRATION RATE: 18 BRPM

## 2025-07-12 DIAGNOSIS — N83.201 RIGHT OVARIAN CYST: ICD-10-CM

## 2025-07-12 DIAGNOSIS — K59.00 CONSTIPATION: ICD-10-CM

## 2025-07-12 DIAGNOSIS — R10.9 ABDOMINAL PAIN: Primary | ICD-10-CM

## 2025-07-12 LAB
ALBUMIN SERPL BCG-MCNC: 4.3 G/DL (ref 3.5–5)
ALP SERPL-CCNC: 78 U/L (ref 34–104)
ALT SERPL W P-5'-P-CCNC: 12 U/L (ref 7–52)
ANION GAP SERPL CALCULATED.3IONS-SCNC: 6 MMOL/L (ref 4–13)
AST SERPL W P-5'-P-CCNC: 12 U/L (ref 13–39)
BASOPHILS # BLD AUTO: 0.02 THOUSANDS/ÂΜL (ref 0–0.1)
BASOPHILS NFR BLD AUTO: 0 % (ref 0–1)
BILIRUB SERPL-MCNC: 0.79 MG/DL (ref 0.2–1)
BILIRUB UR QL STRIP: NEGATIVE
BUN SERPL-MCNC: 12 MG/DL (ref 5–25)
CALCIUM SERPL-MCNC: 9 MG/DL (ref 8.4–10.2)
CHLORIDE SERPL-SCNC: 107 MMOL/L (ref 96–108)
CLARITY UR: CLEAR
CO2 SERPL-SCNC: 23 MMOL/L (ref 21–32)
COLOR UR: YELLOW
CREAT SERPL-MCNC: 0.48 MG/DL (ref 0.6–1.3)
EOSINOPHIL # BLD AUTO: 0.05 THOUSAND/ÂΜL (ref 0–0.61)
EOSINOPHIL NFR BLD AUTO: 1 % (ref 0–6)
ERYTHROCYTE [DISTWIDTH] IN BLOOD BY AUTOMATED COUNT: 12.7 % (ref 11.6–15.1)
EXT PREGNANCY TEST URINE: NEGATIVE
EXT. CONTROL: NORMAL
GFR SERPL CREATININE-BSD FRML MDRD: 132 ML/MIN/1.73SQ M
GLUCOSE SERPL-MCNC: 91 MG/DL (ref 65–140)
GLUCOSE UR STRIP-MCNC: NEGATIVE MG/DL
HCT VFR BLD AUTO: 34.5 % (ref 34.8–46.1)
HGB BLD-MCNC: 12.3 G/DL (ref 11.5–15.4)
HGB UR QL STRIP.AUTO: NEGATIVE
IMM GRANULOCYTES # BLD AUTO: 0.02 THOUSAND/UL (ref 0–0.2)
IMM GRANULOCYTES NFR BLD AUTO: 0 % (ref 0–2)
KETONES UR STRIP-MCNC: NEGATIVE MG/DL
LEUKOCYTE ESTERASE UR QL STRIP: NEGATIVE
LIPASE SERPL-CCNC: 8 U/L (ref 11–82)
LYMPHOCYTES # BLD AUTO: 2.11 THOUSANDS/ÂΜL (ref 0.6–4.47)
LYMPHOCYTES NFR BLD AUTO: 28 % (ref 14–44)
MCH RBC QN AUTO: 31.7 PG (ref 26.8–34.3)
MCHC RBC AUTO-ENTMCNC: 35.7 G/DL (ref 31.4–37.4)
MCV RBC AUTO: 89 FL (ref 82–98)
MONOCYTES # BLD AUTO: 0.4 THOUSAND/ÂΜL (ref 0.17–1.22)
MONOCYTES NFR BLD AUTO: 5 % (ref 4–12)
NEUTROPHILS # BLD AUTO: 4.89 THOUSANDS/ÂΜL (ref 1.85–7.62)
NEUTS SEG NFR BLD AUTO: 66 % (ref 43–75)
NITRITE UR QL STRIP: NEGATIVE
NRBC BLD AUTO-RTO: 0 /100 WBCS
PH UR STRIP.AUTO: 5.5 [PH] (ref 4.5–8)
PLATELET # BLD AUTO: 256 THOUSANDS/UL (ref 149–390)
PMV BLD AUTO: 10.7 FL (ref 8.9–12.7)
POTASSIUM SERPL-SCNC: 3.7 MMOL/L (ref 3.5–5.3)
PROT SERPL-MCNC: 7.1 G/DL (ref 6.4–8.4)
PROT UR STRIP-MCNC: NEGATIVE MG/DL
RBC # BLD AUTO: 3.88 MILLION/UL (ref 3.81–5.12)
SODIUM SERPL-SCNC: 136 MMOL/L (ref 135–147)
SP GR UR STRIP.AUTO: >=1.03 (ref 1–1.03)
UROBILINOGEN UR QL STRIP.AUTO: 0.2 E.U./DL
WBC # BLD AUTO: 7.49 THOUSAND/UL (ref 4.31–10.16)

## 2025-07-12 PROCEDURE — 96374 THER/PROPH/DIAG INJ IV PUSH: CPT

## 2025-07-12 PROCEDURE — 81003 URINALYSIS AUTO W/O SCOPE: CPT

## 2025-07-12 PROCEDURE — 81025 URINE PREGNANCY TEST: CPT | Performed by: EMERGENCY MEDICINE

## 2025-07-12 PROCEDURE — 74177 CT ABD & PELVIS W/CONTRAST: CPT

## 2025-07-12 PROCEDURE — 99285 EMERGENCY DEPT VISIT HI MDM: CPT | Performed by: EMERGENCY MEDICINE

## 2025-07-12 PROCEDURE — 99284 EMERGENCY DEPT VISIT MOD MDM: CPT

## 2025-07-12 PROCEDURE — 36415 COLL VENOUS BLD VENIPUNCTURE: CPT | Performed by: EMERGENCY MEDICINE

## 2025-07-12 PROCEDURE — 80053 COMPREHEN METABOLIC PANEL: CPT | Performed by: EMERGENCY MEDICINE

## 2025-07-12 PROCEDURE — 85025 COMPLETE CBC W/AUTO DIFF WBC: CPT | Performed by: EMERGENCY MEDICINE

## 2025-07-12 PROCEDURE — 83690 ASSAY OF LIPASE: CPT | Performed by: EMERGENCY MEDICINE

## 2025-07-12 RX ORDER — POLYETHYLENE GLYCOL 3350 17 G/17G
17 POWDER, FOR SOLUTION ORAL DAILY
Qty: 510 G | Refills: 0 | Status: SHIPPED | OUTPATIENT
Start: 2025-07-12

## 2025-07-12 RX ORDER — KETOROLAC TROMETHAMINE 30 MG/ML
15 INJECTION, SOLUTION INTRAMUSCULAR; INTRAVENOUS ONCE
Status: COMPLETED | OUTPATIENT
Start: 2025-07-12 | End: 2025-07-12

## 2025-07-12 RX ADMIN — KETOROLAC TROMETHAMINE 15 MG: 30 INJECTION, SOLUTION INTRAMUSCULAR; INTRAVENOUS at 13:27

## 2025-07-12 RX ADMIN — IOHEXOL 100 ML: 350 INJECTION, SOLUTION INTRAVENOUS at 14:38

## 2025-07-12 NOTE — ED PROVIDER NOTES
Time reflects when diagnosis was documented in both MDM as applicable and the Disposition within this note       Time User Action Codes Description Comment    7/12/2025  4:48 PM Sheri Villa Add [R10.9] Abdominal pain     7/12/2025  4:48 PM Sheri Villa L Add [K59.00] Constipation     7/12/2025  4:48 PM Sheri Villa Add [N83.201] Right ovarian cyst           ED Disposition       ED Disposition   Discharge    Condition   Stable    Date/Time   Sat Jul 12, 2025  4:48 PM    Comment   Gi Monroe discharge to home/self care.                   Assessment & Plan       Medical Decision Making  Will check CBC as marker of infection/anemia, CMP to r/o hepatitis/biliary disease/electrolyte abnormalities/HUMPHREY, lipase to r/o pancreatitis, urine to r/o UTI, urine preg to r/o ectopic pregnancy, CT A/P to r/o appendicitis/colitis/diverticulitis/ureteral stone/ovarian cyst.    Amount and/or Complexity of Data Reviewed  Labs: ordered. Decision-making details documented in ED Course.  Radiology: ordered. Decision-making details documented in ED Course.    Risk  OTC drugs.  Prescription drug management.        ED Course as of 07/12/25 1658   Sat Jul 12, 2025   1303 Temperature: 98.1 °F (36.7 °C)  afebrile   1315 PREGNANCY TEST URINE: Negative  negative   1315 Urine Macroscopic, POC  Normal   1333 WBC: 7.49  Normal   1350 LIPASE(!): 8  Not pancreatitis   1351 Comprehensive metabolic panel(!)  Unremarkable   1446 CT abdomen pelvis with contrast  Interpreted by me as moderate stool in colon.  A/w radiologist's read.   1512 Pt reports feeling better.  A/w CT read.   1624 CT still not resulted. Called radiology regarding CT read.   1648 Updated pt on CT result. Pt would like work note to go back on 7/15       Medications   ketorolac (TORADOL) injection 15 mg (15 mg Intravenous Given 7/12/25 1327)   iohexol (OMNIPAQUE) 350 MG/ML injection (MULTI-DOSE) 100 mL (100 mL Intravenous Given 7/12/25 1438)       ED Risk Strat Scores                     No data recorded                            History of Present Illness       Chief Complaint   Patient presents with    Abdominal Pain     Patient reporting generalzied abdominal pain as well as bilateral flank pain. Patient has hx of hernia but pain feels more severe than usual. Denies N/V/D.        Past Medical History[1]   Past Surgical History[2]   Family History[3]   Social History[4]   E-Cigarette/Vaping    E-Cigarette Use Never User       E-Cigarette/Vaping Substances    Nicotine No     THC No     CBD No     Flavoring No     Other No     Unknown No       I have reviewed and agree with the history as documented.     30 y.o. F w/h/o cholecystectomy p/w abd pain x today.  Generalized. Radiates to b/l flanks. Burning. Constant.  Hurts worse with movement.  Took Naproxen without relief.  Also having frequency.  Denies F/C, N/V/D/C, dysuria, hematuria.      History provided by:  Patient   used: No    Abdominal Pain  Associated symptoms: no chills, no constipation, no cough, no diarrhea, no dysuria, no fever, no hematuria, no nausea, no sore throat and no vomiting        Review of Systems   Constitutional:  Negative for chills and fever.   HENT:  Negative for rhinorrhea and sore throat.    Respiratory:  Negative for cough.    Gastrointestinal:  Positive for abdominal pain. Negative for constipation, diarrhea, nausea and vomiting.   Genitourinary:  Positive for frequency. Negative for dysuria and hematuria.           Objective       ED Triage Vitals   Temperature Pulse Blood Pressure Respirations SpO2 Patient Position - Orthostatic VS   07/12/25 1259 07/12/25 1259 07/12/25 1259 07/12/25 1259 07/12/25 1259 07/12/25 1259   98.1 °F (36.7 °C) 84 121/66 18 99 % Lying      Temp Source Heart Rate Source BP Location FiO2 (%) Pain Score    07/12/25 1259 07/12/25 1257 07/12/25 1259 -- 07/12/25 1327    Oral Monitor Right arm  8      Vitals      Date and Time Temp Pulse SpO2 Resp BP Pain Score  FACES Pain Rating User   07/12/25 1327 -- -- -- -- -- 8 -- KG   07/12/25 1259 98.1 °F (36.7 °C) 84 99 % 18 121/66 -- -- KR            Physical Exam  Vitals and nursing note reviewed.   Constitutional:       General: She is not in acute distress.     Appearance: Normal appearance. She is well-developed. She is not ill-appearing, toxic-appearing or diaphoretic.     Eyes:      General: No scleral icterus.    Neck:      Vascular: No JVD.     Cardiovascular:      Rate and Rhythm: Normal rate and regular rhythm.   Pulmonary:      Effort: Pulmonary effort is normal. No accessory muscle usage or respiratory distress.      Breath sounds: Normal breath sounds. No stridor. No wheezing, rhonchi or rales.   Abdominal:      General: There is no distension.      Palpations: Abdomen is soft. Abdomen is not rigid. There is no mass.      Tenderness: There is generalized abdominal tenderness. There is no right CVA tenderness, left CVA tenderness, guarding or rebound.     Musculoskeletal:      Lumbar back: Tenderness present. No bony tenderness.     Skin:     General: Skin is warm and dry.      Coloration: Skin is not jaundiced or pale.      Findings: No rash.     Neurological:      Mental Status: She is alert.      GCS: GCS eye subscore is 4. GCS verbal subscore is 5. GCS motor subscore is 6.         Results Reviewed       Procedure Component Value Units Date/Time    Comprehensive metabolic panel [[id number]]  (Abnormal) Collected: 07/12/25 1327    Lab Status: Final result Specimen: Blood from Arm, Left Updated: 07/12/25 1350     Sodium 136 mmol/L      Potassium 3.7 mmol/L      Chloride 107 mmol/L      CO2 23 mmol/L      ANION GAP 6 mmol/L      BUN 12 mg/dL      Creatinine 0.48 mg/dL      Glucose 91 mg/dL      Calcium 9.0 mg/dL      AST 12 U/L      ALT 12 U/L      Alkaline Phosphatase 78 U/L      Total Protein 7.1 g/dL      Albumin 4.3 g/dL      Total Bilirubin 0.79 mg/dL      eGFR 132 ml/min/1.73sq m     Narrative:      National  Kidney Disease Foundation guidelines for Chronic Kidney Disease (CKD):     Stage 1 with normal or high GFR (GFR > 90 mL/min/1.73 square meters)    Stage 2 Mild CKD (GFR = 60-89 mL/min/1.73 square meters)    Stage 3A Moderate CKD (GFR = 45-59 mL/min/1.73 square meters)    Stage 3B Moderate CKD (GFR = 30-44 mL/min/1.73 square meters)    Stage 4 Severe CKD (GFR = 15-29 mL/min/1.73 square meters)    Stage 5 End Stage CKD (GFR <15 mL/min/1.73 square meters)  Note: GFR calculation is accurate only with a steady state creatinine    Lipase [[id number]]  (Abnormal) Collected: 07/12/25 1327    Lab Status: Final result Specimen: Blood from Arm, Left Updated: 07/12/25 1350     Lipase 8 u/L     CBC and differential [[id number]]  (Abnormal) Collected: 07/12/25 1327    Lab Status: Final result Specimen: Blood from Arm, Left Updated: 07/12/25 1332     WBC 7.49 Thousand/uL      RBC 3.88 Million/uL      Hemoglobin 12.3 g/dL      Hematocrit 34.5 %      MCV 89 fL      MCH 31.7 pg      MCHC 35.7 g/dL      RDW 12.7 %      MPV 10.7 fL      Platelets 256 Thousands/uL      nRBC 0 /100 WBCs      Segmented % 66 %      Immature Grans % 0 %      Lymphocytes % 28 %      Monocytes % 5 %      Eosinophils Relative 1 %      Basophils Relative 0 %      Absolute Neutrophils 4.89 Thousands/µL      Absolute Immature Grans 0.02 Thousand/uL      Absolute Lymphocytes 2.11 Thousands/µL      Absolute Monocytes 0.40 Thousand/µL      Eosinophils Absolute 0.05 Thousand/µL      Basophils Absolute 0.02 Thousands/µL     POCT pregnancy, urine [[id number]]  (Normal) Collected: 07/12/25 1314    Lab Status: Final result Updated: 07/12/25 1314     EXT Preg Test, Ur Negative     Control Valid    Urine Macroscopic, POC [[id number]] Collected: 07/12/25 1311    Lab Status: Final result Specimen: Urine Updated: 07/12/25 1313     Color, UA Yellow     Clarity, UA Clear     pH, UA 5.5     Leukocytes, UA Negative     Nitrite, UA Negative     Protein, UA Negative mg/dl      Glucose,  UA Negative mg/dl      Ketones, UA Negative mg/dl      Urobilinogen, UA 0.2 E.U./dl      Bilirubin, UA Negative     Occult Blood, UA Negative     Specific Gravity, UA >=1.030    Narrative:      CLINITEK RESULT            CT abdomen pelvis with contrast   Final Interpretation by Pablo Monique MD (07/12 1643)      No acute inflammatory process.      Moderate colonic fecal stasis.      Slightly complex right ovarian cyst measuring 4.3 cm.         Workstation performed: [id number]             Procedures    ED Medication and Procedure Management   Prior to Admission Medications   Prescriptions Last Dose Informant Patient Reported? Taking?   cyclobenzaprine (FLEXERIL) 10 mg tablet   No No   Sig: Take 1 tablet (10 mg total) by mouth 2 (two) times a day as needed for muscle spasms   etonogestrel (Nexplanon) subdermal implant  Self Yes No   Sig: Inject 1 each under the skin   ibuprofen (MOTRIN) 400 mg tablet   No No   Sig: Take 1 tablet (400 mg total) by mouth every 6 (six) hours as needed for mild pain   lidocaine (Lidoderm) 5 %   No No   Sig: Apply 1 patch topically over 12 hours daily Remove & Discard patch within 12 hours or as directed by MD   naproxen (Naprosyn) 500 mg tablet   No No   Sig: Take 1 tablet (500 mg total) by mouth 2 (two) times a day with meals for 7 days   nitrofurantoin (MACROBID) 100 mg capsule   No No   Sig: Take 1 capsule (100 mg total) by mouth 2 (two) times a day   pantoprazole (PROTONIX) 40 mg tablet   No No   Sig: Take 1 tablet (40 mg total) by mouth daily   phenazopyridine (PYRIDIUM) 200 mg tablet   No No   Sig: Take 1 tablet (200 mg total) by mouth 3 (three) times a day   sucralfate (CARAFATE) 1 g/10 mL suspension   No No   Sig: Take 10 mL (1 g total) by mouth 4 (four) times a day for 7 days      Facility-Administered Medications: None     Patient's Medications   Discharge Prescriptions    POLYETHYLENE GLYCOL (MIRALAX) 17 G PACKET    Take 17 g by mouth daily       Start Date: 7/12/2025 End  Date: --       Order Dose: 17 g       Quantity: 510 g    Refills: 0     No discharge procedures on file.  ED SEPSIS DOCUMENTATION   Time reflects when diagnosis was documented in both MDM as applicable and the Disposition within this note       Time User Action Codes Description Comment    7/12/2025  4:48 PM Sheri Villa [R10.9] Abdominal pain     7/12/2025  4:48 PM Sheri Villa [K59.00] Constipation     7/12/2025  4:48 PM Sheri Villa [N83.201] Right ovarian cyst                      [1]   Past Medical History:  Diagnosis Date    No known health problems    [2]   Past Surgical History:  Procedure Laterality Date    ND LAPAROSCOPY SURG CHOLECYSTECTOMY N/A 8/21/2024    Procedure: CHOLECYSTECTOMY LAPAROSCOPIC  ROBOTIC ASSISTED;  Surgeon: Jia Ge MD;  Location: [facility];  Service: General    SOFT TISSUE CYST EXCISION      head-2023   [3]   Family History  Problem Relation Name Age of Onset    No Known Problems Mother      No Known Problems Father     [4]   Social History  Tobacco Use    Smoking status: Never     Passive exposure: Never    Smokeless tobacco: Never   Vaping Use    Vaping status: Never Used   Substance Use Topics    Alcohol use: No    Drug use: No        Sheri Villa DO  07/12/25 [id number]

## 2025-07-12 NOTE — Clinical Note
Gi Monroe was seen and treated in our emergency department on 7/12/2025.                Diagnosis:     Gi  may return to work on return date.    She may return on this date: 07/15/2025         If you have any questions or concerns, please don't hesitate to call.      Sheri Villa, DO    ______________________________           _______________          _______________  Hospital Representative                              Date                                Time

## 2025-07-15 ENCOUNTER — NURSE TRIAGE (OUTPATIENT)
Age: 30
End: 2025-07-15

## 2025-07-18 ENCOUNTER — TELEPHONE (OUTPATIENT)
Dept: GASTROENTEROLOGY | Facility: MEDICAL CENTER | Age: 30
End: 2025-07-18

## 2025-07-18 NOTE — TELEPHONE ENCOUNTER
Called patient to confirm location and appt for 07/21      Left voicemail and requested call back to confirm or reschedule

## 2025-07-21 ENCOUNTER — TELEPHONE (OUTPATIENT)
Dept: SURGERY | Facility: CLINIC | Age: 30
End: 2025-07-21

## 2025-07-21 NOTE — TELEPHONE ENCOUNTER
Pt didn't show up for her appt today, but noticed pt has an appt w/GI this afternoon. Left message for pt asking her to let us know if she wants to reschedule since she's going to see GI.

## (undated) DEVICE — CHLORAPREP HI-LITE 26ML ORANGE

## (undated) DEVICE — CADIERE FORCEPS: Brand: ENDOWRIST

## (undated) DEVICE — COLUMN DRAPE

## (undated) DEVICE — TELFA NON-ADHERENT ABSORBENT DRESSING: Brand: TELFA

## (undated) DEVICE — DISPOSABLE OR TOWEL: Brand: CARDINAL HEALTH

## (undated) DEVICE — TROCAR: Brand: KII FIOS FIRST ENTRY

## (undated) DEVICE — EXOFIN PRECISION PEN HIGH VISCOSITY TOPICAL SKIN ADHESIVE: Brand: EXOFIN PRECISION PEN, 1G

## (undated) DEVICE — GLOVE SRG BIOGEL 6.5

## (undated) DEVICE — ALLENTOWN LAP CHOLE APP PACK: Brand: CARDINAL HEALTH

## (undated) DEVICE — ARM DRAPE

## (undated) DEVICE — NEEDLE HYPO 23G X 1-1/2 IN

## (undated) DEVICE — TISSUE RETRIEVAL SYSTEM: Brand: INZII RETRIEVAL SYSTEM

## (undated) DEVICE — MAYO STAND COVER: Brand: CONVERTORS

## (undated) DEVICE — CANNULA SEAL

## (undated) DEVICE — 3M™ STERI-STRIP™ REINFORCED ADHESIVE SKIN CLOSURES, R1547, 1/2 IN X 4 IN (12 MM X 100 MM), 6 STRIPS/ENVELOPE: Brand: 3M™ STERI-STRIP™

## (undated) DEVICE — DRAPE EQUIPMENT RF WAND

## (undated) DEVICE — SUT MONOCRYL 4-0 PS-2 27 IN Y426H

## (undated) DEVICE — INTENDED FOR TISSUE SEPARATION, AND OTHER PROCEDURES THAT REQUIRE A SHARP SURGICAL BLADE TO PUNCTURE OR CUT.: Brand: BARD-PARKER SAFETY BLADES SIZE 15, STERILE

## (undated) DEVICE — PERMANENT CAUTERY HOOK: Brand: ENDOWRIST

## (undated) DEVICE — BLADELESS OBTURATOR: Brand: WECK VISTA

## (undated) DEVICE — SUT ETHILON 3-0 PS-1 18 IN 1663G

## (undated) DEVICE — HEM-O-LOK CLIP CARTRIDGE MED/LARGE DA VINCI SI/XI

## (undated) DEVICE — 3M™ STERI-STRIP™ REINFORCED ADHESIVE SKIN CLOSURES, R1542, 1/4 IN X 1-1/2 IN (6 MM X 38 MM), 6 STRIPS/ENVELOPE: Brand: 3M™ STERI-STRIP™

## (undated) DEVICE — 3M™ STERI-STRIP™ COMPOUND BENZOIN TINCTURE 40 BAGS/CARTON 4 CARTONS/CASE C1544: Brand: 3M™ STERI-STRIP™

## (undated) DEVICE — PROGRASP FORCEPS: Brand: ENDOWRIST

## (undated) DEVICE — MEDIUM-LARGE CLIP APPLIER: Brand: ENDOWRIST

## (undated) DEVICE — 3000CC GUARDIAN II: Brand: GUARDIAN

## (undated) DEVICE — VISUALIZATION SYSTEM: Brand: CLEARIFY

## (undated) DEVICE — GLOVE INDICATOR PI UNDERGLOVE SZ 6.5 BLUE

## (undated) DEVICE — PLUMEPEN PRO 10FT